# Patient Record
Sex: FEMALE | Race: WHITE | Employment: UNEMPLOYED | URBAN - METROPOLITAN AREA
[De-identification: names, ages, dates, MRNs, and addresses within clinical notes are randomized per-mention and may not be internally consistent; named-entity substitution may affect disease eponyms.]

---

## 2018-03-29 ENCOUNTER — HOSPITAL ENCOUNTER (OUTPATIENT)
Age: 33
Discharge: HOME | End: 2018-03-31
Payer: COMMERCIAL

## 2018-03-29 PROCEDURE — 87081 CULTURE SCREEN ONLY: CPT

## 2018-04-01 LAB — GROUP B STREP CULTURE: NORMAL

## 2018-07-03 ENCOUNTER — HOSPITAL ENCOUNTER (EMERGENCY)
Age: 33
Discharge: HOME | End: 2018-07-03
Payer: COMMERCIAL

## 2018-07-03 VITALS
OXYGEN SATURATION: 97 % | WEIGHT: 184 LBS | BODY MASS INDEX: 27.17 KG/M2 | TEMPERATURE: 98.3 F | RESPIRATION RATE: 14 BRPM | SYSTOLIC BLOOD PRESSURE: 117 MMHG | DIASTOLIC BLOOD PRESSURE: 77 MMHG | HEART RATE: 87 BPM

## 2018-07-03 DIAGNOSIS — B37.9 YEAST INFECTION: ICD-10-CM

## 2018-07-03 DIAGNOSIS — Z34.90 PREGNANCY, UNSPECIFIED GESTATIONAL AGE: Primary | ICD-10-CM

## 2018-07-03 LAB
BACTERIA: ABNORMAL /HPF
BILIRUBIN URINE: NEGATIVE
BLOOD, URINE: ABNORMAL
CLARITY: CLEAR
COLOR: YELLOW
EPITHELIAL CELLS, UA: ABNORMAL /HPF
GLUCOSE URINE: NEGATIVE MG/DL
HCG(URINE) PREGNANCY TEST: POSITIVE
KETONES, URINE: NEGATIVE MG/DL
LEUKOCYTE ESTERASE, URINE: ABNORMAL
NITRITE, URINE: NEGATIVE
PH UA: 5.5 (ref 5–9)
PROTEIN UA: NEGATIVE MG/DL
RBC UA: ABNORMAL /HPF (ref 0–2)
SPECIFIC GRAVITY UA: >=1.03 (ref 1–1.03)
UROBILINOGEN, URINE: 1 E.U./DL
WBC UA: ABNORMAL /HPF (ref 0–5)

## 2018-07-03 PROCEDURE — 81001 URINALYSIS AUTO W/SCOPE: CPT

## 2018-07-03 PROCEDURE — 81025 URINE PREGNANCY TEST: CPT

## 2018-07-03 PROCEDURE — 99212 OFFICE O/P EST SF 10 MIN: CPT

## 2018-07-03 ASSESSMENT — PAIN DESCRIPTION - PAIN TYPE: TYPE: ACUTE PAIN

## 2018-07-03 ASSESSMENT — PAIN DESCRIPTION - DESCRIPTORS: DESCRIPTORS: BURNING

## 2018-07-03 ASSESSMENT — PAIN DESCRIPTION - LOCATION: LOCATION: PERINEUM

## 2018-07-03 ASSESSMENT — PAIN SCALES - GENERAL: PAINLEVEL_OUTOF10: 10

## 2018-07-03 NOTE — ED PROVIDER NOTES
[facility]     Department of Emergency Medicine   ED  Provider Note  Admit Date/RoomTime: 7/3/2018  4:58 PM  ED Room:    Chief Complaint:   Urinary Tract Infection (she has burning with urination since yesterday )    History of Present Illness   Source of history provided by:  patient and spouse/SO. History/Exam Limitations: none. Noa Klein is a 34 y.o. old female who has a past medical history of:   Past Medical History:   Diagnosis Date    Knee effusion     presents to the emergency department by private vehicle, for dysuria, External vaginal irritation, which occured 1 day(s) prior to arrival.  Since onset the symptoms have been persistent and mild-moderate in severity. Symptoms are associated with none. She has a history of no complicating symptoms. Patient states that she gave birth to a child 3 months ago and did not know that she was pregnant. She denies vaginal bleeding, abdominal pain, abdominal cramping, vaginal discharge, vaginal lesions, nausea, vomiting, diarrhea, constipation. GYN History: irregular periods. STD History: no history of PID, STD's. Patient's last menstrual period was 2018. Current Pregnancy: Yes. Birth Control: None. Gravid Status:     ROS   Pertinent positives and negatives are stated within HPI, all other systems reviewed and are negative. History reviewed. No pertinent surgical history. Social History:  reports that she has been smoking Cigarettes. She has a 7.50 pack-year smoking history. She uses smokeless tobacco. She reports that she does not drink alcohol or use drugs. Family History: family history is not on file.    Allergies: Penicillins and Vicodin [hydrocodone-acetaminophen]    Physical Exam           ED Triage Vitals [18 1659]   BP Temp Temp Source Pulse Resp SpO2 Height Weight   117/77 98.3 °F (36.8 °C) (S) Oral 87 14 97 % -- 184 lb (83.5 kg)      Oxygen Saturation Interpretation: Normal.    · Constitutional:  Alert,

## 2018-07-03 NOTE — LETTER
[facility]  The [address] [place] [id number]  Phone: [phone]               July 3, 2018    Patient: Noa Klein   YOB: 1985   Date of Visit: 7/3/2018       To Whom It May Concern:    Juan Diego Briseno was seen and treated in our emergency department on 7/3/2018. She may return to work on 7/4/2018.       Sincerely,       Willie Gold LPN         Signature:__________________________________

## 2018-11-26 ENCOUNTER — HOSPITAL ENCOUNTER (EMERGENCY)
Age: 33
Discharge: HOME | End: 2018-11-26
Payer: COMMERCIAL

## 2018-11-26 VITALS
DIASTOLIC BLOOD PRESSURE: 63 MMHG | RESPIRATION RATE: 20 BRPM | SYSTOLIC BLOOD PRESSURE: 116 MMHG | TEMPERATURE: 98.3 F | BODY MASS INDEX: 27.17 KG/M2 | WEIGHT: 184 LBS | OXYGEN SATURATION: 99 % | HEART RATE: 89 BPM

## 2018-11-26 DIAGNOSIS — J06.9 VIRAL UPPER RESPIRATORY TRACT INFECTION: Primary | ICD-10-CM

## 2018-11-26 PROCEDURE — 99212 OFFICE O/P EST SF 10 MIN: CPT

## 2018-11-26 RX ORDER — BENZONATATE 100 MG/1
100 CAPSULE ORAL 3 TIMES DAILY PRN
Qty: 21 CAPSULE | Refills: 0 | Status: SHIPPED | OUTPATIENT
Start: 2018-11-26 | End: 2018-12-03

## 2018-11-26 RX ORDER — ALBUTEROL SULFATE 90 UG/1
2 AEROSOL, METERED RESPIRATORY (INHALATION) EVERY 4 HOURS PRN
Qty: 1 INHALER | Refills: 1 | Status: SHIPPED | OUTPATIENT
Start: 2018-11-26 | End: 2019-05-16

## 2018-11-26 RX ORDER — PREDNISONE 10 MG/1
40 TABLET ORAL DAILY
Qty: 20 TABLET | Refills: 0 | Status: SHIPPED | OUTPATIENT
Start: 2018-11-26 | End: 2018-12-01

## 2018-11-26 RX ORDER — AZITHROMYCIN 250 MG/1
TABLET, FILM COATED ORAL
Qty: 6 TABLET | Refills: 0 | Status: SHIPPED | OUTPATIENT
Start: 2018-11-26 | End: 2018-12-06

## 2019-05-09 ENCOUNTER — HOSPITAL ENCOUNTER (EMERGENCY)
Age: 34
Discharge: HOME | End: 2019-05-09
Payer: COMMERCIAL

## 2019-05-09 VITALS
DIASTOLIC BLOOD PRESSURE: 62 MMHG | RESPIRATION RATE: 16 BRPM | TEMPERATURE: 98.3 F | OXYGEN SATURATION: 99 % | HEART RATE: 95 BPM | SYSTOLIC BLOOD PRESSURE: 105 MMHG | WEIGHT: 216 LBS | BODY MASS INDEX: 31.9 KG/M2

## 2019-05-09 DIAGNOSIS — J06.9 VIRAL UPPER RESPIRATORY TRACT INFECTION: Primary | ICD-10-CM

## 2019-05-09 LAB — STREP GRP A PCR: NEGATIVE

## 2019-05-09 PROCEDURE — 99212 OFFICE O/P EST SF 10 MIN: CPT

## 2019-05-09 PROCEDURE — 87880 STREP A ASSAY W/OPTIC: CPT

## 2019-05-09 RX ORDER — FLUTICASONE PROPIONATE 50 MCG
2 SPRAY, SUSPENSION (ML) NASAL DAILY
Qty: 1 BOTTLE | Refills: 0 | Status: SHIPPED | OUTPATIENT
Start: 2019-05-09 | End: 2019-05-16

## 2019-05-09 NOTE — ED PROVIDER NOTES
Department of Emergency Medicine   [address] [place] [address]  Provider Note  Admit Date/RoomTime: 2019  5:10 PM  Room:   Chief Complaint   Pharyngitis (started last night, PREGNANT, 2 months) and Otalgia (right ear ache)    History of Present Illness   Source of history provided by:  Patient. History/Exam Limitations: None. Kandis Perez is a 30 y.o. female with no significant medical history. She is  at 2 months gestation. Reports the onset yesterday evening of sore throat, congestion, and right ear pain which increased in intensity today. Denies any chest pain or shortness of breath. Denies any fevers or chills. Denies any hemoptysis. Denies any abdominal pain, vomiting, or diarrhea. Does smoke. She denies any abdominal/pelvic pain, vaginal bleeding, leakage of fluid, or urinary symptoms. ROS    Pertinent positives and negatives are stated within HPI, all other systems reviewed and are negative. History reviewed. No pertinent surgical history. Social History:  reports that she has been smoking cigarettes. She has a 7.50 pack-year smoking history. She uses smokeless tobacco. She reports that she does not drink alcohol or use drugs. Family History: family history is not on file. Allergies: Penicillins and Vicodin [hydrocodone-acetaminophen]    Physical Exam            ED Triage Vitals [19 1712]   BP Temp Temp Source Pulse Resp SpO2 Height Weight   105/62 98.3 °F (36.8 °C) Oral 95 16 99 % -- 216 lb (98 kg)      Oxygen Saturation Interpretation: Normal.    Gen.: Vitals noted no distress. Afebrile. Normal phonation, no stridor, no trismus. ENT: Left TM is unremarkable. Right TM shows a slight serous effusion but no obvious acute otitis media. EACs unremarkable. Mastoids nontender. Posterior oropharynx remarkable for erythema but without exudate or swelling. Uvula is in the midline and nonedematous. No peritonsillar abscess. No Laith's Angina. Neck: Supple.  No discussed. All questions answered. Explained symptomatic treatment. Instruction provided in the use of fluids, vaporizer, acetaminophen, and other OTC medication for symptom control. Extra fluids  Analgesics as needed, dose reviewed. Follow up as needed should symptoms fail to improve. Counseling: Homegoing. I discussed the differential, results and discharge plan with the patient and/or family/friend/caregiver if present. I emphasized the importance of follow-up with the physician I referred them to in the timeframe recommended. I explained reasons for the patient to return to the Emergency Department. Additional verbal discharge instructions were also given and discussed with the patient to supplement those generated by the EMR. We also discussed medications that were prescribed (if any) including common side effects and interactions. The patient was advised to abstain from driving, operating heavy machinery or making significant decisions while taking medications such as opiates and muscle relaxers that may impair this. All questions were addressed. They understand return precautions and discharge instructions. The patient and/or family/friend/caregiver expressed understanding. Assessment      1. Viral upper respiratory tract infection      Plan   Discharge to home and advised to contact   Follow-up with your Ob/Gyn    As needed   Patient condition is good    New Medications     New Prescriptions    FLUTICASONE (FLONASE) 50 MCG/ACT NASAL SPRAY    2 sprays by Nasal route daily     Electronically signed by MEENU Gama   DD: 5/9/19  **This report was transcribed using voice recognition software. Every effort was made to ensure accuracy; however, inadvertent computerized transcription errors may be present.   END OF ED PROVIDER NOTE        Jeff Singh [address] [place], [place]  05/09/19 [address] [place] [address]  05/09/19 2414

## 2019-05-16 ENCOUNTER — HOSPITAL ENCOUNTER (EMERGENCY)
Age: 34
Discharge: HOME | End: 2019-05-16
Payer: COMMERCIAL

## 2019-05-16 VITALS
HEIGHT: 68 IN | OXYGEN SATURATION: 98 % | SYSTOLIC BLOOD PRESSURE: 112 MMHG | DIASTOLIC BLOOD PRESSURE: 70 MMHG | TEMPERATURE: 97.3 F | WEIGHT: 216 LBS | HEART RATE: 98 BPM | BODY MASS INDEX: 32.74 KG/M2 | RESPIRATION RATE: 18 BRPM

## 2019-05-16 DIAGNOSIS — J06.9 ACUTE UPPER RESPIRATORY INFECTION: Primary | ICD-10-CM

## 2019-05-16 PROCEDURE — 99282 EMERGENCY DEPT VISIT SF MDM: CPT

## 2019-05-16 RX ORDER — AZITHROMYCIN 250 MG/1
250 TABLET, FILM COATED ORAL SEE ADMIN INSTRUCTIONS
Qty: 6 TABLET | Refills: 0 | Status: SHIPPED | OUTPATIENT
Start: 2019-05-16 | End: 2019-05-21

## 2019-05-16 SDOH — HEALTH STABILITY: MENTAL HEALTH: HOW OFTEN DO YOU HAVE A DRINK CONTAINING ALCOHOL?: NEVER

## 2019-05-16 ASSESSMENT — PAIN SCALES - GENERAL
PAINLEVEL_OUTOF10: 8
PAINLEVEL_OUTOF10: 8

## 2019-05-16 ASSESSMENT — PAIN - FUNCTIONAL ASSESSMENT: PAIN_FUNCTIONAL_ASSESSMENT: 0-10

## 2019-05-16 ASSESSMENT — PAIN DESCRIPTION - PAIN TYPE: TYPE: ACUTE PAIN

## 2019-05-16 ASSESSMENT — PAIN DESCRIPTION - LOCATION: LOCATION: BACK

## 2019-05-16 ASSESSMENT — PAIN DESCRIPTION - ORIENTATION: ORIENTATION: MID

## 2019-05-16 NOTE — ED PROVIDER NOTES
HPI:  5/16/19, Time: 3:36 PM         Tyler Sanchez is a 30 y.o. female presenting to the ED for further evaluation of productive cough for the last week. The patient is a smoker, has history of asthma, reports over last week to 2 weeks she's had a cough that has been sometimes productive. The patient denies any fevers or chills, isn't every day smoker. Patient's proximally 11 weeks pregnant, she does take prenatal vitamins, does follow up with her OB. The patient reports otherwise she is in her normal state of health. Review of Systems:   Pertinent positives and negatives are stated within HPI, all other systems reviewed and are negative.          --------------------------------------------- PAST HISTORY ---------------------------------------------  Past Medical History:  has a past medical history of Knee effusion. Past Surgical History:  has no past surgical history on file. Social History:  reports that she has been smoking cigarettes. She has a 7.50 pack-year smoking history. She uses smokeless tobacco. She reports that she drank alcohol. She reports that she does not use drugs. Family History: family history is not on file. The patients home medications have been reviewed. Allergies: Penicillins and Vicodin [hydrocodone-acetaminophen]    -------------------------------------------------- RESULTS -------------------------------------------------  All laboratory and radiology results have been personally reviewed by myself   LABS:  No results found for this visit on 05/16/19. RADIOLOGY:  Interpreted by Radiologist.  No orders to display       ------------------------- NURSING NOTES AND VITALS REVIEWED ---------------------------   The nursing notes within the ED encounter and vital signs as below have been reviewed.    /70   Pulse 98   Temp 97.3 °F (36.3 °C)   Resp 18   Ht 5' 8\" (1.727 m)   Wt 216 lb (98 kg)   LMP 02/10/2019   SpO2 98%   BMI 32.84 kg/m²   Oxygen Saturation Interpretation: Normal      ---------------------------------------------------PHYSICAL EXAM--------------------------------------      Constitutional/General: Alert and oriented x3, well appearing, non toxic in NAD  Head: Normocephalic and atraumatic  Eyes: PERRL, EOMI  Mouth: Oropharynx clear, handling secretions, no trismus, oropharynx is nonerythematous. No exudates. Neck: Supple, full ROM,   Pulmonary: Lungs clear to auscultation bilaterally, no wheezes, rales, or rhonchi. Not in respiratory distress  Cardiovascular:  Regular rate and rhythm, no murmurs, gallops, or rubs. 2+ distal pulses  Extremities: Moves all extremities x 4. Warm and well perfused  Skin: warm and dry without rash  Neurologic: GCS 15,  Psych: Normal Affect      ------------------------------ ED COURSE/MEDICAL DECISION MAKING----------------------  Medications - No data to display      ED COURSE:       Medical Decision Making:    The patient's lung sounds are productive cough for the last 1-2 weeks for this patient is a smoker, she is 11 weeks pregnant, has a history of asthma. No audible wheezes on exam, the patient is Z-Willy and have her follow-up with a primary care physician. A referral was given her discharge paperwork for help establish with primary care. The patient points to return, the patient is nontoxic appearing, she is stable, vital signs are stable. Counseling: The emergency provider has spoken with the patient and discussed todays results, in addition to providing specific details for the plan of care and counseling regarding the diagnosis and prognosis. Questions are answered at this time and they are agreeable with the plan.      --------------------------------- IMPRESSION AND DISPOSITION ---------------------------------    IMPRESSION  1.  Acute upper respiratory infection Stable       DISPOSITION  Disposition: Discharge to home  Patient condition is good      NOTE: This report was transcribed using voice recognition software.  Every effort was made to ensure accuracy; however, inadvertent computerized transcription errors may be present      Renard Riley DO  05/16/19 1600

## 2019-05-16 NOTE — LETTER
[facility] [address] [place] [address] Emergency Department  [address] [facility] [address]  Phone: [phone]               May 16, 2019    Patient: Benna Carrel   YOB: 1985   Date of Visit: 5/16/2019       To Whom It May Concern:    Abdirahman Lowe was seen and treated in our emergency department on 5/16/2019. She may return to work on 05/17/2019.       Sincerely,       Atul Kidd DO         Signature:__________________________________

## 2019-06-05 ENCOUNTER — APPOINTMENT (OUTPATIENT)
Dept: ULTRASOUND IMAGING | Age: 34
End: 2019-06-05
Payer: COMMERCIAL

## 2019-06-05 ENCOUNTER — HOSPITAL ENCOUNTER (EMERGENCY)
Age: 34
Discharge: HOME | End: 2019-06-05
Payer: COMMERCIAL

## 2019-06-05 VITALS
SYSTOLIC BLOOD PRESSURE: 113 MMHG | DIASTOLIC BLOOD PRESSURE: 62 MMHG | HEART RATE: 90 BPM | RESPIRATION RATE: 16 BRPM | HEIGHT: 69 IN | OXYGEN SATURATION: 98 % | TEMPERATURE: 98.4 F | BODY MASS INDEX: 31.84 KG/M2 | WEIGHT: 215 LBS

## 2019-06-05 DIAGNOSIS — S09.90XA CLOSED HEAD INJURY, INITIAL ENCOUNTER: ICD-10-CM

## 2019-06-05 DIAGNOSIS — O46.8X1 SUBCHORIONIC HEMATOMA IN FIRST TRIMESTER, SINGLE OR UNSPECIFIED FETUS: ICD-10-CM

## 2019-06-05 DIAGNOSIS — R10.13 EPIGASTRIC PAIN: ICD-10-CM

## 2019-06-05 DIAGNOSIS — O41.8X10 SUBCHORIONIC HEMATOMA IN FIRST TRIMESTER, SINGLE OR UNSPECIFIED FETUS: ICD-10-CM

## 2019-06-05 DIAGNOSIS — N30.00 ACUTE CYSTITIS WITHOUT HEMATURIA: Primary | ICD-10-CM

## 2019-06-05 DIAGNOSIS — S16.1XXA STRAIN OF NECK MUSCLE, INITIAL ENCOUNTER: ICD-10-CM

## 2019-06-05 LAB
ANION GAP SERPL CALCULATED.3IONS-SCNC: 12 MMOL/L (ref 7–16)
BACTERIA: ABNORMAL /HPF
BASOPHILS ABSOLUTE: 0.03 E9/L (ref 0–0.2)
BASOPHILS RELATIVE PERCENT: 0.3 % (ref 0–2)
BILIRUBIN URINE: NEGATIVE
BLOOD, URINE: NEGATIVE
BUN BLDV-MCNC: 8 MG/DL (ref 6–20)
CALCIUM SERPL-MCNC: 9.4 MG/DL (ref 8.6–10.2)
CHLORIDE BLD-SCNC: 103 MMOL/L (ref 98–107)
CLARITY: ABNORMAL
CO2: 22 MMOL/L (ref 22–29)
COLOR: YELLOW
CREAT SERPL-MCNC: 0.6 MG/DL (ref 0.5–1)
CRYSTALS, UA: ABNORMAL
EOSINOPHILS ABSOLUTE: 0.2 E9/L (ref 0.05–0.5)
EOSINOPHILS RELATIVE PERCENT: 1.8 % (ref 0–6)
EPITHELIAL CELLS, UA: ABNORMAL /HPF
GFR AFRICAN AMERICAN: >60
GFR NON-AFRICAN AMERICAN: >60 ML/MIN/1.73
GLUCOSE BLD-MCNC: 112 MG/DL (ref 74–99)
GLUCOSE URINE: NEGATIVE MG/DL
HCT VFR BLD CALC: 37 % (ref 34–48)
HEMOGLOBIN: 12.2 G/DL (ref 11.5–15.5)
IMMATURE GRANULOCYTES #: 0.06 E9/L
IMMATURE GRANULOCYTES %: 0.5 % (ref 0–5)
KETONES, URINE: NEGATIVE MG/DL
LEUKOCYTE ESTERASE, URINE: ABNORMAL
LIPASE: 26 U/L (ref 13–60)
LYMPHOCYTES ABSOLUTE: 2.05 E9/L (ref 1.5–4)
LYMPHOCYTES RELATIVE PERCENT: 18.1 % (ref 20–42)
MCH RBC QN AUTO: 28.2 PG (ref 26–35)
MCHC RBC AUTO-ENTMCNC: 33 % (ref 32–34.5)
MCV RBC AUTO: 85.6 FL (ref 80–99.9)
MONOCYTES ABSOLUTE: 0.72 E9/L (ref 0.1–0.95)
MONOCYTES RELATIVE PERCENT: 6.3 % (ref 2–12)
MUCUS: PRESENT
NEUTROPHILS ABSOLUTE: 8.29 E9/L (ref 1.8–7.3)
NEUTROPHILS RELATIVE PERCENT: 73 % (ref 43–80)
NITRITE, URINE: NEGATIVE
PDW BLD-RTO: 12.6 FL (ref 11.5–15)
PH UA: 6 (ref 5–9)
PLATELET # BLD: 194 E9/L (ref 130–450)
PMV BLD AUTO: 11.3 FL (ref 7–12)
POTASSIUM SERPL-SCNC: 3.8 MMOL/L (ref 3.5–5)
PROTEIN UA: 100 MG/DL
RBC # BLD: 4.32 E12/L (ref 3.5–5.5)
RBC UA: ABNORMAL /HPF (ref 0–2)
SODIUM BLD-SCNC: 137 MMOL/L (ref 132–146)
SPECIFIC GRAVITY UA: >=1.03 (ref 1–1.03)
UROBILINOGEN, URINE: 1 E.U./DL
WBC # BLD: 11.4 E9/L (ref 4.5–11.5)
WBC UA: ABNORMAL /HPF (ref 0–5)

## 2019-06-05 PROCEDURE — 81001 URINALYSIS AUTO W/SCOPE: CPT

## 2019-06-05 PROCEDURE — 80048 BASIC METABOLIC PNL TOTAL CA: CPT

## 2019-06-05 PROCEDURE — 76801 OB US < 14 WKS SINGLE FETUS: CPT

## 2019-06-05 PROCEDURE — 36415 COLL VENOUS BLD VENIPUNCTURE: CPT

## 2019-06-05 PROCEDURE — 83690 ASSAY OF LIPASE: CPT

## 2019-06-05 PROCEDURE — 85025 COMPLETE CBC W/AUTO DIFF WBC: CPT

## 2019-06-05 PROCEDURE — 87088 URINE BACTERIA CULTURE: CPT

## 2019-06-05 PROCEDURE — 6370000000 HC RX 637 (ALT 250 FOR IP): Performed by: PHYSICIAN ASSISTANT

## 2019-06-05 PROCEDURE — 99284 EMERGENCY DEPT VISIT MOD MDM: CPT

## 2019-06-05 RX ORDER — ACETAMINOPHEN 500 MG
1000 TABLET ORAL ONCE
Status: COMPLETED | OUTPATIENT
Start: 2019-06-05 | End: 2019-06-05

## 2019-06-05 RX ORDER — NITROFURANTOIN 25; 75 MG/1; MG/1
100 CAPSULE ORAL 2 TIMES DAILY
Qty: 10 CAPSULE | Refills: 0 | Status: SHIPPED | OUTPATIENT
Start: 2019-06-05 | End: 2019-06-10

## 2019-06-05 RX ADMIN — ACETAMINOPHEN 1000 MG: 500 TABLET, FILM COATED ORAL at 17:34

## 2019-06-05 ASSESSMENT — PAIN DESCRIPTION - DESCRIPTORS: DESCRIPTORS: CONSTANT

## 2019-06-05 ASSESSMENT — PAIN SCALES - GENERAL
PAINLEVEL_OUTOF10: 10
PAINLEVEL_OUTOF10: 6

## 2019-06-05 ASSESSMENT — PAIN DESCRIPTION - ORIENTATION: ORIENTATION: UPPER

## 2019-06-05 ASSESSMENT — PAIN DESCRIPTION - FREQUENCY: FREQUENCY: CONTINUOUS

## 2019-06-05 ASSESSMENT — PAIN DESCRIPTION - PROGRESSION: CLINICAL_PROGRESSION: NOT CHANGED

## 2019-06-05 ASSESSMENT — PAIN DESCRIPTION - LOCATION: LOCATION: ABDOMEN;HEAD
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
## 2019-06-07 LAB — URINE CULTURE, ROUTINE: NORMAL

## 2019-07-05 ENCOUNTER — HOSPITAL ENCOUNTER (EMERGENCY)
Age: 34
Discharge: HOME | End: 2019-07-06
Payer: COMMERCIAL

## 2019-07-05 ENCOUNTER — APPOINTMENT (OUTPATIENT)
Dept: ULTRASOUND IMAGING | Age: 34
DRG: 544 | End: 2019-07-05
Payer: COMMERCIAL

## 2019-07-05 ENCOUNTER — HOSPITAL ENCOUNTER (OUTPATIENT)
Age: 34
Setting detail: OBSERVATION
Discharge: HOME | DRG: 544 | End: 2019-07-06
Attending: OBSTETRICS & GYNECOLOGY | Admitting: OBSTETRICS & GYNECOLOGY
Payer: COMMERCIAL

## 2019-07-05 PROBLEM — O26.91 COMPLICATED PREGNANCY, FIRST TRIMESTER: Status: ACTIVE | Noted: 2019-07-05

## 2019-07-05 PROCEDURE — 81001 URINALYSIS AUTO W/SCOPE: CPT

## 2019-07-05 PROCEDURE — 80307 DRUG TEST PRSMV CHEM ANLYZR: CPT

## 2019-07-05 PROCEDURE — 76801 OB US < 14 WKS SINGLE FETUS: CPT

## 2019-07-06 ENCOUNTER — ANESTHESIA (OUTPATIENT)
Dept: OPERATING ROOM | Age: 34
DRG: 544 | End: 2019-07-06
Payer: COMMERCIAL

## 2019-07-06 ENCOUNTER — ANESTHESIA EVENT (OUTPATIENT)
Dept: OPERATING ROOM | Age: 34
DRG: 544 | End: 2019-07-06
Payer: COMMERCIAL

## 2019-07-06 VITALS
RESPIRATION RATE: 1 BRPM | OXYGEN SATURATION: 99 % | SYSTOLIC BLOOD PRESSURE: 90 MMHG | TEMPERATURE: 98.1 F | DIASTOLIC BLOOD PRESSURE: 46 MMHG

## 2019-07-06 VITALS
TEMPERATURE: 97.7 F | OXYGEN SATURATION: 96 % | RESPIRATION RATE: 14 BRPM | HEART RATE: 83 BPM | DIASTOLIC BLOOD PRESSURE: 63 MMHG | SYSTOLIC BLOOD PRESSURE: 122 MMHG

## 2019-07-06 PROBLEM — O03.9 SPONTANEOUS MISCARRIAGE: Status: ACTIVE | Noted: 2019-07-06

## 2019-07-06 PROBLEM — O03.4 INCOMPLETE ABORTION: Status: ACTIVE | Noted: 2019-07-06

## 2019-07-06 LAB
ABO/RH: NORMAL
AMPHETAMINE SCREEN, URINE: NOT DETECTED
ANTIBODY SCREEN: NORMAL
BACTERIA: ABNORMAL /HPF
BARBITURATE SCREEN URINE: NOT DETECTED
BASOPHILS ABSOLUTE: 0.05 E9/L (ref 0–0.2)
BASOPHILS RELATIVE PERCENT: 0.5 % (ref 0–2)
BENZODIAZEPINE SCREEN, URINE: NOT DETECTED
BILIRUBIN URINE: NEGATIVE
BLOOD, URINE: ABNORMAL
CANNABINOID SCREEN URINE: NOT DETECTED
CLARITY: CLEAR
COCAINE METABOLITE SCREEN URINE: NOT DETECTED
COLOR: YELLOW
CRYSTALS, UA: ABNORMAL
EOSINOPHILS ABSOLUTE: 0.29 E9/L (ref 0.05–0.5)
EOSINOPHILS RELATIVE PERCENT: 2.7 % (ref 0–6)
EPITHELIAL CELLS, UA: ABNORMAL /HPF
GLUCOSE URINE: NEGATIVE MG/DL
HCT VFR BLD CALC: 35.1 % (ref 34–48)
HEMOGLOBIN: 11.8 G/DL (ref 11.5–15.5)
IMMATURE GRANULOCYTES #: 0.05 E9/L
IMMATURE GRANULOCYTES %: 0.5 % (ref 0–5)
KETONES, URINE: NEGATIVE MG/DL
LEUKOCYTE ESTERASE, URINE: NEGATIVE
LYMPHOCYTES ABSOLUTE: 2.37 E9/L (ref 1.5–4)
LYMPHOCYTES RELATIVE PERCENT: 21.7 % (ref 20–42)
MCH RBC QN AUTO: 28.9 PG (ref 26–35)
MCHC RBC AUTO-ENTMCNC: 33.6 % (ref 32–34.5)
MCV RBC AUTO: 85.8 FL (ref 80–99.9)
METHADONE SCREEN, URINE: NOT DETECTED
MONOCYTES ABSOLUTE: 0.69 E9/L (ref 0.1–0.95)
MONOCYTES RELATIVE PERCENT: 6.3 % (ref 2–12)
NEUTROPHILS ABSOLUTE: 7.45 E9/L (ref 1.8–7.3)
NEUTROPHILS RELATIVE PERCENT: 68.3 % (ref 43–80)
NITRITE, URINE: NEGATIVE
OPIATE SCREEN URINE: NOT DETECTED
PDW BLD-RTO: 12.7 FL (ref 11.5–15)
PH UA: 5.5 (ref 5–9)
PHENCYCLIDINE SCREEN URINE: NOT DETECTED
PLATELET # BLD: 180 E9/L (ref 130–450)
PMV BLD AUTO: 11.7 FL (ref 7–12)
PROPOXYPHENE SCREEN: NOT DETECTED
PROTEIN UA: NEGATIVE MG/DL
RBC # BLD: 4.09 E12/L (ref 3.5–5.5)
RBC UA: ABNORMAL /HPF (ref 0–2)
SPECIFIC GRAVITY UA: >=1.03 (ref 1–1.03)
TRICHOMONAS: ABNORMAL /HPF
UROBILINOGEN, URINE: 0.2 E.U./DL
WBC # BLD: 10.9 E9/L (ref 4.5–11.5)
WBC UA: ABNORMAL /HPF (ref 0–5)

## 2019-07-06 PROCEDURE — 7100000001 HC PACU RECOVERY - ADDTL 15 MIN: Performed by: OBSTETRICS & GYNECOLOGY

## 2019-07-06 PROCEDURE — 36415 COLL VENOUS BLD VENIPUNCTURE: CPT

## 2019-07-06 PROCEDURE — 3600000012 HC SURGERY LEVEL 2 ADDTL 15MIN: Performed by: OBSTETRICS & GYNECOLOGY

## 2019-07-06 PROCEDURE — 86901 BLOOD TYPING SEROLOGIC RH(D): CPT

## 2019-07-06 PROCEDURE — 86900 BLOOD TYPING SEROLOGIC ABO: CPT

## 2019-07-06 PROCEDURE — 96360 HYDRATION IV INFUSION INIT: CPT

## 2019-07-06 PROCEDURE — 99201 HC NEW PT, OUTPT VISIT LEVEL 1: CPT

## 2019-07-06 PROCEDURE — 2709999900 HC NON-CHARGEABLE SUPPLY: Performed by: OBSTETRICS & GYNECOLOGY

## 2019-07-06 PROCEDURE — 10D17ZZ EXTRACTION OF PRODUCTS OF CONCEPTION, RETAINED, VIA NATURAL OR ARTIFICIAL OPENING: ICD-10-PCS | Performed by: OBSTETRICS & GYNECOLOGY

## 2019-07-06 PROCEDURE — 3600000002 HC SURGERY LEVEL 2 BASE: Performed by: OBSTETRICS & GYNECOLOGY

## 2019-07-06 PROCEDURE — 86850 RBC ANTIBODY SCREEN: CPT

## 2019-07-06 PROCEDURE — G0378 HOSPITAL OBSERVATION PER HR: HCPCS

## 2019-07-06 PROCEDURE — 85025 COMPLETE CBC W/AUTO DIFF WBC: CPT

## 2019-07-06 PROCEDURE — 3700000001 HC ADD 15 MINUTES (ANESTHESIA): Performed by: OBSTETRICS & GYNECOLOGY

## 2019-07-06 PROCEDURE — 3700000000 HC ANESTHESIA ATTENDED CARE: Performed by: OBSTETRICS & GYNECOLOGY

## 2019-07-06 PROCEDURE — 88305 TISSUE EXAM BY PATHOLOGIST: CPT

## 2019-07-06 PROCEDURE — 6360000002 HC RX W HCPCS: Performed by: OBSTETRICS & GYNECOLOGY

## 2019-07-06 PROCEDURE — 7100000000 HC PACU RECOVERY - FIRST 15 MIN: Performed by: OBSTETRICS & GYNECOLOGY

## 2019-07-06 PROCEDURE — 6370000000 HC RX 637 (ALT 250 FOR IP): Performed by: OBSTETRICS & GYNECOLOGY

## 2019-07-06 PROCEDURE — 2580000003 HC RX 258: Performed by: NURSE ANESTHETIST, CERTIFIED REGISTERED

## 2019-07-06 PROCEDURE — 6360000002 HC RX W HCPCS

## 2019-07-06 PROCEDURE — 6360000002 HC RX W HCPCS: Performed by: NURSE ANESTHETIST, CERTIFIED REGISTERED

## 2019-07-06 PROCEDURE — 96361 HYDRATE IV INFUSION ADD-ON: CPT

## 2019-07-06 RX ORDER — ONDANSETRON 2 MG/ML
4 INJECTION INTRAMUSCULAR; INTRAVENOUS EVERY 6 HOURS PRN
Status: DISCONTINUED | OUTPATIENT
Start: 2019-07-06 | End: 2019-07-06 | Stop reason: HOSPADM

## 2019-07-06 RX ORDER — SODIUM CHLORIDE 0.9 % (FLUSH) 0.9 %
10 SYRINGE (ML) INJECTION PRN
Status: DISCONTINUED | OUTPATIENT
Start: 2019-07-06 | End: 2019-07-06 | Stop reason: HOSPADM

## 2019-07-06 RX ORDER — DOCUSATE SODIUM 100 MG/1
100 CAPSULE, LIQUID FILLED ORAL 2 TIMES DAILY
Status: DISCONTINUED | OUTPATIENT
Start: 2019-07-06 | End: 2019-07-06 | Stop reason: HOSPADM

## 2019-07-06 RX ORDER — FENTANYL CITRATE 50 UG/ML
25 INJECTION, SOLUTION INTRAMUSCULAR; INTRAVENOUS EVERY 5 MIN PRN
Status: DISCONTINUED | OUTPATIENT
Start: 2019-07-06 | End: 2019-07-06 | Stop reason: HOSPADM

## 2019-07-06 RX ORDER — MEPERIDINE HYDROCHLORIDE 25 MG/ML
25 INJECTION INTRAMUSCULAR; INTRAVENOUS; SUBCUTANEOUS EVERY 5 MIN PRN
Status: COMPLETED | OUTPATIENT
Start: 2019-07-06 | End: 2019-07-06

## 2019-07-06 RX ORDER — KETOROLAC TROMETHAMINE 30 MG/ML
INJECTION, SOLUTION INTRAMUSCULAR; INTRAVENOUS PRN
Status: DISCONTINUED | OUTPATIENT
Start: 2019-07-06 | End: 2019-07-06 | Stop reason: SDUPTHER

## 2019-07-06 RX ORDER — FENTANYL CITRATE 50 UG/ML
INJECTION, SOLUTION INTRAMUSCULAR; INTRAVENOUS PRN
Status: DISCONTINUED | OUTPATIENT
Start: 2019-07-06 | End: 2019-07-06 | Stop reason: SDUPTHER

## 2019-07-06 RX ORDER — MEPERIDINE HYDROCHLORIDE 25 MG/ML
INJECTION INTRAMUSCULAR; INTRAVENOUS; SUBCUTANEOUS
Status: COMPLETED
Start: 2019-07-06 | End: 2019-07-06

## 2019-07-06 RX ORDER — SODIUM CHLORIDE 0.9 % (FLUSH) 0.9 %
10 SYRINGE (ML) INJECTION EVERY 12 HOURS SCHEDULED
Status: DISCONTINUED | OUTPATIENT
Start: 2019-07-06 | End: 2019-07-06 | Stop reason: HOSPADM

## 2019-07-06 RX ORDER — MIDAZOLAM HYDROCHLORIDE 1 MG/ML
INJECTION INTRAMUSCULAR; INTRAVENOUS PRN
Status: DISCONTINUED | OUTPATIENT
Start: 2019-07-06 | End: 2019-07-06 | Stop reason: SDUPTHER

## 2019-07-06 RX ORDER — SODIUM CHLORIDE, SODIUM LACTATE, POTASSIUM CHLORIDE, CALCIUM CHLORIDE 600; 310; 30; 20 MG/100ML; MG/100ML; MG/100ML; MG/100ML
INJECTION, SOLUTION INTRAVENOUS CONTINUOUS
Status: DISCONTINUED | OUTPATIENT
Start: 2019-07-06 | End: 2019-07-06 | Stop reason: HOSPADM

## 2019-07-06 RX ORDER — ONDANSETRON 2 MG/ML
4 INJECTION INTRAMUSCULAR; INTRAVENOUS
Status: DISCONTINUED | OUTPATIENT
Start: 2019-07-06 | End: 2019-07-06 | Stop reason: HOSPADM

## 2019-07-06 RX ORDER — ONDANSETRON 2 MG/ML
INJECTION INTRAMUSCULAR; INTRAVENOUS PRN
Status: DISCONTINUED | OUTPATIENT
Start: 2019-07-06 | End: 2019-07-06 | Stop reason: SDUPTHER

## 2019-07-06 RX ORDER — LIDOCAINE HYDROCHLORIDE 20 MG/ML
INJECTION, SOLUTION INTRAVENOUS PRN
Status: DISCONTINUED | OUTPATIENT
Start: 2019-07-06 | End: 2019-07-06 | Stop reason: SDUPTHER

## 2019-07-06 RX ORDER — SODIUM CHLORIDE, SODIUM LACTATE, POTASSIUM CHLORIDE, CALCIUM CHLORIDE 600; 310; 30; 20 MG/100ML; MG/100ML; MG/100ML; MG/100ML
INJECTION, SOLUTION INTRAVENOUS CONTINUOUS PRN
Status: DISCONTINUED | OUTPATIENT
Start: 2019-07-06 | End: 2019-07-06 | Stop reason: SDUPTHER

## 2019-07-06 RX ORDER — FENTANYL CITRATE 50 UG/ML
50 INJECTION, SOLUTION INTRAMUSCULAR; INTRAVENOUS EVERY 5 MIN PRN
Status: DISCONTINUED | OUTPATIENT
Start: 2019-07-06 | End: 2019-07-06 | Stop reason: HOSPADM

## 2019-07-06 RX ORDER — PROPOFOL 10 MG/ML
INJECTION, EMULSION INTRAVENOUS PRN
Status: DISCONTINUED | OUTPATIENT
Start: 2019-07-06 | End: 2019-07-06 | Stop reason: SDUPTHER

## 2019-07-06 RX ORDER — DEXAMETHASONE SODIUM PHOSPHATE 10 MG/ML
INJECTION, SOLUTION INTRAMUSCULAR; INTRAVENOUS PRN
Status: DISCONTINUED | OUTPATIENT
Start: 2019-07-06 | End: 2019-07-06 | Stop reason: SDUPTHER

## 2019-07-06 RX ORDER — KETOROLAC TROMETHAMINE 30 MG/ML
30 INJECTION, SOLUTION INTRAMUSCULAR; INTRAVENOUS EVERY 6 HOURS
Status: DISCONTINUED | OUTPATIENT
Start: 2019-07-06 | End: 2019-07-06 | Stop reason: HOSPADM

## 2019-07-06 RX ORDER — IBUPROFEN 600 MG/1
600 TABLET ORAL EVERY 6 HOURS PRN
Status: DISCONTINUED | OUTPATIENT
Start: 2019-07-08 | End: 2019-07-06 | Stop reason: HOSPADM

## 2019-07-06 RX ADMIN — DEXAMETHASONE SODIUM PHOSPHATE 10 MG: 10 INJECTION, SOLUTION INTRAMUSCULAR; INTRAVENOUS at 10:15

## 2019-07-06 RX ADMIN — ONDANSETRON HYDROCHLORIDE 4 MG: 2 INJECTION, SOLUTION INTRAMUSCULAR; INTRAVENOUS at 10:15

## 2019-07-06 RX ADMIN — MEPERIDINE HYDROCHLORIDE 25 MG: 25 INJECTION INTRAMUSCULAR; INTRAVENOUS; SUBCUTANEOUS at 11:06

## 2019-07-06 RX ADMIN — Medication 250 ML/HR: at 10:24

## 2019-07-06 RX ADMIN — MIDAZOLAM 2 MG: 1 INJECTION INTRAMUSCULAR; INTRAVENOUS at 10:02

## 2019-07-06 RX ADMIN — KETOROLAC TROMETHAMINE 30 MG: 30 INJECTION, SOLUTION INTRAMUSCULAR at 12:50

## 2019-07-06 RX ADMIN — PROPOFOL 200 MG: 10 INJECTION, EMULSION INTRAVENOUS at 10:07

## 2019-07-06 RX ADMIN — KETOROLAC TROMETHAMINE 30 MG: 30 INJECTION, SOLUTION INTRAMUSCULAR; INTRAVENOUS at 10:35

## 2019-07-06 RX ADMIN — LIDOCAINE HYDROCHLORIDE 80 MG: 20 INJECTION, SOLUTION INTRAVENOUS at 10:07

## 2019-07-06 RX ADMIN — SODIUM CHLORIDE, POTASSIUM CHLORIDE, SODIUM LACTATE AND CALCIUM CHLORIDE: 600; 310; 30; 20 INJECTION, SOLUTION INTRAVENOUS at 10:20

## 2019-07-06 RX ADMIN — MEPERIDINE HYDROCHLORIDE 25 MG: 25 INJECTION INTRAMUSCULAR; INTRAVENOUS; SUBCUTANEOUS at 10:50

## 2019-07-06 RX ADMIN — FENTANYL CITRATE 50 MCG: 50 INJECTION, SOLUTION INTRAMUSCULAR; INTRAVENOUS at 10:07

## 2019-07-06 RX ADMIN — SODIUM CHLORIDE, POTASSIUM CHLORIDE, SODIUM LACTATE AND CALCIUM CHLORIDE: 600; 310; 30; 20 INJECTION, SOLUTION INTRAVENOUS at 10:02

## 2019-07-06 RX ADMIN — DOCUSATE SODIUM 100 MG: 100 CAPSULE, LIQUID FILLED ORAL at 12:51

## 2019-07-06 ASSESSMENT — PULMONARY FUNCTION TESTS
PIF_VALUE: 16
PIF_VALUE: 0
PIF_VALUE: 16
PIF_VALUE: 3
PIF_VALUE: 23
PIF_VALUE: 19
PIF_VALUE: 0
PIF_VALUE: 16
PIF_VALUE: 15
PIF_VALUE: 16
PIF_VALUE: 14
PIF_VALUE: 1
PIF_VALUE: 17
PIF_VALUE: 16
PIF_VALUE: 32
PIF_VALUE: 16
PIF_VALUE: 16
PIF_VALUE: 17
PIF_VALUE: 13
PIF_VALUE: 16
PIF_VALUE: 1
PIF_VALUE: 0
PIF_VALUE: 16
PIF_VALUE: 0
PIF_VALUE: 26
PIF_VALUE: 16
PIF_VALUE: 16

## 2019-07-06 ASSESSMENT — PAIN SCALES - GENERAL
PAINLEVEL_OUTOF10: 1
PAINLEVEL_OUTOF10: 5
PAINLEVEL_OUTOF10: 0
PAINLEVEL_OUTOF10: 4
PAINLEVEL_OUTOF10: 1

## 2019-07-06 ASSESSMENT — LIFESTYLE VARIABLES: SMOKING_STATUS: 1

## 2019-07-06 ASSESSMENT — PAIN DESCRIPTION - LOCATION
LOCATION: ABDOMEN
LOCATION: ABDOMEN

## 2019-07-06 ASSESSMENT — PAIN DESCRIPTION - PAIN TYPE
TYPE: SURGICAL PAIN
TYPE: SURGICAL PAIN

## 2019-07-06 ASSESSMENT — PAIN DESCRIPTION - DESCRIPTORS
DESCRIPTORS: ACHING;CRAMPING;DISCOMFORT
DESCRIPTORS: ACHING;CRAMPING;DISCOMFORT

## 2019-07-06 ASSESSMENT — PAIN DESCRIPTION - ORIENTATION: ORIENTATION: LEFT

## 2019-07-06 NOTE — ANESTHESIA PRE PROCEDURE
intravenous. Anesthetic plan and risks discussed with patient. Plan discussed with CRNA.                   Candice Felix MD   7/6/2019

## 2019-11-11 ENCOUNTER — APPOINTMENT (OUTPATIENT)
Dept: ULTRASOUND IMAGING | Age: 34
End: 2019-11-11
Payer: COMMERCIAL

## 2019-11-11 ENCOUNTER — HOSPITAL ENCOUNTER (EMERGENCY)
Age: 34
Discharge: HOME | End: 2019-11-11
Attending: EMERGENCY MEDICINE
Payer: COMMERCIAL

## 2019-11-11 VITALS
TEMPERATURE: 98.5 F | HEIGHT: 68 IN | RESPIRATION RATE: 24 BRPM | SYSTOLIC BLOOD PRESSURE: 133 MMHG | BODY MASS INDEX: 30.31 KG/M2 | DIASTOLIC BLOOD PRESSURE: 69 MMHG | WEIGHT: 200 LBS | HEART RATE: 97 BPM | OXYGEN SATURATION: 98 %

## 2019-11-11 DIAGNOSIS — R11.0 NAUSEA: ICD-10-CM

## 2019-11-11 DIAGNOSIS — N39.0 URINARY TRACT INFECTION WITHOUT HEMATURIA, SITE UNSPECIFIED: Primary | ICD-10-CM

## 2019-11-11 DIAGNOSIS — O26.91 COMPLICATION OF PREGNANCY IN FIRST TRIMESTER: ICD-10-CM

## 2019-11-11 LAB
ALBUMIN SERPL-MCNC: 4.1 G/DL (ref 3.5–5.2)
ALP BLD-CCNC: 61 U/L (ref 35–104)
ALT SERPL-CCNC: 22 U/L (ref 0–32)
ANION GAP SERPL CALCULATED.3IONS-SCNC: 12 MMOL/L (ref 7–16)
AST SERPL-CCNC: 18 U/L (ref 0–31)
BACTERIA: ABNORMAL /HPF
BASOPHILS ABSOLUTE: 0.04 E9/L (ref 0–0.2)
BASOPHILS RELATIVE PERCENT: 0.4 % (ref 0–2)
BILIRUB SERPL-MCNC: 0.3 MG/DL (ref 0–1.2)
BILIRUBIN URINE: NEGATIVE
BLOOD, URINE: ABNORMAL
BUN BLDV-MCNC: 6 MG/DL (ref 6–20)
CALCIUM SERPL-MCNC: 9 MG/DL (ref 8.6–10.2)
CHLORIDE BLD-SCNC: 104 MMOL/L (ref 98–107)
CLARITY: ABNORMAL
CO2: 20 MMOL/L (ref 22–29)
COLOR: YELLOW
CREAT SERPL-MCNC: 0.7 MG/DL (ref 0.5–1)
EOSINOPHILS ABSOLUTE: 0.03 E9/L (ref 0.05–0.5)
EOSINOPHILS RELATIVE PERCENT: 0.3 % (ref 0–6)
EPITHELIAL CELLS, UA: ABNORMAL /HPF
GFR AFRICAN AMERICAN: >60
GFR NON-AFRICAN AMERICAN: >60 ML/MIN/1.73
GLUCOSE BLD-MCNC: 137 MG/DL (ref 74–99)
GLUCOSE URINE: NEGATIVE MG/DL
GONADOTROPIN, CHORIONIC (HCG) QUANT: ABNORMAL MIU/ML
HCG, URINE, POC: POSITIVE
HCT VFR BLD CALC: 35.7 % (ref 34–48)
HEMOGLOBIN: 11.8 G/DL (ref 11.5–15.5)
IMMATURE GRANULOCYTES #: 0.03 E9/L
IMMATURE GRANULOCYTES %: 0.3 % (ref 0–5)
KETONES, URINE: NEGATIVE MG/DL
LEUKOCYTE ESTERASE, URINE: ABNORMAL
LYMPHOCYTES ABSOLUTE: 0.86 E9/L (ref 1.5–4)
LYMPHOCYTES RELATIVE PERCENT: 8.8 % (ref 20–42)
Lab: NORMAL
MCH RBC QN AUTO: 27.9 PG (ref 26–35)
MCHC RBC AUTO-ENTMCNC: 33.1 % (ref 32–34.5)
MCV RBC AUTO: 84.4 FL (ref 80–99.9)
MONOCYTES ABSOLUTE: 0.58 E9/L (ref 0.1–0.95)
MONOCYTES RELATIVE PERCENT: 5.9 % (ref 2–12)
NEGATIVE QC PASS/FAIL: NORMAL
NEUTROPHILS ABSOLUTE: 8.22 E9/L (ref 1.8–7.3)
NEUTROPHILS RELATIVE PERCENT: 84.3 % (ref 43–80)
NITRITE, URINE: NEGATIVE
PDW BLD-RTO: 13.8 FL (ref 11.5–15)
PH UA: 7 (ref 5–9)
PLATELET # BLD: 168 E9/L (ref 130–450)
PMV BLD AUTO: 10.6 FL (ref 7–12)
POSITIVE QC PASS/FAIL: NORMAL
POTASSIUM SERPL-SCNC: 3.6 MMOL/L (ref 3.5–5)
PROTEIN UA: 30 MG/DL
RBC # BLD: 4.23 E12/L (ref 3.5–5.5)
RBC UA: ABNORMAL /HPF (ref 0–2)
SODIUM BLD-SCNC: 136 MMOL/L (ref 132–146)
SPECIFIC GRAVITY UA: 1.02 (ref 1–1.03)
TOTAL PROTEIN: 7.4 G/DL (ref 6.4–8.3)
UROBILINOGEN, URINE: 2 E.U./DL
WBC # BLD: 9.8 E9/L (ref 4.5–11.5)
WBC UA: >20 /HPF (ref 0–5)

## 2019-11-11 PROCEDURE — 84702 CHORIONIC GONADOTROPIN TEST: CPT

## 2019-11-11 PROCEDURE — 81001 URINALYSIS AUTO W/SCOPE: CPT

## 2019-11-11 PROCEDURE — 2580000003 HC RX 258: Performed by: EMERGENCY MEDICINE

## 2019-11-11 PROCEDURE — 6360000002 HC RX W HCPCS: Performed by: EMERGENCY MEDICINE

## 2019-11-11 PROCEDURE — 96375 TX/PRO/DX INJ NEW DRUG ADDON: CPT

## 2019-11-11 PROCEDURE — 80053 COMPREHEN METABOLIC PANEL: CPT

## 2019-11-11 PROCEDURE — 99284 EMERGENCY DEPT VISIT MOD MDM: CPT

## 2019-11-11 PROCEDURE — 36415 COLL VENOUS BLD VENIPUNCTURE: CPT

## 2019-11-11 PROCEDURE — 76775 US EXAM ABDO BACK WALL LIM: CPT

## 2019-11-11 PROCEDURE — 76817 TRANSVAGINAL US OBSTETRIC: CPT

## 2019-11-11 PROCEDURE — 96374 THER/PROPH/DIAG INJ IV PUSH: CPT

## 2019-11-11 PROCEDURE — 6370000000 HC RX 637 (ALT 250 FOR IP): Performed by: EMERGENCY MEDICINE

## 2019-11-11 PROCEDURE — 85025 COMPLETE CBC W/AUTO DIFF WBC: CPT

## 2019-11-11 RX ORDER — SODIUM CHLORIDE 0.9 % (FLUSH) 0.9 %
SYRINGE (ML) INJECTION
Status: DISCONTINUED
Start: 2019-11-11 | End: 2019-11-11 | Stop reason: HOSPADM

## 2019-11-11 RX ORDER — 0.9 % SODIUM CHLORIDE 0.9 %
1000 INTRAVENOUS SOLUTION INTRAVENOUS ONCE
Status: COMPLETED | OUTPATIENT
Start: 2019-11-11 | End: 2019-11-11

## 2019-11-11 RX ORDER — DIPHENHYDRAMINE HYDROCHLORIDE 50 MG/ML
25 INJECTION INTRAMUSCULAR; INTRAVENOUS ONCE
Status: COMPLETED | OUTPATIENT
Start: 2019-11-11 | End: 2019-11-11

## 2019-11-11 RX ORDER — CEPHALEXIN 500 MG/1
500 CAPSULE ORAL 4 TIMES DAILY
Qty: 40 CAPSULE | Refills: 0 | Status: SHIPPED | OUTPATIENT
Start: 2019-11-11 | End: 2019-11-21

## 2019-11-11 RX ORDER — DOXYLAMINE SUCCINATE AND PYRIDOXINE HYDROCHLORIDE, DELAYED RELEASE TABLETS 10 MG/10 MG 10; 10 MG/1; MG/1
TABLET, DELAYED RELEASE ORAL
Qty: 15 TABLET | Refills: 0 | Status: SHIPPED | OUTPATIENT
Start: 2019-11-11 | End: 2020-10-09 | Stop reason: ALTCHOICE

## 2019-11-11 RX ORDER — CEPHALEXIN 250 MG/1
500 CAPSULE ORAL ONCE
Status: COMPLETED | OUTPATIENT
Start: 2019-11-11 | End: 2019-11-11

## 2019-11-11 RX ORDER — ONDANSETRON 2 MG/ML
4 INJECTION INTRAMUSCULAR; INTRAVENOUS ONCE
Status: DISCONTINUED | OUTPATIENT
Start: 2019-11-11 | End: 2019-11-11

## 2019-11-11 RX ORDER — ACETAMINOPHEN 325 MG/1
650 TABLET ORAL ONCE
Status: COMPLETED | OUTPATIENT
Start: 2019-11-11 | End: 2019-11-11

## 2019-11-11 RX ORDER — METOCLOPRAMIDE HYDROCHLORIDE 5 MG/ML
10 INJECTION INTRAMUSCULAR; INTRAVENOUS ONCE
Status: COMPLETED | OUTPATIENT
Start: 2019-11-11 | End: 2019-11-11

## 2019-11-11 RX ADMIN — CEPHALEXIN 500 MG: 250 CAPSULE ORAL at 13:04

## 2019-11-11 RX ADMIN — DIPHENHYDRAMINE HYDROCHLORIDE 25 MG: 50 INJECTION INTRAMUSCULAR; INTRAVENOUS at 11:03

## 2019-11-11 RX ADMIN — ACETAMINOPHEN 650 MG: 325 TABLET, FILM COATED ORAL at 13:03

## 2019-11-11 RX ADMIN — METOCLOPRAMIDE 10 MG: 5 INJECTION, SOLUTION INTRAMUSCULAR; INTRAVENOUS at 11:03

## 2019-11-11 RX ADMIN — SODIUM CHLORIDE 1000 ML: 9 INJECTION, SOLUTION INTRAVENOUS at 11:02

## 2019-11-11 ASSESSMENT — PAIN DESCRIPTION - FREQUENCY: FREQUENCY: CONTINUOUS

## 2019-11-11 ASSESSMENT — ENCOUNTER SYMPTOMS
CONSTIPATION: 0
SHORTNESS OF BREATH: 0
DIARRHEA: 0
VOMITING: 0
NAUSEA: 1
ABDOMINAL PAIN: 1

## 2019-11-11 ASSESSMENT — PAIN DESCRIPTION - PAIN TYPE: TYPE: ACUTE PAIN

## 2019-11-11 ASSESSMENT — PAIN SCALES - GENERAL: PAINLEVEL_OUTOF10: 8

## 2019-11-11 ASSESSMENT — PAIN DESCRIPTION - DESCRIPTORS: DESCRIPTORS: CONSTANT

## 2019-11-11 ASSESSMENT — PAIN DESCRIPTION - ORIENTATION: ORIENTATION: RIGHT

## 2019-11-11 ASSESSMENT — PAIN DESCRIPTION - LOCATION: LOCATION: FLANK;ABDOMEN;GROIN

## 2020-03-11 ENCOUNTER — APPOINTMENT (OUTPATIENT)
Dept: ULTRASOUND IMAGING | Age: 35
End: 2020-03-11
Payer: COMMERCIAL

## 2020-03-11 ENCOUNTER — HOSPITAL ENCOUNTER (OUTPATIENT)
Age: 35
Setting detail: OBSERVATION
Discharge: HOME | End: 2020-03-12
Attending: OBSTETRICS & GYNECOLOGY | Admitting: OBSTETRICS & GYNECOLOGY
Payer: COMMERCIAL

## 2020-03-11 LAB
ABO/RH: NORMAL
ALBUMIN SERPL-MCNC: 3.8 G/DL (ref 3.5–5.2)
ALP BLD-CCNC: 76 U/L (ref 35–104)
ALT SERPL-CCNC: 7 U/L (ref 0–32)
AMPHETAMINE SCREEN, URINE: NOT DETECTED
ANION GAP SERPL CALCULATED.3IONS-SCNC: 17 MMOL/L (ref 7–16)
ANTIBODY SCREEN: NORMAL
AST SERPL-CCNC: 15 U/L (ref 0–31)
BACTERIA: ABNORMAL /HPF
BARBITURATE SCREEN URINE: NOT DETECTED
BASOPHILS ABSOLUTE: 0.05 E9/L (ref 0–0.2)
BASOPHILS RELATIVE PERCENT: 0.3 % (ref 0–2)
BENZODIAZEPINE SCREEN, URINE: NOT DETECTED
BILIRUB SERPL-MCNC: 0.2 MG/DL (ref 0–1.2)
BILIRUBIN URINE: NEGATIVE
BLOOD, URINE: NEGATIVE
BUN BLDV-MCNC: 6 MG/DL (ref 6–20)
CALCIUM SERPL-MCNC: 9.1 MG/DL (ref 8.6–10.2)
CANNABINOID SCREEN URINE: NOT DETECTED
CHLORIDE BLD-SCNC: 100 MMOL/L (ref 98–107)
CLARITY: ABNORMAL
CO2: 18 MMOL/L (ref 22–29)
COCAINE METABOLITE SCREEN URINE: NOT DETECTED
COLOR: YELLOW
CREAT SERPL-MCNC: 0.4 MG/DL (ref 0.5–1)
EOSINOPHILS ABSOLUTE: 0.12 E9/L (ref 0.05–0.5)
EOSINOPHILS RELATIVE PERCENT: 0.7 % (ref 0–6)
EPITHELIAL CELLS, UA: ABNORMAL /HPF
FENTANYL SCREEN, URINE: NOT DETECTED
FETAL FIBRONECTIN: NEGATIVE
GFR AFRICAN AMERICAN: >60
GFR NON-AFRICAN AMERICAN: >60 ML/MIN/1.73
GLUCOSE BLD-MCNC: 92 MG/DL (ref 74–99)
GLUCOSE URINE: NEGATIVE MG/DL
HCT VFR BLD CALC: 38.9 % (ref 34–48)
HEMOGLOBIN: 12.5 G/DL (ref 11.5–15.5)
IMMATURE GRANULOCYTES #: 0.14 E9/L
IMMATURE GRANULOCYTES %: 0.8 % (ref 0–5)
KETONES, URINE: 15 MG/DL
LEUKOCYTE ESTERASE, URINE: ABNORMAL
LYMPHOCYTES ABSOLUTE: 1.34 E9/L (ref 1.5–4)
LYMPHOCYTES RELATIVE PERCENT: 8.1 % (ref 20–42)
Lab: NORMAL
MCH RBC QN AUTO: 28.3 PG (ref 26–35)
MCHC RBC AUTO-ENTMCNC: 32.1 % (ref 32–34.5)
MCV RBC AUTO: 88 FL (ref 80–99.9)
METHADONE SCREEN, URINE: NOT DETECTED
MONOCYTES ABSOLUTE: 0.76 E9/L (ref 0.1–0.95)
MONOCYTES RELATIVE PERCENT: 4.6 % (ref 2–12)
NEUTROPHILS ABSOLUTE: 14.19 E9/L (ref 1.8–7.3)
NEUTROPHILS RELATIVE PERCENT: 85.5 % (ref 43–80)
NITRITE, URINE: POSITIVE
OPIATE SCREEN URINE: NOT DETECTED
OXYCODONE URINE: NOT DETECTED
PDW BLD-RTO: 13.5 FL (ref 11.5–15)
PH UA: 6.5 (ref 5–9)
PHENCYCLIDINE SCREEN URINE: NOT DETECTED
PLATELET # BLD: 213 E9/L (ref 130–450)
PMV BLD AUTO: 11.6 FL (ref 7–12)
POTASSIUM SERPL-SCNC: 4.1 MMOL/L (ref 3.5–5)
PROTEIN UA: NEGATIVE MG/DL
RAPID HIV 1&2: NORMAL
RBC # BLD: 4.42 E12/L (ref 3.5–5.5)
RBC UA: ABNORMAL /HPF (ref 0–2)
SODIUM BLD-SCNC: 135 MMOL/L (ref 132–146)
SPECIFIC GRAVITY UA: 1.02 (ref 1–1.03)
TOTAL PROTEIN: 7.3 G/DL (ref 6.4–8.3)
UROBILINOGEN, URINE: 0.2 E.U./DL
WBC # BLD: 16.6 E9/L (ref 4.5–11.5)
WBC UA: ABNORMAL /HPF (ref 0–5)

## 2020-03-11 PROCEDURE — 87591 N.GONORRHOEAE DNA AMP PROB: CPT

## 2020-03-11 PROCEDURE — 82731 ASSAY OF FETAL FIBRONECTIN: CPT

## 2020-03-11 PROCEDURE — 87340 HEPATITIS B SURFACE AG IA: CPT

## 2020-03-11 PROCEDURE — 81001 URINALYSIS AUTO W/SCOPE: CPT

## 2020-03-11 PROCEDURE — 36415 COLL VENOUS BLD VENIPUNCTURE: CPT

## 2020-03-11 PROCEDURE — G0378 HOSPITAL OBSERVATION PER HR: HCPCS

## 2020-03-11 PROCEDURE — G0379 DIRECT REFER HOSPITAL OBSERV: HCPCS

## 2020-03-11 PROCEDURE — 86592 SYPHILIS TEST NON-TREP QUAL: CPT

## 2020-03-11 PROCEDURE — 85025 COMPLETE CBC W/AUTO DIFF WBC: CPT

## 2020-03-11 PROCEDURE — 2580000003 HC RX 258: Performed by: OBSTETRICS & GYNECOLOGY

## 2020-03-11 PROCEDURE — 96361 HYDRATE IV INFUSION ADD-ON: CPT

## 2020-03-11 PROCEDURE — 6360000002 HC RX W HCPCS: Performed by: OBSTETRICS & GYNECOLOGY

## 2020-03-11 PROCEDURE — 80307 DRUG TEST PRSMV CHEM ANLYZR: CPT

## 2020-03-11 PROCEDURE — 76770 US EXAM ABDO BACK WALL COMP: CPT

## 2020-03-11 PROCEDURE — 6370000000 HC RX 637 (ALT 250 FOR IP): Performed by: OBSTETRICS & GYNECOLOGY

## 2020-03-11 PROCEDURE — 96360 HYDRATION IV INFUSION INIT: CPT

## 2020-03-11 PROCEDURE — 80053 COMPREHEN METABOLIC PANEL: CPT

## 2020-03-11 PROCEDURE — 96372 THER/PROPH/DIAG INJ SC/IM: CPT

## 2020-03-11 PROCEDURE — 86900 BLOOD TYPING SEROLOGIC ABO: CPT

## 2020-03-11 PROCEDURE — 96374 THER/PROPH/DIAG INJ IV PUSH: CPT

## 2020-03-11 PROCEDURE — 86850 RBC ANTIBODY SCREEN: CPT

## 2020-03-11 PROCEDURE — 87491 CHLMYD TRACH DNA AMP PROBE: CPT

## 2020-03-11 PROCEDURE — 86803 HEPATITIS C AB TEST: CPT

## 2020-03-11 PROCEDURE — 87081 CULTURE SCREEN ONLY: CPT

## 2020-03-11 PROCEDURE — 86901 BLOOD TYPING SEROLOGIC RH(D): CPT

## 2020-03-11 PROCEDURE — 86701 HIV-1ANTIBODY: CPT

## 2020-03-11 PROCEDURE — 96376 TX/PRO/DX INJ SAME DRUG ADON: CPT

## 2020-03-11 RX ORDER — MEPERIDINE HYDROCHLORIDE 50 MG/ML
50 INJECTION INTRAMUSCULAR; INTRAVENOUS; SUBCUTANEOUS
Status: DISCONTINUED | OUTPATIENT
Start: 2020-03-11 | End: 2020-03-12

## 2020-03-11 RX ORDER — ONDANSETRON 2 MG/ML
4 INJECTION INTRAMUSCULAR; INTRAVENOUS EVERY 6 HOURS PRN
Status: DISCONTINUED | OUTPATIENT
Start: 2020-03-11 | End: 2020-03-12 | Stop reason: HOSPADM

## 2020-03-11 RX ORDER — TERBUTALINE SULFATE 1 MG/ML
0.25 INJECTION, SOLUTION SUBCUTANEOUS PRN
Status: DISCONTINUED | OUTPATIENT
Start: 2020-03-11 | End: 2020-03-12 | Stop reason: HOSPADM

## 2020-03-11 RX ORDER — ACETAMINOPHEN 325 MG/1
650 TABLET ORAL EVERY 4 HOURS PRN
Status: DISCONTINUED | OUTPATIENT
Start: 2020-03-11 | End: 2020-03-12 | Stop reason: HOSPADM

## 2020-03-11 RX ORDER — SODIUM CHLORIDE, SODIUM LACTATE, POTASSIUM CHLORIDE, CALCIUM CHLORIDE 600; 310; 30; 20 MG/100ML; MG/100ML; MG/100ML; MG/100ML
INJECTION, SOLUTION INTRAVENOUS CONTINUOUS
Status: DISCONTINUED | OUTPATIENT
Start: 2020-03-11 | End: 2020-03-12 | Stop reason: HOSPADM

## 2020-03-11 RX ORDER — MEPERIDINE HYDROCHLORIDE 50 MG/ML
50 INJECTION INTRAMUSCULAR; INTRAVENOUS; SUBCUTANEOUS
Status: DISCONTINUED | OUTPATIENT
Start: 2020-03-11 | End: 2020-03-11

## 2020-03-11 RX ORDER — SODIUM CHLORIDE 0.9 % (FLUSH) 0.9 %
SYRINGE (ML) INJECTION
Status: DISPENSED
Start: 2020-03-11 | End: 2020-03-12

## 2020-03-11 RX ADMIN — MEPERIDINE HYDROCHLORIDE 50 MG: 50 INJECTION INTRAMUSCULAR; INTRAVENOUS; SUBCUTANEOUS at 14:44

## 2020-03-11 RX ADMIN — ONDANSETRON 4 MG: 2 INJECTION INTRAMUSCULAR; INTRAVENOUS at 20:51

## 2020-03-11 RX ADMIN — TERBUTALINE SULFATE 0.25 MG: 1 INJECTION, SOLUTION SUBCUTANEOUS at 13:37

## 2020-03-11 RX ADMIN — MEPERIDINE HYDROCHLORIDE 50 MG: 50 INJECTION INTRAMUSCULAR; INTRAVENOUS; SUBCUTANEOUS at 19:11

## 2020-03-11 RX ADMIN — ONDANSETRON 4 MG: 2 INJECTION INTRAMUSCULAR; INTRAVENOUS at 14:37

## 2020-03-11 RX ADMIN — SODIUM CHLORIDE, POTASSIUM CHLORIDE, SODIUM LACTATE AND CALCIUM CHLORIDE: 600; 310; 30; 20 INJECTION, SOLUTION INTRAVENOUS at 13:40

## 2020-03-11 RX ADMIN — TERBUTALINE SULFATE 0.25 MG: 1 INJECTION, SOLUTION SUBCUTANEOUS at 14:07

## 2020-03-11 RX ADMIN — ACETAMINOPHEN 650 MG: 325 TABLET, FILM COATED ORAL at 18:08

## 2020-03-11 ASSESSMENT — PAIN SCALES - GENERAL
PAINLEVEL_OUTOF10: 10
PAINLEVEL_OUTOF10: 9
PAINLEVEL_OUTOF10: 6

## 2020-03-11 NOTE — H&P
Subjective:      Lucien Fitzgerald is an 37 y.o. female at 32 and 6/7 weeks gestation presenting with   Chief Complaint   Patient presents with    Contractions   . She is also complaining of contractions every a few minutes lasting few seconds. Other associated symptoms include low back pain. Fetal Movement: normal.  No prenatal care in the current pregnancy. History of kidney stones  Past Medical History:   Diagnosis Date    Abnormal Pap smear of cervix     Chronic kidney disease     Knee effusion     Mental disorder     Postpartum depression        Review of Systems  Pertinent items are noted in HPI. Objective:      /66   Pulse 106   Temp 98.2 °F (36.8 °C) (Oral)   Resp 20   LMP 09/10/2019   Blood pressure 131/66, pulse 106, temperature 98.2 °F (36.8 °C), temperature source Oral, resp. rate 20, last menstrual period 09/10/2019, unknown if currently breastfeeding. General:   alert, appears stated age and cooperative   Cervix:   closed.    FHT:   145 BPM   Positive right CVA tenderness  Lab Review    CBC:   Lab Results   Component Value Date    WBC 16.6 03/11/2020    RBC 4.42 03/11/2020    HGB 12.5 03/11/2020    HCT 38.9 03/11/2020    MCV 88.0 03/11/2020    MCH 28.3 03/11/2020    MCHC 32.1 03/11/2020    RDW 13.5 03/11/2020     03/11/2020    MPV 11.6 03/11/2020     CMP:    Lab Results   Component Value Date     03/11/2020    K 4.1 03/11/2020     03/11/2020    CO2 18 03/11/2020    BUN 6 03/11/2020    CREATININE 0.4 03/11/2020    GFRAA >60 03/11/2020    LABGLOM >60 03/11/2020    GLUCOSE 92 03/11/2020    GLUCOSE 92 11/23/2010    PROT 7.3 03/11/2020    LABALBU 3.8 03/11/2020    LABALBU 4.5 11/23/2010    CALCIUM 9.1 03/11/2020    BILITOT 0.2 03/11/2020    ALKPHOS 76 03/11/2020    AST 15 03/11/2020    ALT 7 03/11/2020     U/A:    Lab Results   Component Value Date    COLORU Yellow 03/11/2020    PHUR 6.5 03/11/2020    LABCAST FEW 11/14/2016    WBCUA 10-20 03/11/2020    WBCUA 2-5 11/23/2010 RBCUA NONE 03/11/2020    RBCUA 0-1 11/23/2010    MUCUS Present 06/05/2019    TRICHOMONAS RARE 07/05/2019    BACTERIA MANY 03/11/2020    CLARITYU SLCLOUDY 03/11/2020    SPECGRAV 1.025 03/11/2020    LEUKOCYTESUR SMALL 03/11/2020    UROBILINOGEN 0.2 03/11/2020    BILIRUBINUR Negative 03/11/2020    BILIRUBINUR NEGATIVE 11/23/2010    BLOODU Negative 03/11/2020    GLUCOSEU Negative 03/11/2020    GLUCOSEU NEGATIVE 11/23/2010    AMORPHOUS MANY 10/05/2015          Assessment:      Premature Labor    Rule out kidney stones     Plan:      Monitored for contractions - findings: uterine irritability and reactive strip. Orders: Terbutaline 0.25 mg SQ, IV D5LR started and renal ultrasound and pain management.      Clayton Head MD,3/11/2020 5:30 PM

## 2020-03-12 VITALS
RESPIRATION RATE: 16 BRPM | OXYGEN SATURATION: 98 % | SYSTOLIC BLOOD PRESSURE: 117 MMHG | HEART RATE: 81 BPM | DIASTOLIC BLOOD PRESSURE: 59 MMHG | TEMPERATURE: 98.4 F

## 2020-03-12 PROBLEM — Z3A.28 28 WEEKS GESTATION OF PREGNANCY: Status: ACTIVE | Noted: 2020-03-12

## 2020-03-12 PROBLEM — N20.0 RIGHT KIDNEY STONE: Status: ACTIVE | Noted: 2020-03-12

## 2020-03-12 LAB
HEPATITIS B SURFACE ANTIGEN INTERPRETATION: NORMAL
HEPATITIS C ANTIBODY INTERPRETATION: NORMAL
RPR: NORMAL

## 2020-03-12 PROCEDURE — G0378 HOSPITAL OBSERVATION PER HR: HCPCS

## 2020-03-12 PROCEDURE — 6360000002 HC RX W HCPCS

## 2020-03-12 PROCEDURE — 96372 THER/PROPH/DIAG INJ SC/IM: CPT

## 2020-03-12 PROCEDURE — 96367 TX/PROPH/DG ADDL SEQ IV INF: CPT

## 2020-03-12 PROCEDURE — 96365 THER/PROPH/DIAG IV INF INIT: CPT

## 2020-03-12 PROCEDURE — 2580000003 HC RX 258: Performed by: OBSTETRICS & GYNECOLOGY

## 2020-03-12 PROCEDURE — 6370000000 HC RX 637 (ALT 250 FOR IP): Performed by: OBSTETRICS & GYNECOLOGY

## 2020-03-12 PROCEDURE — 6360000002 HC RX W HCPCS: Performed by: OBSTETRICS & GYNECOLOGY

## 2020-03-12 PROCEDURE — 96376 TX/PRO/DX INJ SAME DRUG ADON: CPT

## 2020-03-12 PROCEDURE — 96375 TX/PRO/DX INJ NEW DRUG ADDON: CPT

## 2020-03-12 RX ADMIN — BUTORPHANOL TARTRATE 1 MG: 2 INJECTION, SOLUTION INTRAMUSCULAR; INTRAVENOUS at 13:26

## 2020-03-12 RX ADMIN — ACETAMINOPHEN 650 MG: 325 TABLET, FILM COATED ORAL at 12:54

## 2020-03-12 RX ADMIN — BUTORPHANOL TARTRATE 1 MG: 2 INJECTION, SOLUTION INTRAMUSCULAR; INTRAVENOUS at 04:39

## 2020-03-12 RX ADMIN — BUTORPHANOL TARTRATE 1 MG: 2 INJECTION, SOLUTION INTRAMUSCULAR; INTRAVENOUS at 09:14

## 2020-03-12 RX ADMIN — SODIUM CHLORIDE, POTASSIUM CHLORIDE, SODIUM LACTATE AND CALCIUM CHLORIDE: 600; 310; 30; 20 INJECTION, SOLUTION INTRAVENOUS at 07:49

## 2020-03-12 RX ADMIN — ACETAMINOPHEN 650 MG: 325 TABLET, FILM COATED ORAL at 00:02

## 2020-03-12 RX ADMIN — BUTORPHANOL TARTRATE 1 MG: 2 INJECTION, SOLUTION INTRAMUSCULAR; INTRAVENOUS at 00:39

## 2020-03-12 RX ADMIN — SODIUM CHLORIDE, POTASSIUM CHLORIDE, SODIUM LACTATE AND CALCIUM CHLORIDE: 600; 310; 30; 20 INJECTION, SOLUTION INTRAVENOUS at 13:26

## 2020-03-12 ASSESSMENT — PAIN SCALES - GENERAL
PAINLEVEL_OUTOF10: 6
PAINLEVEL_OUTOF10: 8
PAINLEVEL_OUTOF10: 7
PAINLEVEL_OUTOF10: 4
PAINLEVEL_OUTOF10: 8
PAINLEVEL_OUTOF10: 7
PAINLEVEL_OUTOF10: 3

## 2020-03-12 ASSESSMENT — PAIN DESCRIPTION - DESCRIPTORS
DESCRIPTORS: DULL
DESCRIPTORS: ACHING;SHARP

## 2020-03-12 NOTE — PROGRESS NOTES
Discharge instructions reviewed with patient. Verbalized understanding. No questions voiced when asked. Given urine strainer.

## 2020-03-12 NOTE — PROGRESS NOTES
Assumed care of patient for the 11pm-7am shift. Plan of care for night discussed, patient verbalizes understanding. Call light placed within reach.

## 2020-03-12 NOTE — PROGRESS NOTES
Feeling much better. Renal sonogram confirmed the presence of a kidney stone. IV and p.o. hydration underway. Patient did not pass any kidney stones so far. Pain under much better control. Blood pressure (!) 117/59, pulse 81, temperature 98.4 °F (36.9 °C), temperature source Oral, resp. rate 16, last menstrual period 09/10/2019, SpO2 98 %, unknown if currently breastfeeding. Fetal heart tones are reactive for gestational age  [facility] shows no contractions    Assessment  28 weeks gestation  Kidney stones, right    Plan  Advised patient to continue p.o. hydration as much as possible. Will discharge home. Patient to follow-up in the office next week.

## 2020-03-12 NOTE — PROGRESS NOTES
Report received and care assumed. Plan of care discussed with patient. verbalized understanding, no questions voiced when asked. Plan of care discussed with patient. verbalized understanding, no questions voiced when asked.  Patient states she has been straining her urine

## 2020-03-14 LAB — GROUP B STREP CULTURE: NORMAL

## 2020-03-15 ENCOUNTER — HOSPITAL ENCOUNTER (INPATIENT)
Age: 35
LOS: 4 days | Discharge: HOME | DRG: 566 | End: 2020-03-20
Attending: OBSTETRICS & GYNECOLOGY | Admitting: OBSTETRICS & GYNECOLOGY
Payer: COMMERCIAL

## 2020-03-15 PROBLEM — Z34.93 NORMAL PREGNANCY IN THIRD TRIMESTER: Status: ACTIVE | Noted: 2020-03-15

## 2020-03-15 LAB
BACTERIA: ABNORMAL /HPF
BASOPHILS ABSOLUTE: 0.03 E9/L (ref 0–0.2)
BASOPHILS RELATIVE PERCENT: 0.2 % (ref 0–2)
BILIRUBIN URINE: NEGATIVE
BLOOD, URINE: NEGATIVE
CLARITY: CLEAR
COLOR: YELLOW
EOSINOPHILS ABSOLUTE: 0.06 E9/L (ref 0.05–0.5)
EOSINOPHILS RELATIVE PERCENT: 0.4 % (ref 0–6)
EPITHELIAL CELLS, UA: ABNORMAL /HPF
GLUCOSE URINE: NEGATIVE MG/DL
HCT VFR BLD CALC: 32.3 % (ref 34–48)
HEMOGLOBIN: 10.6 G/DL (ref 11.5–15.5)
IMMATURE GRANULOCYTES #: 0.1 E9/L
IMMATURE GRANULOCYTES %: 0.6 % (ref 0–5)
KETONES, URINE: NEGATIVE MG/DL
LEUKOCYTE ESTERASE, URINE: ABNORMAL
LYMPHOCYTES ABSOLUTE: 0.96 E9/L (ref 1.5–4)
LYMPHOCYTES RELATIVE PERCENT: 6.2 % (ref 20–42)
MCH RBC QN AUTO: 28.6 PG (ref 26–35)
MCHC RBC AUTO-ENTMCNC: 32.8 % (ref 32–34.5)
MCV RBC AUTO: 87.3 FL (ref 80–99.9)
MONOCYTES ABSOLUTE: 1.08 E9/L (ref 0.1–0.95)
MONOCYTES RELATIVE PERCENT: 6.9 % (ref 2–12)
NEUTROPHILS ABSOLUTE: 13.35 E9/L (ref 1.8–7.3)
NEUTROPHILS RELATIVE PERCENT: 85.7 % (ref 43–80)
NITRITE, URINE: NEGATIVE
PDW BLD-RTO: 13.5 FL (ref 11.5–15)
PH UA: 7 (ref 5–9)
PLATELET # BLD: 181 E9/L (ref 130–450)
PMV BLD AUTO: 11.7 FL (ref 7–12)
PROTEIN UA: NEGATIVE MG/DL
RBC # BLD: 3.7 E12/L (ref 3.5–5.5)
RBC UA: ABNORMAL /HPF (ref 0–2)
SPECIFIC GRAVITY UA: 1.02 (ref 1–1.03)
UROBILINOGEN, URINE: 1 E.U./DL
WBC # BLD: 15.6 E9/L (ref 4.5–11.5)
WBC UA: ABNORMAL /HPF (ref 0–5)

## 2020-03-15 PROCEDURE — 6360000002 HC RX W HCPCS: Performed by: OBSTETRICS & GYNECOLOGY

## 2020-03-15 PROCEDURE — 36415 COLL VENOUS BLD VENIPUNCTURE: CPT

## 2020-03-15 PROCEDURE — G0378 HOSPITAL OBSERVATION PER HR: HCPCS

## 2020-03-15 PROCEDURE — 96360 HYDRATION IV INFUSION INIT: CPT

## 2020-03-15 PROCEDURE — 6370000000 HC RX 637 (ALT 250 FOR IP): Performed by: OBSTETRICS & GYNECOLOGY

## 2020-03-15 PROCEDURE — G0379 DIRECT REFER HOSPITAL OBSERV: HCPCS

## 2020-03-15 PROCEDURE — 2500000003 HC RX 250 WO HCPCS: Performed by: OBSTETRICS & GYNECOLOGY

## 2020-03-15 PROCEDURE — 96372 THER/PROPH/DIAG INJ SC/IM: CPT

## 2020-03-15 PROCEDURE — 96361 HYDRATE IV INFUSION ADD-ON: CPT

## 2020-03-15 PROCEDURE — 2580000003 HC RX 258: Performed by: OBSTETRICS & GYNECOLOGY

## 2020-03-15 PROCEDURE — 96365 THER/PROPH/DIAG IV INF INIT: CPT

## 2020-03-15 PROCEDURE — 85025 COMPLETE CBC W/AUTO DIFF WBC: CPT

## 2020-03-15 PROCEDURE — 81001 URINALYSIS AUTO W/SCOPE: CPT

## 2020-03-15 PROCEDURE — 96367 TX/PROPH/DG ADDL SEQ IV INF: CPT

## 2020-03-15 RX ORDER — SODIUM CHLORIDE, SODIUM LACTATE, POTASSIUM CHLORIDE, CALCIUM CHLORIDE 600; 310; 30; 20 MG/100ML; MG/100ML; MG/100ML; MG/100ML
INJECTION, SOLUTION INTRAVENOUS CONTINUOUS
Status: DISCONTINUED | OUTPATIENT
Start: 2020-03-15 | End: 2020-03-20 | Stop reason: HOSPADM

## 2020-03-15 RX ORDER — CLINDAMYCIN PHOSPHATE 900 MG/50ML
900 INJECTION INTRAVENOUS EVERY 8 HOURS
Status: DISCONTINUED | OUTPATIENT
Start: 2020-03-15 | End: 2020-03-20 | Stop reason: HOSPADM

## 2020-03-15 RX ORDER — MORPHINE SULFATE 2 MG/ML
2 INJECTION, SOLUTION INTRAMUSCULAR; INTRAVENOUS EVERY 4 HOURS PRN
Status: DISCONTINUED | OUTPATIENT
Start: 2020-03-15 | End: 2020-03-20 | Stop reason: HOSPADM

## 2020-03-15 RX ORDER — ACETAMINOPHEN 325 MG/1
650 TABLET ORAL EVERY 4 HOURS PRN
Status: DISCONTINUED | OUTPATIENT
Start: 2020-03-15 | End: 2020-03-20 | Stop reason: HOSPADM

## 2020-03-15 RX ORDER — SODIUM CHLORIDE, SODIUM LACTATE, POTASSIUM CHLORIDE, AND CALCIUM CHLORIDE .6; .31; .03; .02 G/100ML; G/100ML; G/100ML; G/100ML
500 INJECTION, SOLUTION INTRAVENOUS ONCE
Status: DISCONTINUED | OUTPATIENT
Start: 2020-03-15 | End: 2020-03-20 | Stop reason: HOSPADM

## 2020-03-15 RX ORDER — MEPERIDINE HYDROCHLORIDE 50 MG/ML
50 INJECTION INTRAMUSCULAR; INTRAVENOUS; SUBCUTANEOUS EVERY 4 HOURS PRN
Status: DISCONTINUED | OUTPATIENT
Start: 2020-03-15 | End: 2020-03-15

## 2020-03-15 RX ORDER — GENTAMICIN SULFATE 80 MG/50ML
80 INJECTION, SOLUTION INTRAVENOUS EVERY 8 HOURS
Status: DISCONTINUED | OUTPATIENT
Start: 2020-03-15 | End: 2020-03-20 | Stop reason: HOSPADM

## 2020-03-15 RX ADMIN — MORPHINE SULFATE 2 MG: 2 INJECTION, SOLUTION INTRAMUSCULAR; INTRAVENOUS at 11:37

## 2020-03-15 RX ADMIN — ACETAMINOPHEN 650 MG: 325 TABLET, FILM COATED ORAL at 13:34

## 2020-03-15 RX ADMIN — GENTAMICIN SULFATE 80 MG: 80 INJECTION, SOLUTION INTRAVENOUS at 20:25

## 2020-03-15 RX ADMIN — CLINDAMYCIN IN 5 PERCENT DEXTROSE 900 MG: 18 INJECTION, SOLUTION INTRAVENOUS at 20:01

## 2020-03-15 RX ADMIN — MORPHINE SULFATE 2 MG: 2 INJECTION, SOLUTION INTRAMUSCULAR; INTRAVENOUS at 15:31

## 2020-03-15 RX ADMIN — MORPHINE SULFATE 2 MG: 2 INJECTION, SOLUTION INTRAMUSCULAR; INTRAVENOUS at 21:26

## 2020-03-15 RX ADMIN — SODIUM CHLORIDE, POTASSIUM CHLORIDE, SODIUM LACTATE AND CALCIUM CHLORIDE: 600; 310; 30; 20 INJECTION, SOLUTION INTRAVENOUS at 16:00

## 2020-03-15 RX ADMIN — ACETAMINOPHEN 650 MG: 325 TABLET, FILM COATED ORAL at 18:30

## 2020-03-15 RX ADMIN — ACETAMINOPHEN 650 MG: 325 TABLET, FILM COATED ORAL at 23:00

## 2020-03-15 ASSESSMENT — PAIN SCALES - GENERAL
PAINLEVEL_OUTOF10: 10
PAINLEVEL_OUTOF10: 7
PAINLEVEL_OUTOF10: 7
PAINLEVEL_OUTOF10: 8
PAINLEVEL_OUTOF10: 7
PAINLEVEL_OUTOF10: 7

## 2020-03-15 NOTE — H&P
Present 06/05/2019    TRICHOMONAS RARE 07/05/2019    BACTERIA MANY 03/15/2020    CLARITYU Clear 03/15/2020    SPECGRAV 1.020 03/15/2020    LEUKOCYTESUR TRACE 03/15/2020    UROBILINOGEN 1.0 03/15/2020    BILIRUBINUR Negative 03/15/2020    BILIRUBINUR NEGATIVE 11/23/2010    BLOODU Negative 03/15/2020    GLUCOSEU Negative 03/15/2020    GLUCOSEU NEGATIVE 11/23/2010    AMORPHOUS MANY 10/05/2015          Assessment:     28 weeks and 3-day pregnancy  Right kidney stone  UTI in third trimester  Fever    Plan:     IV fluids  IV antibiotics  Supportive therapy    Clayton Head MD,3/15/2020 7:53 PM

## 2020-03-16 LAB
C TRACH DNA GENITAL QL NAA+PROBE: NEGATIVE
N. GONORRHOEAE DNA: NEGATIVE
SOURCE: NORMAL

## 2020-03-16 PROCEDURE — 96367 TX/PROPH/DG ADDL SEQ IV INF: CPT

## 2020-03-16 PROCEDURE — 96366 THER/PROPH/DIAG IV INF ADDON: CPT

## 2020-03-16 PROCEDURE — 96375 TX/PRO/DX INJ NEW DRUG ADDON: CPT

## 2020-03-16 PROCEDURE — 6370000000 HC RX 637 (ALT 250 FOR IP): Performed by: OBSTETRICS & GYNECOLOGY

## 2020-03-16 PROCEDURE — 96365 THER/PROPH/DIAG IV INF INIT: CPT

## 2020-03-16 PROCEDURE — 2580000003 HC RX 258: Performed by: OBSTETRICS & GYNECOLOGY

## 2020-03-16 PROCEDURE — G0378 HOSPITAL OBSERVATION PER HR: HCPCS

## 2020-03-16 PROCEDURE — 6360000002 HC RX W HCPCS: Performed by: OBSTETRICS & GYNECOLOGY

## 2020-03-16 PROCEDURE — 2500000003 HC RX 250 WO HCPCS: Performed by: OBSTETRICS & GYNECOLOGY

## 2020-03-16 PROCEDURE — 96372 THER/PROPH/DIAG INJ SC/IM: CPT

## 2020-03-16 PROCEDURE — 96361 HYDRATE IV INFUSION ADD-ON: CPT

## 2020-03-16 RX ORDER — PROMETHAZINE HYDROCHLORIDE 25 MG/ML
25 INJECTION, SOLUTION INTRAMUSCULAR; INTRAVENOUS EVERY 6 HOURS PRN
Status: DISCONTINUED | OUTPATIENT
Start: 2020-03-16 | End: 2020-03-17

## 2020-03-16 RX ORDER — MEPERIDINE HYDROCHLORIDE 50 MG/ML
50 INJECTION INTRAMUSCULAR; INTRAVENOUS; SUBCUTANEOUS EVERY 4 HOURS PRN
Status: DISCONTINUED | OUTPATIENT
Start: 2020-03-16 | End: 2020-03-20 | Stop reason: HOSPADM

## 2020-03-16 RX ORDER — CALCIUM CARBONATE 200(500)MG
500 TABLET,CHEWABLE ORAL 3 TIMES DAILY PRN
Status: DISCONTINUED | OUTPATIENT
Start: 2020-03-16 | End: 2020-03-20 | Stop reason: HOSPADM

## 2020-03-16 RX ADMIN — ACETAMINOPHEN 650 MG: 325 TABLET, FILM COATED ORAL at 03:48

## 2020-03-16 RX ADMIN — MORPHINE SULFATE 2 MG: 2 INJECTION, SOLUTION INTRAMUSCULAR; INTRAVENOUS at 01:50

## 2020-03-16 RX ADMIN — ACETAMINOPHEN 650 MG: 325 TABLET, FILM COATED ORAL at 13:23

## 2020-03-16 RX ADMIN — CLINDAMYCIN IN 5 PERCENT DEXTROSE 900 MG: 18 INJECTION, SOLUTION INTRAVENOUS at 03:48

## 2020-03-16 RX ADMIN — SODIUM CHLORIDE, POTASSIUM CHLORIDE, SODIUM LACTATE AND CALCIUM CHLORIDE: 600; 310; 30; 20 INJECTION, SOLUTION INTRAVENOUS at 23:13

## 2020-03-16 RX ADMIN — CALCIUM CARBONATE (ANTACID) CHEW TAB 500 MG 500 MG: 500 CHEW TAB at 14:00

## 2020-03-16 RX ADMIN — MORPHINE SULFATE 2 MG: 2 INJECTION, SOLUTION INTRAMUSCULAR; INTRAVENOUS at 15:04

## 2020-03-16 RX ADMIN — ACETAMINOPHEN 650 MG: 325 TABLET, FILM COATED ORAL at 08:30

## 2020-03-16 RX ADMIN — GENTAMICIN SULFATE 80 MG: 80 INJECTION, SOLUTION INTRAVENOUS at 05:03

## 2020-03-16 RX ADMIN — CLINDAMYCIN IN 5 PERCENT DEXTROSE 900 MG: 18 INJECTION, SOLUTION INTRAVENOUS at 12:30

## 2020-03-16 RX ADMIN — GENTAMICIN SULFATE 80 MG: 80 INJECTION, SOLUTION INTRAVENOUS at 13:48

## 2020-03-16 RX ADMIN — SODIUM CHLORIDE, POTASSIUM CHLORIDE, SODIUM LACTATE AND CALCIUM CHLORIDE: 600; 310; 30; 20 INJECTION, SOLUTION INTRAVENOUS at 00:05

## 2020-03-16 RX ADMIN — MORPHINE SULFATE 2 MG: 2 INJECTION, SOLUTION INTRAMUSCULAR; INTRAVENOUS at 06:10

## 2020-03-16 RX ADMIN — SODIUM CHLORIDE, POTASSIUM CHLORIDE, SODIUM LACTATE AND CALCIUM CHLORIDE: 600; 310; 30; 20 INJECTION, SOLUTION INTRAVENOUS at 09:29

## 2020-03-16 RX ADMIN — PROMETHAZINE HYDROCHLORIDE 25 MG: 25 INJECTION INTRAMUSCULAR; INTRAVENOUS at 18:45

## 2020-03-16 RX ADMIN — ACETAMINOPHEN 650 MG: 325 TABLET, FILM COATED ORAL at 23:23

## 2020-03-16 RX ADMIN — CLINDAMYCIN IN 5 PERCENT DEXTROSE 900 MG: 18 INJECTION, SOLUTION INTRAVENOUS at 21:14

## 2020-03-16 RX ADMIN — MEPERIDINE HYDROCHLORIDE 50 MG: 50 INJECTION INTRAMUSCULAR; INTRAVENOUS; SUBCUTANEOUS at 18:44

## 2020-03-16 RX ADMIN — ACETAMINOPHEN 650 MG: 325 TABLET, FILM COATED ORAL at 17:16

## 2020-03-16 RX ADMIN — MEPERIDINE HYDROCHLORIDE 50 MG: 50 INJECTION INTRAMUSCULAR; INTRAVENOUS; SUBCUTANEOUS at 23:13

## 2020-03-16 RX ADMIN — GENTAMICIN SULFATE 80 MG: 80 INJECTION, SOLUTION INTRAVENOUS at 23:13

## 2020-03-16 RX ADMIN — MORPHINE SULFATE 2 MG: 2 INJECTION, SOLUTION INTRAMUSCULAR; INTRAVENOUS at 10:47

## 2020-03-16 ASSESSMENT — PAIN SCALES - GENERAL
PAINLEVEL_OUTOF10: 8
PAINLEVEL_OUTOF10: 9
PAINLEVEL_OUTOF10: 7
PAINLEVEL_OUTOF10: 4
PAINLEVEL_OUTOF10: 8
PAINLEVEL_OUTOF10: 8
PAINLEVEL_OUTOF10: 9
PAINLEVEL_OUTOF10: 7
PAINLEVEL_OUTOF10: 7
PAINLEVEL_OUTOF10: 10
PAINLEVEL_OUTOF10: 10

## 2020-03-16 ASSESSMENT — PAIN DESCRIPTION - ONSET: ONSET: ON-GOING

## 2020-03-16 ASSESSMENT — PAIN DESCRIPTION - FREQUENCY: FREQUENCY: CONTINUOUS

## 2020-03-16 ASSESSMENT — PAIN DESCRIPTION - DESCRIPTORS
DESCRIPTORS: CRAMPING;ACHING
DESCRIPTORS: HEADACHE
DESCRIPTORS: ACHING;SHARP;SQUEEZING

## 2020-03-16 ASSESSMENT — PAIN DESCRIPTION - PAIN TYPE: TYPE: ACUTE PAIN

## 2020-03-16 ASSESSMENT — PAIN DESCRIPTION - ORIENTATION: ORIENTATION: MID;RIGHT

## 2020-03-16 ASSESSMENT — PAIN DESCRIPTION - PROGRESSION: CLINICAL_PROGRESSION: GRADUALLY WORSENING

## 2020-03-16 ASSESSMENT — PAIN - FUNCTIONAL ASSESSMENT: PAIN_FUNCTIONAL_ASSESSMENT: ACTIVITIES ARE NOT PREVENTED

## 2020-03-16 ASSESSMENT — PAIN DESCRIPTION - LOCATION: LOCATION: BACK;FLANK

## 2020-03-17 PROBLEM — O26.92 COMPLICATION OF PREGNANCY, SECOND TRIMESTER: Status: ACTIVE | Noted: 2020-03-17

## 2020-03-17 PROCEDURE — 1220000001 HC SEMI PRIVATE L&D R&B

## 2020-03-17 PROCEDURE — 6360000002 HC RX W HCPCS: Performed by: OBSTETRICS & GYNECOLOGY

## 2020-03-17 PROCEDURE — 2500000003 HC RX 250 WO HCPCS: Performed by: OBSTETRICS & GYNECOLOGY

## 2020-03-17 PROCEDURE — 6370000000 HC RX 637 (ALT 250 FOR IP): Performed by: OBSTETRICS & GYNECOLOGY

## 2020-03-17 PROCEDURE — 2580000003 HC RX 258: Performed by: OBSTETRICS & GYNECOLOGY

## 2020-03-17 RX ORDER — PROMETHAZINE HYDROCHLORIDE 25 MG/ML
25 INJECTION, SOLUTION INTRAMUSCULAR; INTRAVENOUS EVERY 6 HOURS PRN
Status: DISCONTINUED | OUTPATIENT
Start: 2020-03-17 | End: 2020-03-20 | Stop reason: HOSPADM

## 2020-03-17 RX ORDER — SODIUM PHOSPHATE,MONO-DIBASIC 19G-7G/118
1 ENEMA (ML) RECTAL ONCE
Status: DISCONTINUED | OUTPATIENT
Start: 2020-03-17 | End: 2020-03-20 | Stop reason: HOSPADM

## 2020-03-17 RX ORDER — SODIUM PHOSPHATE,MONO-DIBASIC 19G-7G/118
1 ENEMA (ML) RECTAL ONCE
Status: COMPLETED | OUTPATIENT
Start: 2020-03-17 | End: 2020-03-17

## 2020-03-17 RX ORDER — SODIUM PHOSPHATE, DIBASIC AND SODIUM PHOSPHATE, MONOBASIC 3.5; 9.5 G/66ML; G/66ML
ENEMA RECTAL ONCE
Status: DISCONTINUED | OUTPATIENT
Start: 2020-03-17 | End: 2020-03-17

## 2020-03-17 RX ADMIN — CLINDAMYCIN IN 5 PERCENT DEXTROSE 900 MG: 18 INJECTION, SOLUTION INTRAVENOUS at 13:04

## 2020-03-17 RX ADMIN — SODIUM CHLORIDE, POTASSIUM CHLORIDE, SODIUM LACTATE AND CALCIUM CHLORIDE: 600; 310; 30; 20 INJECTION, SOLUTION INTRAVENOUS at 15:42

## 2020-03-17 RX ADMIN — PROMETHAZINE HYDROCHLORIDE 25 MG: 25 INJECTION INTRAMUSCULAR; INTRAVENOUS at 09:22

## 2020-03-17 RX ADMIN — MEPERIDINE HYDROCHLORIDE 50 MG: 50 INJECTION INTRAMUSCULAR; INTRAVENOUS; SUBCUTANEOUS at 15:43

## 2020-03-17 RX ADMIN — ACETAMINOPHEN 650 MG: 325 TABLET, FILM COATED ORAL at 13:12

## 2020-03-17 RX ADMIN — PROMETHAZINE HYDROCHLORIDE 25 MG: 25 INJECTION INTRAMUSCULAR; INTRAVENOUS at 21:50

## 2020-03-17 RX ADMIN — SODIUM PHOSPHATE 1 ENEMA: 7; 19 ENEMA RECTAL at 11:17

## 2020-03-17 RX ADMIN — PROMETHAZINE HYDROCHLORIDE 25 MG: 25 INJECTION INTRAMUSCULAR; INTRAVENOUS at 15:43

## 2020-03-17 RX ADMIN — SODIUM CHLORIDE, POTASSIUM CHLORIDE, SODIUM LACTATE AND CALCIUM CHLORIDE: 600; 310; 30; 20 INJECTION, SOLUTION INTRAVENOUS at 05:30

## 2020-03-17 RX ADMIN — CLINDAMYCIN IN 5 PERCENT DEXTROSE 900 MG: 18 INJECTION, SOLUTION INTRAVENOUS at 05:27

## 2020-03-17 RX ADMIN — MEPERIDINE HYDROCHLORIDE 50 MG: 50 INJECTION INTRAMUSCULAR; INTRAVENOUS; SUBCUTANEOUS at 21:50

## 2020-03-17 RX ADMIN — MEPERIDINE HYDROCHLORIDE 50 MG: 50 INJECTION INTRAMUSCULAR; INTRAVENOUS; SUBCUTANEOUS at 09:20

## 2020-03-17 RX ADMIN — ACETAMINOPHEN 650 MG: 325 TABLET, FILM COATED ORAL at 22:51

## 2020-03-17 RX ADMIN — CLINDAMYCIN IN 5 PERCENT DEXTROSE 900 MG: 18 INJECTION, SOLUTION INTRAVENOUS at 21:26

## 2020-03-17 RX ADMIN — CALCIUM CARBONATE (ANTACID) CHEW TAB 500 MG 500 MG: 500 CHEW TAB at 21:49

## 2020-03-17 RX ADMIN — ACETAMINOPHEN 650 MG: 325 TABLET, FILM COATED ORAL at 06:11

## 2020-03-17 RX ADMIN — GENTAMICIN SULFATE 80 MG: 80 INJECTION, SOLUTION INTRAVENOUS at 22:11

## 2020-03-17 RX ADMIN — GENTAMICIN SULFATE 80 MG: 80 INJECTION, SOLUTION INTRAVENOUS at 14:01

## 2020-03-17 RX ADMIN — PROMETHAZINE HYDROCHLORIDE 25 MG: 25 INJECTION INTRAMUSCULAR; INTRAVENOUS at 01:50

## 2020-03-17 RX ADMIN — GENTAMICIN SULFATE 80 MG: 80 INJECTION, SOLUTION INTRAVENOUS at 06:11

## 2020-03-17 RX ADMIN — ACETAMINOPHEN 650 MG: 325 TABLET, FILM COATED ORAL at 18:14

## 2020-03-17 ASSESSMENT — PAIN DESCRIPTION - ONSET
ONSET: ON-GOING
ONSET: GRADUAL

## 2020-03-17 ASSESSMENT — PAIN SCALES - GENERAL
PAINLEVEL_OUTOF10: 4
PAINLEVEL_OUTOF10: 8
PAINLEVEL_OUTOF10: 3
PAINLEVEL_OUTOF10: 4
PAINLEVEL_OUTOF10: 3
PAINLEVEL_OUTOF10: 8
PAINLEVEL_OUTOF10: 5
PAINLEVEL_OUTOF10: 2
PAINLEVEL_OUTOF10: 2
PAINLEVEL_OUTOF10: 7
PAINLEVEL_OUTOF10: 3
PAINLEVEL_OUTOF10: 4

## 2020-03-17 ASSESSMENT — PAIN - FUNCTIONAL ASSESSMENT
PAIN_FUNCTIONAL_ASSESSMENT: ACTIVITIES ARE NOT PREVENTED

## 2020-03-17 ASSESSMENT — PAIN DESCRIPTION - ORIENTATION
ORIENTATION: RIGHT;LOWER
ORIENTATION: LOWER;RIGHT
ORIENTATION: RIGHT;LOWER

## 2020-03-17 ASSESSMENT — PAIN DESCRIPTION - PAIN TYPE
TYPE: ACUTE PAIN

## 2020-03-17 ASSESSMENT — PAIN DESCRIPTION - LOCATION
LOCATION: BACK;FLANK
LOCATION: BACK;FLANK
LOCATION: BACK;FLANK;HEAD
LOCATION: BACK;FLANK
LOCATION: BACK;FLANK
LOCATION: BACK;FLANK;HEAD

## 2020-03-17 ASSESSMENT — PAIN DESCRIPTION - PROGRESSION
CLINICAL_PROGRESSION: GRADUALLY IMPROVING
CLINICAL_PROGRESSION: GRADUALLY WORSENING
CLINICAL_PROGRESSION: NOT CHANGED
CLINICAL_PROGRESSION: GRADUALLY WORSENING

## 2020-03-17 ASSESSMENT — PAIN DESCRIPTION - FREQUENCY
FREQUENCY: INTERMITTENT

## 2020-03-17 NOTE — PLAN OF CARE
Problem: Pain:  Goal: Pain level will decrease  Description: Pain level will decrease  Outcome: Met This Shift     Problem: Pain:  Goal: Control of acute pain  Description: Control of acute pain  3/17/2020 1626 by Kiet Berumen RN  Outcome: Met This Shift     Problem: Pain:  Goal: Control of chronic pain  Description: Control of chronic pain  Outcome: Met This Shift

## 2020-03-18 LAB
ANION GAP SERPL CALCULATED.3IONS-SCNC: 14 MMOL/L (ref 7–16)
BUN BLDV-MCNC: 6 MG/DL (ref 6–20)
CALCIUM SERPL-MCNC: 9 MG/DL (ref 8.6–10.2)
CHLORIDE BLD-SCNC: 101 MMOL/L (ref 98–107)
CO2: 20 MMOL/L (ref 22–29)
CREAT SERPL-MCNC: 0.4 MG/DL (ref 0.5–1)
GFR AFRICAN AMERICAN: >60
GFR NON-AFRICAN AMERICAN: >60 ML/MIN/1.73
GLUCOSE BLD-MCNC: 80 MG/DL (ref 74–99)
HCT VFR BLD CALC: 30.5 % (ref 34–48)
HEMOGLOBIN: 10 G/DL (ref 11.5–15.5)
MCH RBC QN AUTO: 28.1 PG (ref 26–35)
MCHC RBC AUTO-ENTMCNC: 32.8 % (ref 32–34.5)
MCV RBC AUTO: 85.7 FL (ref 80–99.9)
PDW BLD-RTO: 13.7 FL (ref 11.5–15)
PLATELET # BLD: 180 E9/L (ref 130–450)
PMV BLD AUTO: 11.6 FL (ref 7–12)
POTASSIUM SERPL-SCNC: 3.8 MMOL/L (ref 3.5–5)
RBC # BLD: 3.56 E12/L (ref 3.5–5.5)
SODIUM BLD-SCNC: 135 MMOL/L (ref 132–146)
WBC # BLD: 10.8 E9/L (ref 4.5–11.5)

## 2020-03-18 PROCEDURE — 1220000001 HC SEMI PRIVATE L&D R&B

## 2020-03-18 PROCEDURE — 80048 BASIC METABOLIC PNL TOTAL CA: CPT

## 2020-03-18 PROCEDURE — 87088 URINE BACTERIA CULTURE: CPT

## 2020-03-18 PROCEDURE — 6370000000 HC RX 637 (ALT 250 FOR IP): Performed by: OBSTETRICS & GYNECOLOGY

## 2020-03-18 PROCEDURE — 36415 COLL VENOUS BLD VENIPUNCTURE: CPT

## 2020-03-18 PROCEDURE — 85027 COMPLETE CBC AUTOMATED: CPT

## 2020-03-18 PROCEDURE — 6360000002 HC RX W HCPCS: Performed by: OBSTETRICS & GYNECOLOGY

## 2020-03-18 PROCEDURE — 2500000003 HC RX 250 WO HCPCS: Performed by: OBSTETRICS & GYNECOLOGY

## 2020-03-18 RX ADMIN — CLINDAMYCIN IN 5 PERCENT DEXTROSE 900 MG: 18 INJECTION, SOLUTION INTRAVENOUS at 04:55

## 2020-03-18 RX ADMIN — ACETAMINOPHEN 650 MG: 325 TABLET, FILM COATED ORAL at 17:11

## 2020-03-18 RX ADMIN — CLINDAMYCIN IN 5 PERCENT DEXTROSE 900 MG: 18 INJECTION, SOLUTION INTRAVENOUS at 21:38

## 2020-03-18 RX ADMIN — PROMETHAZINE HYDROCHLORIDE 25 MG: 25 INJECTION INTRAMUSCULAR; INTRAVENOUS at 02:44

## 2020-03-18 RX ADMIN — MEPERIDINE HYDROCHLORIDE 50 MG: 50 INJECTION INTRAMUSCULAR; INTRAVENOUS; SUBCUTANEOUS at 02:46

## 2020-03-18 RX ADMIN — MEPERIDINE HYDROCHLORIDE 50 MG: 50 INJECTION INTRAMUSCULAR; INTRAVENOUS; SUBCUTANEOUS at 08:22

## 2020-03-18 RX ADMIN — MEPERIDINE HYDROCHLORIDE 50 MG: 50 INJECTION INTRAMUSCULAR; INTRAVENOUS; SUBCUTANEOUS at 18:13

## 2020-03-18 RX ADMIN — ACETAMINOPHEN 650 MG: 325 TABLET, FILM COATED ORAL at 23:09

## 2020-03-18 RX ADMIN — CLINDAMYCIN IN 5 PERCENT DEXTROSE 900 MG: 18 INJECTION, SOLUTION INTRAVENOUS at 13:48

## 2020-03-18 RX ADMIN — PROMETHAZINE HYDROCHLORIDE 25 MG: 25 INJECTION INTRAMUSCULAR; INTRAVENOUS at 18:18

## 2020-03-18 RX ADMIN — PROMETHAZINE HYDROCHLORIDE 25 MG: 25 INJECTION INTRAMUSCULAR; INTRAVENOUS at 08:48

## 2020-03-18 RX ADMIN — GENTAMICIN SULFATE 80 MG: 80 INJECTION, SOLUTION INTRAVENOUS at 15:50

## 2020-03-18 RX ADMIN — MEPERIDINE HYDROCHLORIDE 50 MG: 50 INJECTION INTRAMUSCULAR; INTRAVENOUS; SUBCUTANEOUS at 23:08

## 2020-03-18 RX ADMIN — GENTAMICIN SULFATE 80 MG: 80 INJECTION, SOLUTION INTRAVENOUS at 06:51

## 2020-03-18 RX ADMIN — MEPERIDINE HYDROCHLORIDE 50 MG: 50 INJECTION INTRAMUSCULAR; INTRAVENOUS; SUBCUTANEOUS at 13:49

## 2020-03-18 RX ADMIN — ACETAMINOPHEN 650 MG: 325 TABLET, FILM COATED ORAL at 12:24

## 2020-03-18 ASSESSMENT — PAIN DESCRIPTION - DESCRIPTORS
DESCRIPTORS: ACHING;SORE
DESCRIPTORS: ACHING;CRAMPING

## 2020-03-18 ASSESSMENT — PAIN DESCRIPTION - FREQUENCY: FREQUENCY: CONTINUOUS

## 2020-03-18 ASSESSMENT — PAIN SCALES - GENERAL
PAINLEVEL_OUTOF10: 6
PAINLEVEL_OUTOF10: 7
PAINLEVEL_OUTOF10: 4
PAINLEVEL_OUTOF10: 7
PAINLEVEL_OUTOF10: 8
PAINLEVEL_OUTOF10: 8
PAINLEVEL_OUTOF10: 7

## 2020-03-18 ASSESSMENT — PAIN DESCRIPTION - PROGRESSION: CLINICAL_PROGRESSION: GRADUALLY IMPROVING

## 2020-03-18 ASSESSMENT — PAIN DESCRIPTION - ORIENTATION: ORIENTATION: LOWER;MID

## 2020-03-18 ASSESSMENT — PAIN DESCRIPTION - LOCATION: LOCATION: ABDOMEN

## 2020-03-18 ASSESSMENT — PAIN - FUNCTIONAL ASSESSMENT: PAIN_FUNCTIONAL_ASSESSMENT: ACTIVITIES ARE NOT PREVENTED

## 2020-03-18 ASSESSMENT — PAIN DESCRIPTION - PAIN TYPE: TYPE: ACUTE PAIN

## 2020-03-18 NOTE — CONSULTS
3/18/2020 8:19 AM  Service: Urology  Group: [facility] ([facility])    Brandon Orr  [id number]     Chief Complaint:    Right Kidney Stone     History of Present Illness: The patient is a 37 y.o. female patient who is 28 weeks pregnant and was admitted to labor and delivery department on 3-15-20 with complaints of right flank pain. We were asked to see on 3-17-20 for regarding right kidney stone. She had been seen on 3-11-20 on labor and delivery as well for for complaints of contractions and lower back pain. UA was + for UTI at that time. She denies that she was on any antibiotics. Renal Ultrasound was performed at that time that showed nonobstructing right renal stone and minimal left hydronephrosis. She has never seen a urologist in the past, she states she has been told two years ago she had kidney stones but never required intervention for them. She currently complaints of right flank pain, denies gross hematuria, or fever or chills currently. Past Medical History:   Diagnosis Date    28 weeks gestation of pregnancy 3/12/2020    Abnormal Pap smear of cervix     Chronic kidney disease     Knee effusion     Mental disorder     Postpartum depression          Past Surgical History:   Procedure Laterality Date    DILATION AND CURETTAGE OF UTERUS N/A 7/6/2019    DILATATION AND CURETTAGE SUCTION performed by Daisha Cartagena MD at [address]       Medications Prior to Admission:    Medications Prior to Admission: doxylamine-pyridoxine 10-10 MG TBEC, Take 2 tablets po qhs  Prenatal MV-Min-Fe Fum-FA-DHA (PRENATAL 1 PO), Take by mouth    Allergies:    Penicillins and Vicodin [hydrocodone-acetaminophen]    Social History:    reports that she has been smoking cigarettes. She has a 7.50 pack-year smoking history. She uses smokeless tobacco. She reports previous alcohol use. She reports that she does not use drugs.     Family History:   Non-contributory to this Urological problem  family history is not on file. Review of Systems:  Constitutional: No fever or chills   Respiratory: negative for cough and hemoptysis  Cardiovascular: negative for chest pain and dyspnea  Gastrointestinal: negative for abdominal pain, diarrhea, nausea and vomiting   Derm: negative for rash and skin lesion(s)  Neurological: negative for seizures and tremors  Musculoskeletal: Negative    Psychiatric: Negative   : As above in the HPI, otherwise negative  All other reviews are negative    Physical Exam:     Vitals:  BP (!) 111/52   Pulse 93   Temp 97.7 °F (36.5 °C) (Oral)   Resp 17   LMP 09/10/2019   SpO2 99%     General:  Awake, alert, oriented X 3. No apparent distress. HEENT:  Normocephalic, atraumatic. Lungs:  Respirations symmetric and non-labored. Abdomen:  soft, nontender, no masses  Extremities:  No clubbing, cyanosis, or edema  Skin:  Warm and dry, no open lesions or rashes  Neuro: There are no motor or sensory deficits in the 4 quadrant extremities   Rectal: deferred  Genitourinary:  No stoner     Labs:     Recent Labs     03/15/20  1130   WBC 15.6*   RBC 3.70   HGB 10.6*   HCT 32.3*   MCV 87.3   MCH 28.6   MCHC 32.8   RDW 13.5      MPV 11.7         No results for input(s): CREATININE in the last 72 hours. Imaging:   Narrative   LOCATION: 200       EXAM: US RETROPERITONEAL COMPLETE       COMPARISON: None       HISTORY:  r/o stone    r/o stone       Technique: Real-time, gray scale, color flow of the kidneys and   bladder was obtained.        Findings:   Right kidney: The right kidney measures 13 point cm. Cortical   thickness and echogenicity is within normal limits bilaterally. 5 mm   nonobstructing stone lower pole right kidney.       Left Kidney: The left kidney measures 12.5 cm. Cortical thickness and   echogenicity is within normal limits bilaterally. Minimal left   hydronephrosis. .       A fetus is identified lower pelvis.       IMPRESSION   Minimal left hydronephrosis.  Nonobstructive right nephrolithiasis         Assessment/plan:  Nonobstructing right renal caculi   Mild left Hydronephrosis   UTI     Renal ultrasound reviewed, right renal calculi was nonobstructing at that time of exam. Will order BMP to rule out any azotemia   Urinalysis reviewed, positive UTI, She has not had urine culture done to date and has already been on antibiotics. Will send culture out  BMP ordered to rule out any azotemia  CBC ordered to rule out any worsening leukocytosis   Will keep NPO pending above results for now  Suspect this is right pyelonephritis  Hold off on any stenting at this time unless worsening Azotemia or becomes hemodynamically unstable. Recommend IV Rocephin or Cefepime, will leave antibiotic management to OBGYN recommendations   We will cont to follow her  Please call with any further questions or concerns               Electronically signed by TREVOR Putnam CNP on 3/18/2020 at 8:19 AM   I interviewed and examined the patient. She apparently had a stone several years ago I do not think she required instrumentation. The patient was sleeping comfortably when I entered the room. The assessment at this time is that this is not an obstructing calculus. We chose her Rocephin as this was very compatible with her pregnancy  We will continue to watch her but there are no plans for stent or intervention at this time.   At a later time metabolic evaluation can be done

## 2020-03-18 NOTE — PLAN OF CARE
Problem: Pain:  Goal: Pain level will decrease  Description: Pain level will decrease  3/17/2020 1626 by Nelli Singh RN  Outcome: Met This Shift  Goal: Control of acute pain  Description: Control of acute pain  3/18/2020 0310 by Miguelangel Arambula RN  Outcome: Met This Shift  3/17/2020 1626 by Nelli Singh RN  Outcome: Met This Shift  Goal: Control of chronic pain  Description: Control of chronic pain  3/17/2020 1626 by Nelli Singh RN  Outcome: Met This Shift

## 2020-03-19 PROCEDURE — 2500000003 HC RX 250 WO HCPCS: Performed by: OBSTETRICS & GYNECOLOGY

## 2020-03-19 PROCEDURE — 2580000003 HC RX 258: Performed by: OBSTETRICS & GYNECOLOGY

## 2020-03-19 PROCEDURE — 6360000002 HC RX W HCPCS: Performed by: OBSTETRICS & GYNECOLOGY

## 2020-03-19 PROCEDURE — 6370000000 HC RX 637 (ALT 250 FOR IP): Performed by: OBSTETRICS & GYNECOLOGY

## 2020-03-19 PROCEDURE — 1220000001 HC SEMI PRIVATE L&D R&B

## 2020-03-19 RX ADMIN — SODIUM CHLORIDE, POTASSIUM CHLORIDE, SODIUM LACTATE AND CALCIUM CHLORIDE: 600; 310; 30; 20 INJECTION, SOLUTION INTRAVENOUS at 23:40

## 2020-03-19 RX ADMIN — ACETAMINOPHEN 650 MG: 325 TABLET, FILM COATED ORAL at 08:50

## 2020-03-19 RX ADMIN — GENTAMICIN SULFATE 80 MG: 80 INJECTION, SOLUTION INTRAVENOUS at 00:00

## 2020-03-19 RX ADMIN — GENTAMICIN SULFATE 80 MG: 80 INJECTION, SOLUTION INTRAVENOUS at 15:51

## 2020-03-19 RX ADMIN — PROMETHAZINE HYDROCHLORIDE 25 MG: 25 INJECTION INTRAMUSCULAR; INTRAVENOUS at 00:27

## 2020-03-19 RX ADMIN — SODIUM CHLORIDE, POTASSIUM CHLORIDE, SODIUM LACTATE AND CALCIUM CHLORIDE: 600; 310; 30; 20 INJECTION, SOLUTION INTRAVENOUS at 11:20

## 2020-03-19 RX ADMIN — PROMETHAZINE HYDROCHLORIDE 25 MG: 25 INJECTION INTRAMUSCULAR; INTRAVENOUS at 06:25

## 2020-03-19 RX ADMIN — MEPERIDINE HYDROCHLORIDE 50 MG: 50 INJECTION INTRAMUSCULAR; INTRAVENOUS; SUBCUTANEOUS at 18:53

## 2020-03-19 RX ADMIN — CLINDAMYCIN IN 5 PERCENT DEXTROSE 900 MG: 18 INJECTION, SOLUTION INTRAVENOUS at 20:32

## 2020-03-19 RX ADMIN — SODIUM CHLORIDE, POTASSIUM CHLORIDE, SODIUM LACTATE AND CALCIUM CHLORIDE: 600; 310; 30; 20 INJECTION, SOLUTION INTRAVENOUS at 00:28

## 2020-03-19 RX ADMIN — MEPERIDINE HYDROCHLORIDE 50 MG: 50 INJECTION INTRAMUSCULAR; INTRAVENOUS; SUBCUTANEOUS at 06:25

## 2020-03-19 RX ADMIN — CLINDAMYCIN IN 5 PERCENT DEXTROSE 900 MG: 18 INJECTION, SOLUTION INTRAVENOUS at 05:45

## 2020-03-19 RX ADMIN — ACETAMINOPHEN 650 MG: 325 TABLET, FILM COATED ORAL at 15:40

## 2020-03-19 RX ADMIN — ACETAMINOPHEN 650 MG: 325 TABLET, FILM COATED ORAL at 23:48

## 2020-03-19 RX ADMIN — CLINDAMYCIN IN 5 PERCENT DEXTROSE 900 MG: 18 INJECTION, SOLUTION INTRAVENOUS at 12:49

## 2020-03-19 RX ADMIN — PROMETHAZINE HYDROCHLORIDE 25 MG: 25 INJECTION INTRAMUSCULAR; INTRAVENOUS at 12:44

## 2020-03-19 RX ADMIN — GENTAMICIN SULFATE 80 MG: 80 INJECTION, SOLUTION INTRAVENOUS at 07:55

## 2020-03-19 RX ADMIN — MEPERIDINE HYDROCHLORIDE 50 MG: 50 INJECTION INTRAMUSCULAR; INTRAVENOUS; SUBCUTANEOUS at 12:49

## 2020-03-19 RX ADMIN — PROMETHAZINE HYDROCHLORIDE 25 MG: 25 INJECTION INTRAMUSCULAR; INTRAVENOUS at 18:54

## 2020-03-19 RX ADMIN — GENTAMICIN SULFATE 80 MG: 80 INJECTION, SOLUTION INTRAVENOUS at 23:40

## 2020-03-19 ASSESSMENT — PAIN - FUNCTIONAL ASSESSMENT
PAIN_FUNCTIONAL_ASSESSMENT: 0-10
PAIN_FUNCTIONAL_ASSESSMENT: 0-10
PAIN_FUNCTIONAL_ASSESSMENT: ACTIVITIES ARE NOT PREVENTED

## 2020-03-19 ASSESSMENT — PAIN SCALES - GENERAL
PAINLEVEL_OUTOF10: 6
PAINLEVEL_OUTOF10: 9
PAINLEVEL_OUTOF10: 8
PAINLEVEL_OUTOF10: 8
PAINLEVEL_OUTOF10: 7
PAINLEVEL_OUTOF10: 6

## 2020-03-19 ASSESSMENT — PAIN DESCRIPTION - DESCRIPTORS
DESCRIPTORS: SHARP
DESCRIPTORS: SHARP

## 2020-03-20 VITALS
BODY MASS INDEX: 33.75 KG/M2 | OXYGEN SATURATION: 99 % | HEART RATE: 93 BPM | HEIGHT: 66 IN | DIASTOLIC BLOOD PRESSURE: 62 MMHG | WEIGHT: 210 LBS | SYSTOLIC BLOOD PRESSURE: 122 MMHG | TEMPERATURE: 97.9 F | RESPIRATION RATE: 16 BRPM

## 2020-03-20 PROBLEM — O23.42 URINARY TRACT INFECTION IN MOTHER DURING SECOND TRIMESTER OF PREGNANCY: Status: ACTIVE | Noted: 2020-03-20

## 2020-03-20 PROBLEM — N12 PYELONEPHRITIS OF RIGHT KIDNEY: Status: ACTIVE | Noted: 2020-03-20

## 2020-03-20 PROBLEM — O26.91 COMPLICATED PREGNANCY, FIRST TRIMESTER: Status: RESOLVED | Noted: 2019-07-05 | Resolved: 2020-03-20

## 2020-03-20 PROBLEM — Z34.93 NORMAL PREGNANCY IN THIRD TRIMESTER: Status: RESOLVED | Noted: 2020-03-15 | Resolved: 2020-03-20

## 2020-03-20 PROBLEM — O26.92 COMPLICATION OF PREGNANCY, SECOND TRIMESTER: Status: RESOLVED | Noted: 2020-03-17 | Resolved: 2020-03-20

## 2020-03-20 LAB — URINE CULTURE, ROUTINE: NORMAL

## 2020-03-20 PROCEDURE — 6360000002 HC RX W HCPCS: Performed by: OBSTETRICS & GYNECOLOGY

## 2020-03-20 PROCEDURE — 96366 THER/PROPH/DIAG IV INF ADDON: CPT

## 2020-03-20 PROCEDURE — 2500000003 HC RX 250 WO HCPCS: Performed by: OBSTETRICS & GYNECOLOGY

## 2020-03-20 PROCEDURE — 6370000000 HC RX 637 (ALT 250 FOR IP): Performed by: OBSTETRICS & GYNECOLOGY

## 2020-03-20 PROCEDURE — 96361 HYDRATE IV INFUSION ADD-ON: CPT

## 2020-03-20 RX ORDER — NITROFURANTOIN 25; 75 MG/1; MG/1
100 CAPSULE ORAL 2 TIMES DAILY
Qty: 28 CAPSULE | Refills: 0 | Status: SHIPPED | OUTPATIENT
Start: 2020-03-20 | End: 2020-04-03

## 2020-03-20 RX ADMIN — PROMETHAZINE HYDROCHLORIDE 25 MG: 25 INJECTION INTRAMUSCULAR; INTRAVENOUS at 06:24

## 2020-03-20 RX ADMIN — ACETAMINOPHEN 650 MG: 325 TABLET, FILM COATED ORAL at 10:54

## 2020-03-20 RX ADMIN — MEPERIDINE HYDROCHLORIDE 50 MG: 50 INJECTION INTRAMUSCULAR; INTRAVENOUS; SUBCUTANEOUS at 06:25

## 2020-03-20 RX ADMIN — CLINDAMYCIN IN 5 PERCENT DEXTROSE 900 MG: 18 INJECTION, SOLUTION INTRAVENOUS at 04:54

## 2020-03-20 ASSESSMENT — PAIN SCALES - GENERAL
PAINLEVEL_OUTOF10: 6
PAINLEVEL_OUTOF10: 7

## 2020-03-20 ASSESSMENT — PAIN DESCRIPTION - DESCRIPTORS: DESCRIPTORS: THROBBING

## 2020-03-20 ASSESSMENT — PAIN - FUNCTIONAL ASSESSMENT
PAIN_FUNCTIONAL_ASSESSMENT: 0-10
PAIN_FUNCTIONAL_ASSESSMENT: 0-10

## 2020-03-20 NOTE — DISCHARGE SUMMARY
Obstetric Discharge [facility]    Patient Name:    Uriel Tamayo Montgomery Record Number:   [id number]    Attending:     Samuel Cruz    Date of Admission:    3/15/2020    Date of Discharge:    3/20/2020    Admitting Diagnosis  79-year-old female at 34 weeks with severe right flank pain and history of right kidney stone  OB History        [address] [place]    Para   5    Term   5            AB   7    Living   5       SAB   7    TAB        Ectopic        Molar        Multiple        Live Births   5                Reasons for Admission on 3/15/2020 10:45 AM  Normal pregnancy in third trimester [L20.91]  Complication of pregnancy, second trimester [O26.92]      Observation/Evaluation (Medical Complications): Right kidney stone with possible right pyelonephritis      Discharge Diagnosis   Right pyelonephritis  Right kidney stone  29 weeks pregnancy        Discharge Meds:       Medication List      START taking these medications    nitrofurantoin (macrocrystal-monohydrate) 100 MG capsule  Commonly known as:  MACROBID  Take 1 capsule by mouth 2 times daily for 14 days        CONTINUE taking these medications    doxylamine-pyridoxine 10-10 MG Tbec  Take 2 tablets po qhs     PRENATAL 1 PO           Where to Get Your Medications      You can get these medications from any pharmacy    Bring a paper prescription for each of these medications  · nitrofurantoin (macrocrystal-monohydrate) 100 MG capsule         Discharge Information  Current Discharge Medication List      START taking these medications    Details   nitrofurantoin, macrocrystal-monohydrate, (MACROBID) 100 MG capsule Take 1 capsule by mouth 2 times daily for 14 days  Qty: 28 capsule, Refills: 0         CONTINUE these medications which have NOT CHANGED    Details   Prenatal MV-Min-Fe Fum-FA-DHA (PRENATAL 1 PO) Take by mouth      doxylamine-pyridoxine 10-10 MG TBEC Take 2 tablets po qhs  Qty: 15 tablet, Refills: 0               Discharge condition:

## 2020-03-20 NOTE — PROGRESS NOTES
3/19/2020 8:28 AM  Service: Urology  Group: [facility] ([facility]    Reny Russo  [id number]    Subjective:    She is sleeping comfortably   Arouses to verbal stimuli  She feels improvement of right flank pain   Denies fever or chills   Denies any difficulty urinating  No family is present     Review of Systems  Constitutional: No fever or chills   Respiratory: negative for cough and hemoptysis  Cardiovascular: negative for chest pain and dyspnea  Gastrointestinal: negative for abdominal pain, diarrhea, nausea and vomiting   : See above  Derm: negative for rash and skin lesion(s)  Neurological: negative for seizures and tremors  Musculoskeletal: Negative    Psychiatric: Negative   All other reviews are negative      Scheduled Meds:   sodium phosphate  1 enema Rectal Once    lactated ringers bolus  500 mL Intravenous Once    clindamycin (CLEOCIN) IV  900 mg Intravenous Q8H    gentamicin  80 mg Intravenous Q8H       Objective:  Vitals:    03/19/20 0728   BP: (!) 100/50   Pulse: 93   Resp: 17   Temp: 98.2 °F (36.8 °C)   SpO2:          Allergies: Penicillins and Vicodin [hydrocodone-acetaminophen]    General Appearance: alert and oriented to person, place and time and in no acute distress  Skin: no rash or erythema  Head: normocephalic and atraumatic  Pulmonary/Chest: normal air movement, no respiratory distress  Abdomen: soft, non-tender, non-distended  Genitourinary: no stoner   Extremities: no cyanosis, clubbing or edema         Labs:     Recent Labs     03/18/20  1000      K 3.8      CO2 20*   BUN 6   CREATININE 0.4*   GLUCOSE 80   CALCIUM 9.0       Lab Results   Component Value Date    HGB 10.0 03/18/2020    HCT 30.5 03/18/2020         Assessment/Plan:  Nonobstructing right renal caculi   Mild left Hydronephrosis   UTI   Right pyelonephritis       Creatinine stable   No leukocytosis   Urine culture pending   Will leave antibiotic management to OBGYN, we recommend Rocephin  No plans for
Assumed care of pt at this time. Plan of care discussed. Pt verbalized understanding. No other concerns expressed at this time. Call light within reach.
Assumed care of pt at this time. Plan of care discussed. Pt verbalized understanding. No other concerns expressed at this time. Call light within reach.
Assumed care of pt at this time. Report given from previous RN. POC for the night discussed with pt pt verbalizes understanding. Pt NPO after midnight and to see urology in the morning. Pt perceives positive fetal movement, denies leakage of fluid, or vaginal bleeding. Comfort needs met, call light within reach.
Assumed care of pt for 11-7 shift. First contact with pt. Plan of care for night discussed with pt. Pt verbalizes understanding. Positive fetal movement perceived by pt and audible per RN with FHT spot check. Pt requesting not to have EFM on at this time. Rest encouraged.
Assumed care of pt for 11-7 shift. First contact with pt. Plan of care for night discussed. Pt verbalizes understanding. Positive fetal movement perceived by pt and audible per RN. EFM removed per pt request. Medicated for complaints of nausea. Rest encouraged.
Assumed care of pt. Assessment and vitals completed. Pt with no needs at this time. POC discussed. Call light in reach.
Assumed pt care. No complaints at this time. Plan of care discussed with pt. Call light within reach and pt encouraged to call with any questions, needs, or concerns.
Assumed pt care. Plan of care for the night discussed with patient. Pt uncomfortable, flank pain, rating pain 8/10. Ice water provided, call light within reach and pt encouraged to call with any questions, needs, or concerns.
Continued care of pt. Plan of care for the night discussed with pt. All questions answered. Call light within reach and pt encouraged to call with any questions, needs, or concerns.
Doing well this morning with no complaints. Feeling much better compared to yesterday. Blood pressure 122/62, pulse 93, temperature 97.9 °F (36.6 °C), temperature source Oral, resp. rate 16, height 5' 6\" (1.676 m), weight 210 lb (95.3 kg), last menstrual period 09/10/2019, SpO2 99 %, unknown if currently breastfeeding.      Physical exam  No CVA tenderness    Assessment  Right pyelonephritis, resolving  Right nonobstructing kidney stone    Plan  We will discharge home on Macrobid
Dr Cordelia Hogue notified of pt labs.  Orders received
Dr Johana Araujo notified of pt arrival. Orders received.
Dr Ranjit Gutierrez called for pt update. Bethany Alanis for general diet.
Dr. Kieran Curry on unit and reviewed fetal tracing and maternal VS. Orders received
Dr. Lars Araujo at bedside. Orders received.
Dr. Larsen Cowper in to see and assess patient. Discharge order received, EFM acceptable and may discontinue at this time. Dr. Des Patel paged by his office to update.
Medicated for complaints of headache.
Medicated for complaints of right flank pain. Reposition encouraged.
[id number] 28 3/7 week pt of dr Hernan Lazo arrived via squad to unit complaining of Right flank pain due to a kidney stone. Pt denies any bleeding or leaking of fluid or contractions. Pt stated she perceives fetal movement. Pt stated she has been straining her urine and has not passed the stone yet. Oriented to triage 3. Call light in reach.
No complaints of discomfort. FHTs 140 baseline with positive fetal movement perceived by pt. Rest encouraged.
Notified Dr. Kanchan Patel that IV phenergan is burning her arm, okay to switch to IM.
Patient aroused for vitals and assessment. Plan of care discussed. Requesting pain med. Patient informed of ordered fleets enema. States will take later. Continues to strain urine.   Monitors applied for intermittent monitoring
Patient sleeping at this time
Patient sleeping undisturbed, took general diet.
Pt awoken for bathroom. Asking for pain medication for back pain. Positive fetal movement perceived by pt. FHTs spot check at 140. Pt refusing EFM at this time.
Spoke with Dr. Arabella Abraham, he will see patient tomorrow. Orders received.
Spoke with answering servicie for Dr. Loki Hill group to notify of consult.
Starting to feel better, not requesting as much pain medicine    Blood pressure 122/62, pulse 93, temperature 97.9 °F (36.6 °C), temperature source Oral, resp. rate 16, height 5' 6\" (1.676 m), weight 210 lb (95.3 kg), last menstrual period 09/10/2019, SpO2 99 %, unknown if currently breastfeeding.      Physical exam  Mild right CVA tenderness    Assessment  Right pyelonephritis with right kidney stone    Plan  Will need to continue with IV antibiotics for possibly 24 more hours
Still complaining of severe right flank pain. Urine is being strained and no stones has been recovered. Blood pressure 133/60, pulse 92, temperature 98.5 °F (36.9 °C), temperature source Oral, resp. rate 18, last menstrual period 09/10/2019, SpO2 100 %, unknown if currently breastfeeding. Physical exam  Severe right CVA tenderness    Assessment  Right kidney stone    Plan  Continue with IV fluids and IV antibiotics. We will consult urology for a possibility of stent placement.
Urine strained and negative for calculus
Urine strained, no calculus
Urine strained, no calculus present.
Urine strained, no calculus present.
with any further questions or concerns or any worsening signs or symptoms such as fever, chills, nausea, or emesis.     No further  interventions planned at this time  Thank you for allowing us to participate in her care     Yonis Macario, [address] [place] [address]  Urology

## 2020-04-20 ENCOUNTER — HOSPITAL ENCOUNTER (OUTPATIENT)
Age: 35
Discharge: HOME | End: 2020-04-20
Payer: COMMERCIAL

## 2020-04-20 LAB
BASOPHILS ABSOLUTE: 0.03 E9/L (ref 0–0.2)
BASOPHILS RELATIVE PERCENT: 0.3 % (ref 0–2)
EOSINOPHILS ABSOLUTE: 0.14 E9/L (ref 0.05–0.5)
EOSINOPHILS RELATIVE PERCENT: 1.2 % (ref 0–6)
GLUCOSE TOLERANCE SCREEN 50G: 162 MG/DL (ref 70–140)
HCT VFR BLD CALC: 32.8 % (ref 34–48)
HEMOGLOBIN: 10.6 G/DL (ref 11.5–15.5)
IMMATURE GRANULOCYTES #: 0.22 E9/L
IMMATURE GRANULOCYTES %: 2 % (ref 0–5)
LYMPHOCYTES ABSOLUTE: 1.58 E9/L (ref 1.5–4)
LYMPHOCYTES RELATIVE PERCENT: 14.1 % (ref 20–42)
MCH RBC QN AUTO: 28.6 PG (ref 26–35)
MCHC RBC AUTO-ENTMCNC: 32.3 % (ref 32–34.5)
MCV RBC AUTO: 88.6 FL (ref 80–99.9)
MONOCYTES ABSOLUTE: 0.62 E9/L (ref 0.1–0.95)
MONOCYTES RELATIVE PERCENT: 5.5 % (ref 2–12)
NEUTROPHILS ABSOLUTE: 8.65 E9/L (ref 1.8–7.3)
NEUTROPHILS RELATIVE PERCENT: 76.9 % (ref 43–80)
PDW BLD-RTO: 14.4 FL (ref 11.5–15)
PLATELET # BLD: 168 E9/L (ref 130–450)
PMV BLD AUTO: 10.7 FL (ref 7–12)
RBC # BLD: 3.7 E12/L (ref 3.5–5.5)
WBC # BLD: 11.2 E9/L (ref 4.5–11.5)

## 2020-04-20 PROCEDURE — 82950 GLUCOSE TEST: CPT

## 2020-04-20 PROCEDURE — 36415 COLL VENOUS BLD VENIPUNCTURE: CPT

## 2020-04-20 PROCEDURE — 85025 COMPLETE CBC W/AUTO DIFF WBC: CPT

## 2020-04-27 ENCOUNTER — HOSPITAL ENCOUNTER (OUTPATIENT)
Age: 35
Discharge: HOME | End: 2020-04-27
Payer: COMMERCIAL

## 2020-04-27 LAB
GLUCOSE FASTING: 83 MG/ML
GLUCOSE TOLERANCE TEST 1 HOUR: 127 MG/DL
GLUCOSE TOLERANCE TEST 2 HOUR: 142 MG/DL
GLUCOSE TOLERANCE TEST 3 HOUR: 89 MG/DL

## 2020-04-27 PROCEDURE — 82951 GLUCOSE TOLERANCE TEST (GTT): CPT

## 2020-04-27 PROCEDURE — 36415 COLL VENOUS BLD VENIPUNCTURE: CPT

## 2020-04-27 PROCEDURE — 82952 GTT-ADDED SAMPLES: CPT

## 2020-04-29 ENCOUNTER — HOSPITAL ENCOUNTER (OUTPATIENT)
Age: 35
Setting detail: OBSERVATION
Discharge: HOME | End: 2020-04-30
Attending: OBSTETRICS & GYNECOLOGY | Admitting: OBSTETRICS & GYNECOLOGY
Payer: COMMERCIAL

## 2020-04-29 PROBLEM — O26.90 PREGNANCY RELATED CONDITION: Status: ACTIVE | Noted: 2020-04-29

## 2020-04-29 LAB
ALBUMIN SERPL-MCNC: 3.5 G/DL (ref 3.5–5.2)
ALP BLD-CCNC: 90 U/L (ref 35–104)
ALT SERPL-CCNC: 7 U/L (ref 0–32)
AMORPHOUS: ABNORMAL
ANION GAP SERPL CALCULATED.3IONS-SCNC: 12 MMOL/L (ref 7–16)
AST SERPL-CCNC: 19 U/L (ref 0–31)
BACTERIA: ABNORMAL /HPF
BILIRUB SERPL-MCNC: <0.2 MG/DL (ref 0–1.2)
BILIRUBIN URINE: NEGATIVE
BLOOD, URINE: NEGATIVE
BUN BLDV-MCNC: 9 MG/DL (ref 6–20)
CALCIUM SERPL-MCNC: 8.9 MG/DL (ref 8.6–10.2)
CHLORIDE BLD-SCNC: 106 MMOL/L (ref 98–107)
CLARITY: CLEAR
CO2: 19 MMOL/L (ref 22–29)
COLOR: YELLOW
CREAT SERPL-MCNC: 0.5 MG/DL (ref 0.5–1)
EPITHELIAL CELLS, UA: ABNORMAL /HPF
GFR AFRICAN AMERICAN: >60
GFR NON-AFRICAN AMERICAN: >60 ML/MIN/1.73
GLUCOSE BLD-MCNC: 99 MG/DL (ref 74–99)
GLUCOSE URINE: NEGATIVE MG/DL
HCT VFR BLD CALC: 32.7 % (ref 34–48)
HEMOGLOBIN: 10.5 G/DL (ref 11.5–15.5)
KETONES, URINE: NEGATIVE MG/DL
LEUKOCYTE ESTERASE, URINE: ABNORMAL
MCH RBC QN AUTO: 27.9 PG (ref 26–35)
MCHC RBC AUTO-ENTMCNC: 32.1 % (ref 32–34.5)
MCV RBC AUTO: 87 FL (ref 80–99.9)
NITRITE, URINE: NEGATIVE
PDW BLD-RTO: 14.1 FL (ref 11.5–15)
PH UA: 7 (ref 5–9)
PLATELET # BLD: 221 E9/L (ref 130–450)
PMV BLD AUTO: 11 FL (ref 7–12)
POTASSIUM SERPL-SCNC: 4.7 MMOL/L (ref 3.5–5)
PROTEIN UA: NEGATIVE MG/DL
RBC # BLD: 3.76 E12/L (ref 3.5–5.5)
RBC UA: ABNORMAL /HPF (ref 0–2)
SODIUM BLD-SCNC: 137 MMOL/L (ref 132–146)
SPECIFIC GRAVITY UA: 1.02 (ref 1–1.03)
TOTAL PROTEIN: 6.6 G/DL (ref 6.4–8.3)
UROBILINOGEN, URINE: 0.2 E.U./DL
WBC # BLD: 13.6 E9/L (ref 4.5–11.5)
WBC UA: ABNORMAL /HPF (ref 0–5)

## 2020-04-29 PROCEDURE — G0378 HOSPITAL OBSERVATION PER HR: HCPCS

## 2020-04-29 PROCEDURE — 80053 COMPREHEN METABOLIC PANEL: CPT

## 2020-04-29 PROCEDURE — 6360000002 HC RX W HCPCS: Performed by: OBSTETRICS & GYNECOLOGY

## 2020-04-29 PROCEDURE — 96372 THER/PROPH/DIAG INJ SC/IM: CPT

## 2020-04-29 PROCEDURE — 96360 HYDRATION IV INFUSION INIT: CPT

## 2020-04-29 PROCEDURE — 81001 URINALYSIS AUTO W/SCOPE: CPT

## 2020-04-29 PROCEDURE — 6360000002 HC RX W HCPCS

## 2020-04-29 PROCEDURE — 96361 HYDRATE IV INFUSION ADD-ON: CPT

## 2020-04-29 PROCEDURE — G0379 DIRECT REFER HOSPITAL OBSERV: HCPCS

## 2020-04-29 PROCEDURE — 85027 COMPLETE CBC AUTOMATED: CPT

## 2020-04-29 PROCEDURE — 36415 COLL VENOUS BLD VENIPUNCTURE: CPT

## 2020-04-29 RX ORDER — TERBUTALINE SULFATE 1 MG/ML
INJECTION, SOLUTION SUBCUTANEOUS
Status: COMPLETED
Start: 2020-04-29 | End: 2020-04-29

## 2020-04-29 RX ORDER — TERBUTALINE SULFATE 1 MG/ML
0.25 INJECTION, SOLUTION SUBCUTANEOUS ONCE
Status: COMPLETED | OUTPATIENT
Start: 2020-04-29 | End: 2020-04-29

## 2020-04-29 RX ORDER — SODIUM CHLORIDE, SODIUM LACTATE, POTASSIUM CHLORIDE, AND CALCIUM CHLORIDE .6; .31; .03; .02 G/100ML; G/100ML; G/100ML; G/100ML
500 INJECTION, SOLUTION INTRAVENOUS ONCE
Status: DISCONTINUED | OUTPATIENT
Start: 2020-04-29 | End: 2020-04-30 | Stop reason: HOSPADM

## 2020-04-29 RX ORDER — SODIUM CHLORIDE, SODIUM LACTATE, POTASSIUM CHLORIDE, CALCIUM CHLORIDE 600; 310; 30; 20 MG/100ML; MG/100ML; MG/100ML; MG/100ML
INJECTION, SOLUTION INTRAVENOUS CONTINUOUS
Status: DISCONTINUED | OUTPATIENT
Start: 2020-04-29 | End: 2020-04-30 | Stop reason: HOSPADM

## 2020-04-29 RX ADMIN — TERBUTALINE SULFATE 0.25 MG: 1 INJECTION, SOLUTION SUBCUTANEOUS at 22:01

## 2020-04-29 RX ADMIN — TERBUTALINE SULFATE 0.25 MG: 1 INJECTION, SOLUTION SUBCUTANEOUS at 21:31

## 2020-04-30 VITALS
RESPIRATION RATE: 14 BRPM | TEMPERATURE: 98.2 F | HEART RATE: 92 BPM | SYSTOLIC BLOOD PRESSURE: 117 MMHG | DIASTOLIC BLOOD PRESSURE: 55 MMHG

## 2020-04-30 PROCEDURE — 96365 THER/PROPH/DIAG IV INF INIT: CPT

## 2020-04-30 PROCEDURE — 96361 HYDRATE IV INFUSION ADD-ON: CPT

## 2020-04-30 PROCEDURE — 96367 TX/PROPH/DG ADDL SEQ IV INF: CPT

## 2020-04-30 PROCEDURE — 2580000003 HC RX 258: Performed by: OBSTETRICS & GYNECOLOGY

## 2020-04-30 PROCEDURE — 96360 HYDRATION IV INFUSION INIT: CPT

## 2020-04-30 PROCEDURE — G0378 HOSPITAL OBSERVATION PER HR: HCPCS

## 2020-04-30 PROCEDURE — 6370000000 HC RX 637 (ALT 250 FOR IP): Performed by: OBSTETRICS & GYNECOLOGY

## 2020-04-30 RX ORDER — ACETAMINOPHEN 325 MG/1
650 TABLET ORAL EVERY 4 HOURS PRN
Status: DISCONTINUED | OUTPATIENT
Start: 2020-04-30 | End: 2020-04-30 | Stop reason: HOSPADM

## 2020-04-30 RX ADMIN — ACETAMINOPHEN 650 MG: 325 TABLET, FILM COATED ORAL at 06:32

## 2020-04-30 RX ADMIN — SODIUM CHLORIDE, POTASSIUM CHLORIDE, SODIUM LACTATE AND CALCIUM CHLORIDE: 600; 310; 30; 20 INJECTION, SOLUTION INTRAVENOUS at 01:33

## 2020-04-30 RX ADMIN — ACETAMINOPHEN 650 MG: 325 TABLET, FILM COATED ORAL at 01:44

## 2020-04-30 ASSESSMENT — PAIN SCALES - GENERAL
PAINLEVEL_OUTOF10: 4
PAINLEVEL_OUTOF10: 4

## 2020-04-30 NOTE — H&P
Subjective:      Krystal Gutiérrez is an 37 y.o. female at 28 and 1/7 weeks gestation presenting complaining of contractions every a few minutes lasting 30 seconds. Other associated symptoms include low back pain. Fetal Movement: normal.  Past Medical History:   Diagnosis Date    28 weeks gestation of pregnancy 3/12/2020    Abnormal Pap smear of cervix     Chronic kidney disease     Knee effusion     Mental disorder     Postpartum depression     Pyelonephritis of right kidney 3/20/2020    Urinary tract infection in mother during second trimester of pregnancy 3/20/2020       Review of Systems  Pertinent items are noted in HPI. Objective:      /68   Pulse 94   Resp 16   LMP 09/18/2019   Blood pressure 130/68, pulse 94, resp. rate 16, last menstrual period 09/18/2019, unknown if currently breastfeeding. General:   alert, appears stated age and cooperative   Cervix:   closed.    FHT:   140 BPM     Lab Review    CBC:   Lab Results   Component Value Date    WBC 13.6 04/29/2020    RBC 3.76 04/29/2020    HGB 10.5 04/29/2020    HCT 32.7 04/29/2020    MCV 87.0 04/29/2020    MCH 27.9 04/29/2020    MCHC 32.1 04/29/2020    RDW 14.1 04/29/2020     04/29/2020    MPV 11.0 04/29/2020     CMP:    Lab Results   Component Value Date     04/29/2020    K 4.7 04/29/2020     04/29/2020    CO2 19 04/29/2020    BUN 9 04/29/2020    CREATININE 0.5 04/29/2020    GFRAA >60 04/29/2020    LABGLOM >60 04/29/2020    GLUCOSE 99 04/29/2020    GLUCOSE 92 11/23/2010    PROT 6.6 04/29/2020    LABALBU 3.5 04/29/2020    LABALBU 4.5 11/23/2010    CALCIUM 8.9 04/29/2020    BILITOT <0.2 04/29/2020    ALKPHOS 90 04/29/2020    AST 19 04/29/2020    ALT 7 04/29/2020     U/A:    Lab Results   Component Value Date    COLORU Yellow 04/29/2020    PHUR 7.0 04/29/2020    LABCAST FEW 11/14/2016    WBCUA 2-5 04/29/2020    WBCUA 2-5 11/23/2010    RBCUA NONE 04/29/2020    RBCUA 0-1 11/23/2010    MUCUS Present 06/05/2019    TRICHOMONAS RARE 07/05/2019    BACTERIA MODERATE 04/29/2020    CLARITYU Clear 04/29/2020    SPECGRAV 1.020 04/29/2020    LEUKOCYTESUR SMALL 04/29/2020    UROBILINOGEN 0.2 04/29/2020    BILIRUBINUR Negative 04/29/2020    BILIRUBINUR NEGATIVE 11/23/2010    BLOODU Negative 04/29/2020    GLUCOSEU Negative 04/29/2020    GLUCOSEU NEGATIVE 11/23/2010    AMORPHOUS MODERATE 04/29/2020          Assessment:      Threatened Premature Labor      Plan:      Monitored for contractions - findings: contractions every 4 minutes and reactive strip. Orders: Terbutaline 0.25 mg SQ, repeat Terbutaline 0.25 mg SQ and IV D5LR started.      Clayton Head MD,4/30/2020 8:08 AM

## 2020-05-27 ENCOUNTER — HOSPITAL ENCOUNTER (OUTPATIENT)
Age: 35
Discharge: HOME | End: 2020-05-29
Payer: COMMERCIAL

## 2020-05-27 PROCEDURE — 87081 CULTURE SCREEN ONLY: CPT

## 2020-05-30 LAB — GROUP B STREP CULTURE: NORMAL

## 2020-06-04 ENCOUNTER — HOSPITAL ENCOUNTER (OUTPATIENT)
Age: 35
Setting detail: OBSERVATION
Discharge: HOME | End: 2020-06-04
Attending: OBSTETRICS & GYNECOLOGY | Admitting: OBSTETRICS & GYNECOLOGY
Payer: COMMERCIAL

## 2020-06-04 VITALS
SYSTOLIC BLOOD PRESSURE: 127 MMHG | DIASTOLIC BLOOD PRESSURE: 67 MMHG | TEMPERATURE: 98.3 F | HEIGHT: 69 IN | WEIGHT: 223 LBS | HEART RATE: 96 BPM | BODY MASS INDEX: 33.03 KG/M2 | RESPIRATION RATE: 17 BRPM

## 2020-06-04 PROBLEM — Z34.90 TERM PREGNANCY: Status: ACTIVE | Noted: 2020-06-04

## 2020-06-04 PROCEDURE — 6370000000 HC RX 637 (ALT 250 FOR IP): Performed by: OBSTETRICS & GYNECOLOGY

## 2020-06-04 PROCEDURE — 2580000003 HC RX 258: Performed by: OBSTETRICS & GYNECOLOGY

## 2020-06-04 PROCEDURE — 96361 HYDRATE IV INFUSION ADD-ON: CPT

## 2020-06-04 PROCEDURE — G0378 HOSPITAL OBSERVATION PER HR: HCPCS

## 2020-06-04 PROCEDURE — 96365 THER/PROPH/DIAG IV INF INIT: CPT

## 2020-06-04 PROCEDURE — 96374 THER/PROPH/DIAG INJ IV PUSH: CPT

## 2020-06-04 PROCEDURE — 6360000002 HC RX W HCPCS: Performed by: OBSTETRICS & GYNECOLOGY

## 2020-06-04 PROCEDURE — G0379 DIRECT REFER HOSPITAL OBSERV: HCPCS

## 2020-06-04 PROCEDURE — 96360 HYDRATION IV INFUSION INIT: CPT

## 2020-06-04 RX ORDER — ACETAMINOPHEN 325 MG/1
650 TABLET ORAL EVERY 4 HOURS PRN
Status: DISCONTINUED | OUTPATIENT
Start: 2020-06-04 | End: 2020-06-04 | Stop reason: HOSPADM

## 2020-06-04 RX ORDER — SODIUM CHLORIDE, SODIUM LACTATE, POTASSIUM CHLORIDE, CALCIUM CHLORIDE 600; 310; 30; 20 MG/100ML; MG/100ML; MG/100ML; MG/100ML
INJECTION, SOLUTION INTRAVENOUS CONTINUOUS
Status: DISCONTINUED | OUTPATIENT
Start: 2020-06-04 | End: 2020-06-04 | Stop reason: HOSPADM

## 2020-06-04 RX ADMIN — BUTORPHANOL TARTRATE 1 MG: 2 INJECTION, SOLUTION INTRAMUSCULAR; INTRAVENOUS at 10:59

## 2020-06-04 RX ADMIN — ACETAMINOPHEN 650 MG: 325 TABLET, FILM COATED ORAL at 14:14

## 2020-06-04 RX ADMIN — SODIUM CHLORIDE, POTASSIUM CHLORIDE, SODIUM LACTATE AND CALCIUM CHLORIDE: 600; 310; 30; 20 INJECTION, SOLUTION INTRAVENOUS at 13:07

## 2020-06-04 RX ADMIN — SODIUM CHLORIDE, POTASSIUM CHLORIDE, SODIUM LACTATE AND CALCIUM CHLORIDE: 600; 310; 30; 20 INJECTION, SOLUTION INTRAVENOUS at 07:45

## 2020-06-04 ASSESSMENT — PAIN DESCRIPTION - DESCRIPTORS
DESCRIPTORS: CRAMPING
DESCRIPTORS: CRAMPING;HEADACHE

## 2020-06-04 ASSESSMENT — PAIN SCALES - GENERAL
PAINLEVEL_OUTOF10: 6
PAINLEVEL_OUTOF10: 6

## 2020-06-04 NOTE — H&P
Lab Results   Component Value Date    COLORU Yellow 04/29/2020    PHUR 7.0 04/29/2020    LABCAST FEW 11/14/2016    WBCUA 2-5 04/29/2020    WBCUA 2-5 11/23/2010    RBCUA NONE 04/29/2020    RBCUA 0-1 11/23/2010    MUCUS Present 06/05/2019    TRICHOMONAS RARE 07/05/2019    BACTERIA MODERATE 04/29/2020    CLARITYU Clear 04/29/2020    SPECGRAV 1.020 04/29/2020    LEUKOCYTESUR SMALL 04/29/2020    UROBILINOGEN 0.2 04/29/2020    BILIRUBINUR Negative 04/29/2020    BILIRUBINUR NEGATIVE 11/23/2010    BLOODU Negative 04/29/2020    GLUCOSEU Negative 04/29/2020    GLUCOSEU NEGATIVE 11/23/2010    AMORPHOUS MODERATE 04/29/2020          Assessment:     Early labor     Plan:      Monitored for contractions - findings: uterine irritability and reactive strip. Orders: observation.      Clayton Head MD,6/4/2020 7:06 AM

## 2020-06-04 NOTE — PROGRESS NOTES
Dr Condon Em, notified of patient arrival and complaint. notified of reactive . uterine contractions every 10 minutes. SVE 1/ thick.  notified of small amount of red blood noted on glove after exam. Orders received

## 2020-06-19 ENCOUNTER — ANESTHESIA (OUTPATIENT)
Dept: LABOR AND DELIVERY | Age: 35
DRG: 560 | End: 2020-06-19
Payer: COMMERCIAL

## 2020-06-19 ENCOUNTER — HOSPITAL ENCOUNTER (INPATIENT)
Age: 35
LOS: 2 days | Discharge: HOME | DRG: 560 | End: 2020-06-21
Attending: OBSTETRICS & GYNECOLOGY | Admitting: OBSTETRICS & GYNECOLOGY
Payer: COMMERCIAL

## 2020-06-19 ENCOUNTER — ANESTHESIA EVENT (OUTPATIENT)
Dept: LABOR AND DELIVERY | Age: 35
DRG: 560 | End: 2020-06-19
Payer: COMMERCIAL

## 2020-06-19 ENCOUNTER — APPOINTMENT (OUTPATIENT)
Dept: LABOR AND DELIVERY | Age: 35
DRG: 560 | End: 2020-06-19
Payer: COMMERCIAL

## 2020-06-19 PROBLEM — Z3A.39 39 WEEKS GESTATION OF PREGNANCY: Status: ACTIVE | Noted: 2020-06-19

## 2020-06-19 LAB
ABO/RH: NORMAL
AMPHETAMINE SCREEN, URINE: NOT DETECTED
ANION GAP SERPL CALCULATED.3IONS-SCNC: 11 MMOL/L (ref 7–16)
ANTIBODY SCREEN: NORMAL
BARBITURATE SCREEN URINE: NOT DETECTED
BENZODIAZEPINE SCREEN, URINE: NOT DETECTED
BUN BLDV-MCNC: 8 MG/DL (ref 6–20)
BUPRENORPHINE URINE: NOT DETECTED
CALCIUM SERPL-MCNC: 8.7 MG/DL (ref 8.6–10.2)
CANNABINOID SCREEN URINE: NOT DETECTED
CHLORIDE BLD-SCNC: 105 MMOL/L (ref 98–107)
CO2: 20 MMOL/L (ref 22–29)
COCAINE METABOLITE SCREEN URINE: NOT DETECTED
CREAT SERPL-MCNC: 0.5 MG/DL (ref 0.5–1)
GFR AFRICAN AMERICAN: >60
GFR NON-AFRICAN AMERICAN: >60 ML/MIN/1.73
GLUCOSE BLD-MCNC: 80 MG/DL (ref 74–99)
HCT VFR BLD CALC: 32.9 % (ref 34–48)
HEMOGLOBIN: 10.6 G/DL (ref 11.5–15.5)
Lab: NORMAL
MCH RBC QN AUTO: 27.1 PG (ref 26–35)
MCHC RBC AUTO-ENTMCNC: 32.2 % (ref 32–34.5)
MCV RBC AUTO: 84.1 FL (ref 80–99.9)
METHADONE SCREEN, URINE: NOT DETECTED
OPIATE SCREEN URINE: NOT DETECTED
OXYCODONE URINE: NOT DETECTED
PDW BLD-RTO: 13.8 FL (ref 11.5–15)
PHENCYCLIDINE SCREEN URINE: NOT DETECTED
PLATELET # BLD: 179 E9/L (ref 130–450)
PMV BLD AUTO: 11.5 FL (ref 7–12)
POTASSIUM SERPL-SCNC: 4 MMOL/L (ref 3.5–5)
RBC # BLD: 3.91 E12/L (ref 3.5–5.5)
SODIUM BLD-SCNC: 136 MMOL/L (ref 132–146)
WBC # BLD: 11.1 E9/L (ref 4.5–11.5)

## 2020-06-19 PROCEDURE — 7200000001 HC VAGINAL DELIVERY

## 2020-06-19 PROCEDURE — 86901 BLOOD TYPING SEROLOGIC RH(D): CPT

## 2020-06-19 PROCEDURE — 2500000003 HC RX 250 WO HCPCS: Performed by: ANESTHESIOLOGY

## 2020-06-19 PROCEDURE — 86850 RBC ANTIBODY SCREEN: CPT

## 2020-06-19 PROCEDURE — 80307 DRUG TEST PRSMV CHEM ANLYZR: CPT

## 2020-06-19 PROCEDURE — 2580000003 HC RX 258: Performed by: OBSTETRICS & GYNECOLOGY

## 2020-06-19 PROCEDURE — 6370000000 HC RX 637 (ALT 250 FOR IP)

## 2020-06-19 PROCEDURE — 36415 COLL VENOUS BLD VENIPUNCTURE: CPT

## 2020-06-19 PROCEDURE — 1220000001 HC SEMI PRIVATE L&D R&B

## 2020-06-19 PROCEDURE — 86900 BLOOD TYPING SEROLOGIC ABO: CPT

## 2020-06-19 PROCEDURE — 85027 COMPLETE CBC AUTOMATED: CPT

## 2020-06-19 PROCEDURE — 80048 BASIC METABOLIC PNL TOTAL CA: CPT

## 2020-06-19 PROCEDURE — 6360000002 HC RX W HCPCS: Performed by: OBSTETRICS & GYNECOLOGY

## 2020-06-19 PROCEDURE — 6370000000 HC RX 637 (ALT 250 FOR IP): Performed by: OBSTETRICS & GYNECOLOGY

## 2020-06-19 PROCEDURE — 3700000025 EPIDURAL BLOCK: Performed by: ANESTHESIOLOGY

## 2020-06-19 RX ORDER — ONDANSETRON HYDROCHLORIDE 8 MG/1
8 TABLET, FILM COATED ORAL EVERY 8 HOURS PRN
Status: DISCONTINUED | OUTPATIENT
Start: 2020-06-19 | End: 2020-06-21 | Stop reason: HOSPADM

## 2020-06-19 RX ORDER — EPHEDRINE SULFATE/0.9% NACL/PF 50 MG/5 ML
5 SYRINGE (ML) INTRAVENOUS PRN
Status: DISCONTINUED | OUTPATIENT
Start: 2020-06-19 | End: 2020-06-19

## 2020-06-19 RX ORDER — ACETAMINOPHEN 325 MG/1
650 TABLET ORAL EVERY 6 HOURS PRN
COMMUNITY
End: 2020-10-09 | Stop reason: ALTCHOICE

## 2020-06-19 RX ORDER — ACETAMINOPHEN 325 MG/1
TABLET ORAL
Status: COMPLETED
Start: 2020-06-19 | End: 2020-06-19

## 2020-06-19 RX ORDER — MODIFIED LANOLIN
OINTMENT (GRAM) TOPICAL PRN
Status: DISCONTINUED | OUTPATIENT
Start: 2020-06-19 | End: 2020-06-21 | Stop reason: HOSPADM

## 2020-06-19 RX ORDER — IBUPROFEN 800 MG/1
800 TABLET ORAL EVERY 6 HOURS PRN
Status: DISCONTINUED | OUTPATIENT
Start: 2020-06-19 | End: 2020-06-21 | Stop reason: HOSPADM

## 2020-06-19 RX ORDER — LIDOCAINE HYDROCHLORIDE 10 MG/ML
INJECTION, SOLUTION EPIDURAL; INFILTRATION; INTRACAUDAL; PERINEURAL
Status: DISCONTINUED
Start: 2020-06-19 | End: 2020-06-19

## 2020-06-19 RX ORDER — SODIUM CHLORIDE 0.9 % (FLUSH) 0.9 %
10 SYRINGE (ML) INJECTION PRN
Status: DISCONTINUED | OUTPATIENT
Start: 2020-06-19 | End: 2020-06-19

## 2020-06-19 RX ORDER — ACETAMINOPHEN 325 MG/1
650 TABLET ORAL EVERY 4 HOURS PRN
Status: DISCONTINUED | OUTPATIENT
Start: 2020-06-19 | End: 2020-06-21 | Stop reason: HOSPADM

## 2020-06-19 RX ORDER — SODIUM CHLORIDE 0.9 % (FLUSH) 0.9 %
10 SYRINGE (ML) INJECTION EVERY 12 HOURS SCHEDULED
Status: DISCONTINUED | OUTPATIENT
Start: 2020-06-19 | End: 2020-06-19

## 2020-06-19 RX ORDER — DOCUSATE SODIUM 100 MG/1
100 CAPSULE, LIQUID FILLED ORAL 2 TIMES DAILY
Status: DISCONTINUED | OUTPATIENT
Start: 2020-06-19 | End: 2020-06-21 | Stop reason: HOSPADM

## 2020-06-19 RX ORDER — SODIUM CHLORIDE 0.9 % (FLUSH) 0.9 %
10 SYRINGE (ML) INJECTION PRN
Status: DISCONTINUED | OUTPATIENT
Start: 2020-06-19 | End: 2020-06-21 | Stop reason: HOSPADM

## 2020-06-19 RX ORDER — ONDANSETRON 2 MG/ML
4 INJECTION INTRAMUSCULAR; INTRAVENOUS EVERY 6 HOURS PRN
Status: DISCONTINUED | OUTPATIENT
Start: 2020-06-19 | End: 2020-06-19

## 2020-06-19 RX ORDER — DOCUSATE SODIUM 100 MG/1
100 CAPSULE, LIQUID FILLED ORAL 2 TIMES DAILY
Status: DISCONTINUED | OUTPATIENT
Start: 2020-06-19 | End: 2020-06-19

## 2020-06-19 RX ORDER — FERROUS SULFATE 325(65) MG
325 TABLET ORAL 2 TIMES DAILY WITH MEALS
Status: DISCONTINUED | OUTPATIENT
Start: 2020-06-20 | End: 2020-06-20

## 2020-06-19 RX ORDER — SODIUM CHLORIDE, SODIUM LACTATE, POTASSIUM CHLORIDE, CALCIUM CHLORIDE 600; 310; 30; 20 MG/100ML; MG/100ML; MG/100ML; MG/100ML
INJECTION, SOLUTION INTRAVENOUS CONTINUOUS
Status: DISCONTINUED | OUTPATIENT
Start: 2020-06-19 | End: 2020-06-19

## 2020-06-19 RX ORDER — CALCIUM CARBONATE 200(500)MG
1 TABLET,CHEWABLE ORAL DAILY
COMMUNITY
End: 2020-10-09 | Stop reason: ALTCHOICE

## 2020-06-19 RX ORDER — SODIUM CHLORIDE 0.9 % (FLUSH) 0.9 %
10 SYRINGE (ML) INJECTION EVERY 12 HOURS SCHEDULED
Status: DISCONTINUED | OUTPATIENT
Start: 2020-06-19 | End: 2020-06-21 | Stop reason: HOSPADM

## 2020-06-19 RX ADMIN — IBUPROFEN 800 MG: 800 TABLET, FILM COATED ORAL at 17:00

## 2020-06-19 RX ADMIN — SODIUM CHLORIDE, POTASSIUM CHLORIDE, SODIUM LACTATE AND CALCIUM CHLORIDE: 600; 310; 30; 20 INJECTION, SOLUTION INTRAVENOUS at 11:15

## 2020-06-19 RX ADMIN — ACETAMINOPHEN 650 MG: 325 TABLET, FILM COATED ORAL at 20:47

## 2020-06-19 RX ADMIN — IBUPROFEN 800 MG: 800 TABLET, FILM COATED ORAL at 22:45

## 2020-06-19 RX ADMIN — Medication 2 MILLI-UNITS/MIN: at 08:45

## 2020-06-19 RX ADMIN — ACETAMINOPHEN 650 MG: 325 TABLET, FILM COATED ORAL at 16:03

## 2020-06-19 RX ADMIN — DOCUSATE SODIUM 100 MG: 100 CAPSULE, LIQUID FILLED ORAL at 20:44

## 2020-06-19 RX ADMIN — SODIUM CHLORIDE, POTASSIUM CHLORIDE, SODIUM LACTATE AND CALCIUM CHLORIDE: 600; 310; 30; 20 INJECTION, SOLUTION INTRAVENOUS at 08:30

## 2020-06-19 RX ADMIN — Medication 15 ML/HR: at 11:20

## 2020-06-19 RX ADMIN — SODIUM CHLORIDE, POTASSIUM CHLORIDE, SODIUM LACTATE AND CALCIUM CHLORIDE: 600; 310; 30; 20 INJECTION, SOLUTION INTRAVENOUS at 13:44

## 2020-06-19 RX ADMIN — SODIUM CHLORIDE, PRESERVATIVE FREE 10 ML: 5 INJECTION INTRAVENOUS at 17:13

## 2020-06-19 ASSESSMENT — PAIN SCALES - GENERAL
PAINLEVEL_OUTOF10: 6
PAINLEVEL_OUTOF10: 7
PAINLEVEL_OUTOF10: 6
PAINLEVEL_OUTOF10: 5

## 2020-06-19 ASSESSMENT — LIFESTYLE VARIABLES: SMOKING_STATUS: 1

## 2020-06-19 NOTE — PROGRESS NOTES
IV Pitocin induction initiated.  RN assessing FHR and contractions [id number] while Pitocin infusing

## 2020-06-19 NOTE — H&P
04/29/2020    LABCAST FEW 11/14/2016    WBCUA 2-5 04/29/2020    WBCUA 2-5 11/23/2010    RBCUA NONE 04/29/2020    RBCUA 0-1 11/23/2010    MUCUS Present 06/05/2019    TRICHOMONAS RARE 07/05/2019    BACTERIA MODERATE 04/29/2020    CLARITYU Clear 04/29/2020    SPECGRAV 1.020 04/29/2020    LEUKOCYTESUR SMALL 04/29/2020    UROBILINOGEN 0.2 04/29/2020    BILIRUBINUR Negative 04/29/2020    BILIRUBINUR NEGATIVE 11/23/2010    BLOODU Negative 04/29/2020    GLUCOSEU Negative 04/29/2020    GLUCOSEU NEGATIVE 11/23/2010    AMORPHOUS MODERATE 04/29/2020          Assessment:     Early labor      Plan:      Monitored for contractions - findings: contractions every 6 minutes and reactive strip. Orders: Pitocin augmentation.      Clayton Head MD,6/19/2020 9:05 AM

## 2020-06-19 NOTE — ANESTHESIA PRE PROCEDURE
Department of Anesthesiology  Preprocedure Note       Name:  Costella Holter   Age:  37 y.o.  :  1985                                          MRN:  [id number]         Date:  2020      Surgeon: * No surgeons listed *    Procedure: * No procedures listed *    Medications prior to admission:   Prior to Admission medications    Medication Sig Start Date End Date Taking?  Authorizing Provider   calcium carbonate (TUMS) 500 MG chewable tablet Take 1 tablet by mouth daily   Yes Historical Provider, MD   acetaminophen (TYLENOL) 325 MG tablet Take 650 mg by mouth every 6 hours as needed for Pain   Yes Historical Provider, MD   Prenatal MV-Min-Fe Fum-FA-DHA (PRENATAL 1 PO) Take by mouth   Yes Historical Provider, MD   doxylamine-pyridoxine 10-10 MG TBEC Take 2 tablets po qhs 19   Eli Khalil DO       Current medications:    Current Facility-Administered Medications   Medication Dose Route Frequency Provider Last Rate Last Dose    lactated ringers infusion   Intravenous Continuous Amine ALISSA Head  mL/hr at 20 1043      sodium chloride flush 0.9 % injection 10 mL  10 mL Intravenous 2 times per day Amine ALISSA Head MD        sodium chloride flush 0.9 % injection 10 mL  10 mL Intravenous PRN Amine ALISSA Head MD        docusate sodium (COLACE) capsule 100 mg  100 mg Oral BID Clayton Head MD        ondansetron (ZOFRAN) injection 4 mg  4 mg Intravenous Q6H PRN Amine ALISSA Head MD        oxytocin (PITOCIN) 30 units in 500 mL infusion  1 marlon-units/min Intravenous Continuous PRN Amine ALISSA Head MD        oxytocin (PITOCIN) 30 units in 500 mL infusion  1 marlon-units/min Intravenous Continuous Amine ALISSA Head MD 8 mL/hr at 20 1015 8 marlon-units/min at 20 1015    lidocaine PF 1 % injection             ePHEDrine injection 5 mg  5 mg Intravenous PRN Juan Taylor MD        fentaNYL 1.85mcg/ml and Bupivicaine 0.1% in 0.9% NS 135ml infusion (OB) epidural  15 BMI:   Wt Readings from Last 3 Encounters:   06/19/20 231 lb (104.8 kg)   06/04/20 223 lb (101.2 kg)   03/19/20 210 lb (95.3 kg)     Body mass index is 34.11 kg/m². CBC:   Lab Results   Component Value Date    WBC 11.1 06/19/2020    RBC 3.91 06/19/2020    HGB 10.6 06/19/2020    HCT 32.9 06/19/2020    MCV 84.1 06/19/2020    RDW 13.8 06/19/2020     06/19/2020       CMP:   Lab Results   Component Value Date     06/19/2020    K 4.0 06/19/2020     06/19/2020    CO2 20 06/19/2020    BUN 8 06/19/2020    CREATININE 0.5 06/19/2020    GFRAA >60 06/19/2020    LABGLOM >60 06/19/2020    GLUCOSE 80 06/19/2020    GLUCOSE 92 11/23/2010    PROT 6.6 04/29/2020    CALCIUM 8.7 06/19/2020    BILITOT <0.2 04/29/2020    ALKPHOS 90 04/29/2020    AST 19 04/29/2020    ALT 7 04/29/2020       POC Tests: No results for input(s): POCGLU, POCNA, POCK, POCCL, POCBUN, POCHEMO, POCHCT in the last 72 hours.     Coags: No results found for: PROTIME, INR, APTT    HCG (If Applicable):   Lab Results   Component Value Date    PREGTESTUR POSITIVE 07/03/2018    PREGSERUM NEGATIVE 01/05/2014        ABGs: No results found for: PHART, PO2ART, WJW1AIH, ZMK1GAD, BEART, S8UWRQSW     Type & Screen (If Applicable):  No results found for: LABABO, LABRH    Drug/Infectious Status (If Applicable):  No results found for: HIV, HEPCAB    COVID-19 Screening (If Applicable): No results found for: COVID19      Anesthesia Evaluation  Patient summary reviewed and Nursing notes reviewed no history of anesthetic complications:   Airway: Mallampati: II  TM distance: >3 FB   Neck ROM: full  Mouth opening: > = 3 FB Dental:      Comment: Pt denies any loose chipped or missing teeth    Pulmonary: breath sounds clear to auscultation  (+) current smoker                           Cardiovascular:Negative CV ROS            Rhythm: regular  Rate: normal                    Neuro/Psych:   (+) psychiatric history:depression/anxiety             GI/Hepatic/Renal:

## 2020-06-19 NOTE — PROGRESS NOTES
States she is having low abdominal discomfort and that Tylenol ineffective-states headache continues but states she thinks she will feel better if she eats something. Requesting additional pain medication for relief-Motrin po given.

## 2020-06-19 NOTE — L&D DELIVERY NOTE
Department of Obstetrics and Gynecology  Spontaneous Vaginal Delivery Note         Pre-operative Diagnosis:  Term pregnancy, Induced labor, Single fetus and Uncomplicated pregnancy    Post-operative Diagnosis:  Same + live male wt 6 #,14 oz  Procedure:  Spontaneous vaginal delivery    Surgeon:  Clayton Head    Anesthesia:  epidural anesthesia    Estimated blood loss:  300ml    Specimen:  Placenta not sent to pathology     Cord blood sent Yes    Complications:  none    Condition:  OP position    Details of Procedure: The patient is a 37 y.o. female at 44w2d   OB History        15    Para   5    Term   5            AB   7    Living   5       SAB   7    TAB        Ectopic        Molar        Multiple        Live Births   5             who was admitted for active phase labor. She received the following interventions: ARBOW and IV Pitocin augmentation She was known to be GBS negative and did not receive antibiotic prophylaxis. The patient progressed well,did receive an epidural, became complete and started to push. After pushing for 2 x the fetal head was at the perineum, nose and mouth suctioned with bulb suction and the rest of the infant delivered atraumatically, placed on mother abdomen. Cord was clamped and cut and infant handed off to the waiting nurse for evaluation. The delivery of the placenta was spontaneous. The perineum and vagina were explored and  no tears or lacerations were noted.

## 2020-06-20 LAB
HCT VFR BLD CALC: 34.4 % (ref 34–48)
HEMOGLOBIN: 10.5 G/DL (ref 11.5–15.5)

## 2020-06-20 PROCEDURE — 1220000001 HC SEMI PRIVATE L&D R&B

## 2020-06-20 PROCEDURE — 6370000000 HC RX 637 (ALT 250 FOR IP): Performed by: OBSTETRICS & GYNECOLOGY

## 2020-06-20 PROCEDURE — 85014 HEMATOCRIT: CPT

## 2020-06-20 PROCEDURE — 36415 COLL VENOUS BLD VENIPUNCTURE: CPT

## 2020-06-20 PROCEDURE — 85018 HEMOGLOBIN: CPT

## 2020-06-20 RX ORDER — OXYCODONE HYDROCHLORIDE AND ACETAMINOPHEN 5; 325 MG/1; MG/1
1 TABLET ORAL EVERY 4 HOURS PRN
Status: DISCONTINUED | OUTPATIENT
Start: 2020-06-20 | End: 2020-06-21 | Stop reason: HOSPADM

## 2020-06-20 RX ADMIN — OXYCODONE HYDROCHLORIDE AND ACETAMINOPHEN 1 TABLET: 5; 325 TABLET ORAL at 20:28

## 2020-06-20 RX ADMIN — IBUPROFEN 800 MG: 800 TABLET, FILM COATED ORAL at 05:47

## 2020-06-20 RX ADMIN — DOCUSATE SODIUM 100 MG: 100 CAPSULE, LIQUID FILLED ORAL at 20:28

## 2020-06-20 RX ADMIN — ACETAMINOPHEN 650 MG: 325 TABLET, FILM COATED ORAL at 22:42

## 2020-06-20 RX ADMIN — OXYCODONE HYDROCHLORIDE AND ACETAMINOPHEN 1 TABLET: 5; 325 TABLET ORAL at 14:33

## 2020-06-20 RX ADMIN — DOCUSATE SODIUM 100 MG: 100 CAPSULE, LIQUID FILLED ORAL at 08:57

## 2020-06-20 RX ADMIN — OXYCODONE HYDROCHLORIDE AND ACETAMINOPHEN 1 TABLET: 5; 325 TABLET ORAL at 08:57

## 2020-06-20 RX ADMIN — ACETAMINOPHEN 650 MG: 325 TABLET, FILM COATED ORAL at 01:21

## 2020-06-20 RX ADMIN — IBUPROFEN 800 MG: 800 TABLET, FILM COATED ORAL at 18:10

## 2020-06-20 RX ADMIN — IBUPROFEN 800 MG: 800 TABLET, FILM COATED ORAL at 11:49

## 2020-06-20 ASSESSMENT — PAIN SCALES - GENERAL
PAINLEVEL_OUTOF10: 8
PAINLEVEL_OUTOF10: 4
PAINLEVEL_OUTOF10: 7
PAINLEVEL_OUTOF10: 6
PAINLEVEL_OUTOF10: 8
PAINLEVEL_OUTOF10: 7
PAINLEVEL_OUTOF10: 9
PAINLEVEL_OUTOF10: 7

## 2020-06-20 NOTE — PROGRESS NOTES
This RN found pt asleep with  in bed. Reinforced safe sleep for her and baby. Pt stated she understands.

## 2020-06-20 NOTE — PLAN OF CARE
Problem: Anxiety:  Goal: Level of anxiety will decrease  Description: Level of anxiety will decrease  Outcome: Met This Shift     Problem: Fluid Volume - Imbalance:  Goal: Absence of imbalanced fluid volume signs and symptoms  Description: Absence of imbalanced fluid volume signs and symptoms  Outcome: Met This Shift  Goal: Absence of intrapartum hemorrhage signs and symptoms  Description: Absence of intrapartum hemorrhage signs and symptoms  Outcome: Met This Shift     Problem: Infection - Intrapartum Infection:  Goal: Will show no infection signs and symptoms  Description: Will show no infection signs and symptoms  Outcome: Met This Shift     Problem:  Screening:  Goal: Ability to make informed decisions regarding treatment has improved  Description: Ability to make informed decisions regarding treatment has improved  Outcome: Met This Shift     Problem: Tissue Perfusion - Uteroplacental, Altered:  Description: [TRUNCATED] For intrapartum patients with recurrent variable decelerations of the fetal heart rate, consider transcervical amnioinfusion. For patients in labor, avoid prophylactic use of continuous maternal oxygen supplementation to prevent nonreassu . ..   Goal: Absence of abnormal fetal heart rate pattern  Description: Absence of abnormal fetal heart rate pattern  Outcome: Met This Shift     Problem: Urinary Retention:  Goal: Experiences of bladder distention will decrease  Description: Experiences of bladder distention will decrease  Outcome: Met This Shift  Goal: Urinary elimination within specified parameters  Description: Urinary elimination within specified parameters  Outcome: Met This Shift

## 2020-06-21 VITALS
SYSTOLIC BLOOD PRESSURE: 99 MMHG | DIASTOLIC BLOOD PRESSURE: 51 MMHG | HEIGHT: 69 IN | BODY MASS INDEX: 34.21 KG/M2 | TEMPERATURE: 98 F | RESPIRATION RATE: 18 BRPM | WEIGHT: 231 LBS | OXYGEN SATURATION: 100 % | HEART RATE: 82 BPM

## 2020-06-21 PROBLEM — O26.90 PREGNANCY RELATED CONDITION: Status: RESOLVED | Noted: 2020-04-29 | Resolved: 2020-06-21

## 2020-06-21 PROBLEM — Z3A.28 28 WEEKS GESTATION OF PREGNANCY: Status: RESOLVED | Noted: 2020-03-12 | Resolved: 2020-06-21

## 2020-06-21 PROCEDURE — 90715 TDAP VACCINE 7 YRS/> IM: CPT | Performed by: OBSTETRICS & GYNECOLOGY

## 2020-06-21 PROCEDURE — 6370000000 HC RX 637 (ALT 250 FOR IP): Performed by: OBSTETRICS & GYNECOLOGY

## 2020-06-21 PROCEDURE — 90471 IMMUNIZATION ADMIN: CPT | Performed by: OBSTETRICS & GYNECOLOGY

## 2020-06-21 PROCEDURE — 6360000002 HC RX W HCPCS: Performed by: OBSTETRICS & GYNECOLOGY

## 2020-06-21 RX ORDER — IBUPROFEN 800 MG/1
800 TABLET ORAL EVERY 6 HOURS PRN
Qty: 120 TABLET | Refills: 1 | Status: SHIPPED | OUTPATIENT
Start: 2020-06-21 | End: 2020-10-09 | Stop reason: ALTCHOICE

## 2020-06-21 RX ADMIN — TETANUS TOXOID, REDUCED DIPHTHERIA TOXOID AND ACELLULAR PERTUSSIS VACCINE, ADSORBED 0.5 ML: 5; 2.5; 8; 8; 2.5 SUSPENSION INTRAMUSCULAR at 09:03

## 2020-06-21 RX ADMIN — OXYCODONE HYDROCHLORIDE AND ACETAMINOPHEN 1 TABLET: 5; 325 TABLET ORAL at 00:19

## 2020-06-21 RX ADMIN — OXYCODONE HYDROCHLORIDE AND ACETAMINOPHEN 1 TABLET: 5; 325 TABLET ORAL at 05:08

## 2020-06-21 RX ADMIN — IBUPROFEN 800 MG: 800 TABLET, FILM COATED ORAL at 00:19

## 2020-06-21 RX ADMIN — ACETAMINOPHEN 650 MG: 325 TABLET, FILM COATED ORAL at 07:42

## 2020-06-21 RX ADMIN — DOCUSATE SODIUM 100 MG: 100 CAPSULE, LIQUID FILLED ORAL at 07:42

## 2020-06-21 ASSESSMENT — PAIN SCALES - GENERAL
PAINLEVEL_OUTOF10: 3
PAINLEVEL_OUTOF10: 7
PAINLEVEL_OUTOF10: 6

## 2020-06-21 NOTE — DISCHARGE SUMMARY
Obstetrical Discharge Form         Patients Name  Evert Weaver    Gestational Age:  44w2d    Antepartum complications: none    Date of Delivery:   2020       2:51 PM      Type of Delivery:   Vaginal, Spontaneous [250]   Rupture Date/time:               Presentation:    Vertex [1]   Position:                         Anesthesia:    Epidural [254]     Feeding method:         Delivered By:   SARAI Gillespie     Baby:       Information for the patient's :  Clementien Dafne Hernandez [[id number]]          Intrapartum complications: None  Postpartum complications: none  Discharge Date:   2020  Discharge Condition: Stable    Plan:   Follow up    in 6 weeks

## 2020-10-09 ENCOUNTER — HOSPITAL ENCOUNTER (EMERGENCY)
Age: 35
Discharge: HOME | End: 2020-10-09
Attending: FAMILY MEDICINE
Payer: COMMERCIAL

## 2020-10-09 VITALS
SYSTOLIC BLOOD PRESSURE: 116 MMHG | OXYGEN SATURATION: 99 % | RESPIRATION RATE: 20 BRPM | TEMPERATURE: 97.4 F | DIASTOLIC BLOOD PRESSURE: 71 MMHG | WEIGHT: 221 LBS | HEART RATE: 86 BPM | BODY MASS INDEX: 32.64 KG/M2

## 2020-10-09 PROCEDURE — G0381 LEV 2 HOSP TYPE B ED VISIT: HCPCS

## 2020-10-09 RX ORDER — BROMPHENIRAMINE MALEATE, PSEUDOEPHEDRINE HYDROCHLORIDE, AND DEXTROMETHORPHAN HYDROBROMIDE 2; 30; 10 MG/5ML; MG/5ML; MG/5ML
5 SYRUP ORAL 4 TIMES DAILY PRN
Qty: 90 ML | Refills: 0 | Status: SHIPPED | OUTPATIENT
Start: 2020-10-09 | End: 2020-10-28 | Stop reason: ALTCHOICE

## 2020-10-09 RX ORDER — AMOXICILLIN 500 MG/1
500 CAPSULE ORAL 3 TIMES DAILY
Qty: 30 CAPSULE | Refills: 0 | Status: SHIPPED | OUTPATIENT
Start: 2020-10-09 | End: 2020-10-19

## 2020-10-09 ASSESSMENT — PAIN DESCRIPTION - ONSET: ONSET: GRADUAL

## 2020-10-09 ASSESSMENT — PAIN SCALES - GENERAL
PAINLEVEL_OUTOF10: 0
PAINLEVEL_OUTOF10: 6

## 2020-10-09 ASSESSMENT — PAIN - FUNCTIONAL ASSESSMENT: PAIN_FUNCTIONAL_ASSESSMENT: 0-10

## 2020-10-09 ASSESSMENT — PAIN DESCRIPTION - DESCRIPTORS: DESCRIPTORS: ACHING

## 2020-10-09 ASSESSMENT — PAIN DESCRIPTION - PROGRESSION: CLINICAL_PROGRESSION: GRADUALLY WORSENING

## 2020-10-09 ASSESSMENT — PAIN DESCRIPTION - LOCATION: LOCATION: CHEST

## 2020-10-09 ASSESSMENT — PAIN DESCRIPTION - FREQUENCY: FREQUENCY: CONTINUOUS

## 2020-10-09 ASSESSMENT — PAIN DESCRIPTION - ORIENTATION: ORIENTATION: UPPER;ANTERIOR

## 2020-10-09 ASSESSMENT — PAIN DESCRIPTION - PAIN TYPE: TYPE: ACUTE PAIN

## 2020-10-09 NOTE — ED PROVIDER NOTES
HPI:  10/9/20,   Time: 1:20 PM EDT         Beti Belcher is a 37 y.o. female presenting to the ED for 3-day history of nasal congestion, yellow nasal discharge, and nonproductive cough that is worse at night. She denies fever chills or body aches. She denies sore throat or ear pain or shortness of breath. She denies diarrhea. She has 3 children in the house have similar symptoms. 1 of them attends . ROS:   Pertinent positives and negatives are stated within HPI, all other systems reviewed and are negative.  --------------------------------------------- PAST HISTORY ---------------------------------------------  Past Medical History:  has a past medical history of 28 weeks gestation of pregnancy, Abnormal Pap smear of cervix, Chronic kidney disease, Knee effusion, Mental disorder, Postpartum depression, Pyelonephritis of right kidney,  (spontaneous vaginal delivery), and Urinary tract infection in mother during second trimester of pregnancy. Past Surgical History:  has a past surgical history that includes Dilation and curettage of uterus (N/A, 2019). Social History:  reports that she has been smoking cigarettes. She has a 7.50 pack-year smoking history. She uses smokeless tobacco. She reports previous alcohol use. She reports that she does not use drugs. Family History: family history includes Allergy (Severe) in her nephew; Arthritis in her father and maternal grandfather; Breast Cancer in her mother; Cancer in her mother and paternal grandfather; Valjean Cris in her maternal grandfather; Diabetes in her father, maternal grandfather, and paternal grandfather; Heart Attack in her father and paternal grandfather; Heart Disease in her father and paternal grandfather; High Blood Pressure in her maternal grandmother; High Cholesterol in her maternal grandmother. The patients home medications have been reviewed.     Allergies: Penicillins and Vicodin

## 2020-10-28 ENCOUNTER — HOSPITAL ENCOUNTER (EMERGENCY)
Age: 35
Discharge: HOME | End: 2020-10-28
Attending: EMERGENCY MEDICINE
Payer: COMMERCIAL

## 2020-10-28 VITALS
DIASTOLIC BLOOD PRESSURE: 72 MMHG | RESPIRATION RATE: 20 BRPM | HEART RATE: 98 BPM | OXYGEN SATURATION: 98 % | TEMPERATURE: 97.5 F | SYSTOLIC BLOOD PRESSURE: 114 MMHG

## 2020-10-28 LAB — HCG(URINE) PREGNANCY TEST: POSITIVE

## 2020-10-28 PROCEDURE — G0382 LEV 3 HOSP TYPE B ED VISIT: HCPCS

## 2020-10-28 PROCEDURE — 81025 URINE PREGNANCY TEST: CPT

## 2020-10-28 RX ORDER — ALBUTEROL SULFATE 90 UG/1
2 AEROSOL, METERED RESPIRATORY (INHALATION) EVERY 6 HOURS PRN
Qty: 1 INHALER | Refills: 0 | Status: SHIPPED | OUTPATIENT
Start: 2020-10-28 | End: 2022-06-15

## 2020-10-28 RX ORDER — AZITHROMYCIN 250 MG/1
TABLET, FILM COATED ORAL
Qty: 1 PACKET | Refills: 0 | Status: SHIPPED | OUTPATIENT
Start: 2020-10-28 | End: 2020-11-07

## 2020-10-28 RX ORDER — DEXTROMETHORPHAN HYDROBROMIDE AND PROMETHAZINE HYDROCHLORIDE 15; 6.25 MG/5ML; MG/5ML
5 SYRUP ORAL 4 TIMES DAILY PRN
Qty: 120 ML | Refills: 0 | Status: SHIPPED | OUTPATIENT
Start: 2020-10-28 | End: 2020-11-04

## 2020-10-28 ASSESSMENT — ENCOUNTER SYMPTOMS
SORE THROAT: 0
ABDOMINAL DISTENTION: 0
BACK PAIN: 0
VOMITING: 0
DIARRHEA: 0
COUGH: 1
NAUSEA: 0
EYE REDNESS: 0
EYE PAIN: 0
SINUS PRESSURE: 0
RHINORRHEA: 1
WHEEZING: 1
SHORTNESS OF BREATH: 0
EYE DISCHARGE: 0

## 2020-10-28 ASSESSMENT — PAIN DESCRIPTION - DESCRIPTORS: DESCRIPTORS: ACHING;THROBBING

## 2020-10-28 ASSESSMENT — PAIN SCALES - GENERAL
PAINLEVEL_OUTOF10: 7
PAINLEVEL_OUTOF10: 7

## 2020-10-28 ASSESSMENT — PAIN DESCRIPTION - PAIN TYPE: TYPE: ACUTE PAIN

## 2020-10-28 ASSESSMENT — PAIN DESCRIPTION - FREQUENCY: FREQUENCY: CONTINUOUS

## 2020-10-28 ASSESSMENT — PAIN DESCRIPTION - LOCATION: LOCATION: EAR

## 2020-10-28 ASSESSMENT — PAIN DESCRIPTION - ORIENTATION: ORIENTATION: LEFT

## 2020-10-28 ASSESSMENT — PAIN DESCRIPTION - PROGRESSION: CLINICAL_PROGRESSION: GRADUALLY WORSENING

## 2020-10-28 ASSESSMENT — PAIN - FUNCTIONAL ASSESSMENT: PAIN_FUNCTIONAL_ASSESSMENT: 0-10

## 2020-10-28 ASSESSMENT — PAIN DESCRIPTION - ONSET: ONSET: GRADUAL

## 2020-10-28 NOTE — ED PROVIDER NOTES
The history is provided by the patient. URI   Presenting symptoms: congestion, cough, fatigue and rhinorrhea    Presenting symptoms: no ear pain, no facial pain, no fever and no sore throat    Severity:  Moderate  Onset quality:  Gradual  Duration:  6 days  Chronicity:  New  Associated symptoms: wheezing    Associated symptoms: no arthralgias and no headaches         Review of Systems   Constitutional: Positive for fatigue. Negative for chills and fever. HENT: Positive for congestion and rhinorrhea. Negative for ear pain, sinus pressure and sore throat. Eyes: Negative for pain, discharge and redness. Respiratory: Positive for cough and wheezing. Negative for shortness of breath. Cardiovascular: Negative for chest pain. Gastrointestinal: Negative for abdominal distention, diarrhea, nausea and vomiting. Genitourinary: Negative for dysuria and frequency. Musculoskeletal: Negative for arthralgias and back pain. Skin: Negative for rash and wound. Neurological: Negative for weakness and headaches. Hematological: Negative for adenopathy. All other systems reviewed and are negative. Physical Exam  Vitals signs and nursing note reviewed. Constitutional:       Appearance: She is well-developed. HENT:      Head: Normocephalic and atraumatic. Right Ear: Hearing, tympanic membrane and external ear normal.      Left Ear: Hearing, tympanic membrane and external ear normal.      Nose: Mucosal edema and congestion present. Mouth/Throat:      Pharynx: Uvula midline. Eyes:      General: Lids are normal.      Conjunctiva/sclera: Conjunctivae normal.      Pupils: Pupils are equal, round, and reactive to light. Neck:      Musculoskeletal: Normal range of motion and neck supple. Cardiovascular:      Rate and Rhythm: Normal rate and regular rhythm. Heart sounds: Normal heart sounds. No murmur. Pulmonary:      Effort: Pulmonary effort is normal. No respiratory distress. Breath sounds: Rhonchi present. No wheezing or rales. Abdominal:      General: Bowel sounds are normal.      Palpations: Abdomen is soft. Abdomen is not rigid. Tenderness: There is no abdominal tenderness. There is no guarding or rebound. Skin:     General: Skin is warm and dry. Findings: No abrasion or rash. Neurological:      Mental Status: She is alert and oriented to person, place, and time. GCS: GCS eye subscore is 4. GCS verbal subscore is 5. GCS motor subscore is 6. Cranial Nerves: No cranial nerve deficit. Sensory: No sensory deficit. Coordination: Coordination normal.      Gait: Gait normal.          Procedures     MDM          --------------------------------------------- PAST HISTORY ---------------------------------------------  Past Medical History:  has a past medical history of 28 weeks gestation of pregnancy, Abnormal Pap smear of cervix, Chronic kidney disease, Knee effusion, Mental disorder, Postpartum depression, Pyelonephritis of right kidney,  (spontaneous vaginal delivery), and Urinary tract infection in mother during second trimester of pregnancy. Past Surgical History:  has a past surgical history that includes Dilation and curettage of uterus (N/A, 2019). Social History:  reports that she has been smoking cigarettes. She has a 7.50 pack-year smoking history. She uses smokeless tobacco. She reports previous alcohol use. She reports that she does not use drugs.     Family History: family history includes Allergy (Severe) in her nephew; Arthritis in her father and maternal grandfather; Breast Cancer in her mother; Cancer in her mother and paternal grandfather; Carmen Manual in her maternal grandfather; Diabetes in her father, maternal grandfather, and paternal grandfather; Heart Attack in her father and paternal grandfather; Heart Disease in her father and paternal grandfather; High Blood Pressure in her maternal grandmother; High Cholesterol in her maternal grandmother. The patients home medications have been reviewed. Allergies: Penicillins and Vicodin [hydrocodone-acetaminophen]    -------------------------------------------------- RESULTS -------------------------------------------------  Labs:  Results for orders placed or performed during the hospital encounter of 10/28/20   Pregnancy, Urine   Result Value Ref Range    HCG(Urine) Pregnancy Test POSITIVE NEGATIVE       Radiology:  No orders to display       ------------------------- NURSING NOTES AND VITALS REVIEWED ---------------------------  Date / Time Roomed:  10/28/2020  5:02 PM  ED Bed Assignment:  04/04    The nursing notes within the ED encounter and vital signs as below have been reviewed. /72   Pulse 98   Temp 97.5 °F (36.4 °C) (Temporal)   Resp 20   LMP 07/15/2020 Comment: no home pregnancy test  SpO2 98%   Oxygen Saturation Interpretation: Normal      ------------------------------------------ PROGRESS NOTES ------------------------------------------  I have spoken with the patient and discussed todays results, in addition to providing specific details for the plan of care and counseling regarding the diagnosis and prognosis. Their questions are answered at this time and they are agreeable with the plan. I discussed at length with them reasons for immediate return here for re evaluation. They will followup with primary care by calling their office tomorrow. --------------------------------- ADDITIONAL PROVIDER NOTES ---------------------------------  At this time the patient is without objective evidence of an acute process requiring hospitalization or inpatient management. They have remained hemodynamically stable throughout their entire ED visit and are stable for discharge with outpatient follow-up. The plan has been discussed in detail and they are aware of the specific conditions for emergent return, as well as the importance of follow-up.       New Prescriptions    ALBUTEROL SULFATE  (90 BASE) MCG/ACT INHALER    Inhale 2 puffs into the lungs every 6 hours as needed for Wheezing    AZITHROMYCIN (ZITHROMAX Z-LEILA) 250 MG TABLET    TAKE 500MG PO DAY ONE. .. 250MG PO DAY TWO THROUGH FIVE   DISPENSE 6 TABS  NO REFILLS    PROMETHAZINE-DEXTROMETHORPHAN (PROMETHAZINE-DM) 6.25-15 MG/5ML SYRUP    Take 5 mLs by mouth 4 times daily as needed for Cough       Diagnosis:  1. Acute bronchitis, unspecified organism    2. Pregnancy confirmed by positive urine test        Disposition:  Patient's disposition: Discharge to home  Patient's condition is stable.                       Daralyn Canavan, MD  10/28/20 [id number]

## 2021-05-04 ENCOUNTER — HOSPITAL ENCOUNTER (OUTPATIENT)
Age: 36
Setting detail: OBSERVATION
Discharge: HOME | End: 2021-05-05
Attending: OBSTETRICS & GYNECOLOGY | Admitting: OBSTETRICS & GYNECOLOGY
Payer: COMMERCIAL

## 2021-05-04 PROBLEM — Z33.1 PREGNANCY AS INCIDENTAL FINDING: Status: ACTIVE | Noted: 2021-05-04

## 2021-05-04 LAB
ALBUMIN SERPL-MCNC: 3.2 G/DL (ref 3.5–5.2)
ALP BLD-CCNC: 111 U/L (ref 35–104)
ALT SERPL-CCNC: 5 U/L (ref 0–32)
AMPHETAMINE SCREEN, URINE: NOT DETECTED
ANION GAP SERPL CALCULATED.3IONS-SCNC: 10 MMOL/L (ref 7–16)
AST SERPL-CCNC: 11 U/L (ref 0–31)
BACTERIA: ABNORMAL /HPF
BARBITURATE SCREEN URINE: NOT DETECTED
BASOPHILS ABSOLUTE: 0.03 E9/L (ref 0–0.2)
BASOPHILS RELATIVE PERCENT: 0.3 % (ref 0–2)
BENZODIAZEPINE SCREEN, URINE: NOT DETECTED
BILIRUB SERPL-MCNC: <0.2 MG/DL (ref 0–1.2)
BILIRUBIN URINE: NEGATIVE
BLOOD, URINE: ABNORMAL
BUN BLDV-MCNC: 11 MG/DL (ref 6–20)
CALCIUM SERPL-MCNC: 8.4 MG/DL (ref 8.6–10.2)
CANNABINOID SCREEN URINE: NOT DETECTED
CHLORIDE BLD-SCNC: 104 MMOL/L (ref 98–107)
CLARITY: CLEAR
CO2: 20 MMOL/L (ref 22–29)
COCAINE METABOLITE SCREEN URINE: NOT DETECTED
COLOR: YELLOW
CREAT SERPL-MCNC: 0.7 MG/DL (ref 0.5–1)
EOSINOPHILS ABSOLUTE: 0.15 E9/L (ref 0.05–0.5)
EOSINOPHILS RELATIVE PERCENT: 1.3 % (ref 0–6)
EPITHELIAL CELLS, UA: ABNORMAL /HPF
FENTANYL SCREEN, URINE: NOT DETECTED
GFR AFRICAN AMERICAN: >60
GFR NON-AFRICAN AMERICAN: >60 ML/MIN/1.73
GLUCOSE BLD-MCNC: 91 MG/DL (ref 74–99)
GLUCOSE URINE: NEGATIVE MG/DL
HCT VFR BLD CALC: 32.8 % (ref 34–48)
HEMOGLOBIN: 10.8 G/DL (ref 11.5–15.5)
IMMATURE GRANULOCYTES #: 0.12 E9/L
IMMATURE GRANULOCYTES %: 1 % (ref 0–5)
KETONES, URINE: NEGATIVE MG/DL
LEUKOCYTE ESTERASE, URINE: NEGATIVE
LYMPHOCYTES ABSOLUTE: 1.6 E9/L (ref 1.5–4)
LYMPHOCYTES RELATIVE PERCENT: 13.5 % (ref 20–42)
Lab: NORMAL
MCH RBC QN AUTO: 28.4 PG (ref 26–35)
MCHC RBC AUTO-ENTMCNC: 32.9 % (ref 32–34.5)
MCV RBC AUTO: 86.3 FL (ref 80–99.9)
METHADONE SCREEN, URINE: NOT DETECTED
MONOCYTES ABSOLUTE: 0.91 E9/L (ref 0.1–0.95)
MONOCYTES RELATIVE PERCENT: 7.7 % (ref 2–12)
NEUTROPHILS ABSOLUTE: 9.04 E9/L (ref 1.8–7.3)
NEUTROPHILS RELATIVE PERCENT: 76.2 % (ref 43–80)
NITRITE, URINE: NEGATIVE
OPIATE SCREEN URINE: NOT DETECTED
OXYCODONE URINE: NOT DETECTED
PDW BLD-RTO: 13.6 FL (ref 11.5–15)
PH UA: 6.5 (ref 5–9)
PHENCYCLIDINE SCREEN URINE: NOT DETECTED
PLATELET # BLD: 175 E9/L (ref 130–450)
PMV BLD AUTO: 11.9 FL (ref 7–12)
POTASSIUM SERPL-SCNC: 4 MMOL/L (ref 3.5–5)
PROTEIN UA: NEGATIVE MG/DL
RBC # BLD: 3.8 E12/L (ref 3.5–5.5)
RBC UA: ABNORMAL /HPF (ref 0–2)
SODIUM BLD-SCNC: 134 MMOL/L (ref 132–146)
SPECIFIC GRAVITY UA: 1.02 (ref 1–1.03)
TOTAL PROTEIN: 6 G/DL (ref 6.4–8.3)
UROBILINOGEN, URINE: 1 E.U./DL
WBC # BLD: 11.9 E9/L (ref 4.5–11.5)
WBC UA: ABNORMAL /HPF (ref 0–5)

## 2021-05-04 PROCEDURE — 96366 THER/PROPH/DIAG IV INF ADDON: CPT

## 2021-05-04 PROCEDURE — 96367 TX/PROPH/DG ADDL SEQ IV INF: CPT

## 2021-05-04 PROCEDURE — 96374 THER/PROPH/DIAG INJ IV PUSH: CPT

## 2021-05-04 PROCEDURE — 96376 TX/PRO/DX INJ SAME DRUG ADON: CPT

## 2021-05-04 PROCEDURE — 6360000002 HC RX W HCPCS

## 2021-05-04 PROCEDURE — 36415 COLL VENOUS BLD VENIPUNCTURE: CPT

## 2021-05-04 PROCEDURE — 80053 COMPREHEN METABOLIC PANEL: CPT

## 2021-05-04 PROCEDURE — 96361 HYDRATE IV INFUSION ADD-ON: CPT

## 2021-05-04 PROCEDURE — 87088 URINE BACTERIA CULTURE: CPT

## 2021-05-04 PROCEDURE — 96372 THER/PROPH/DIAG INJ SC/IM: CPT

## 2021-05-04 PROCEDURE — 81001 URINALYSIS AUTO W/SCOPE: CPT

## 2021-05-04 PROCEDURE — 80307 DRUG TEST PRSMV CHEM ANLYZR: CPT

## 2021-05-04 PROCEDURE — 96360 HYDRATION IV INFUSION INIT: CPT

## 2021-05-04 PROCEDURE — 96365 THER/PROPH/DIAG IV INF INIT: CPT

## 2021-05-04 PROCEDURE — 2500000003 HC RX 250 WO HCPCS: Performed by: OBSTETRICS & GYNECOLOGY

## 2021-05-04 PROCEDURE — G0379 DIRECT REFER HOSPITAL OBSERV: HCPCS

## 2021-05-04 PROCEDURE — 6360000002 HC RX W HCPCS: Performed by: OBSTETRICS & GYNECOLOGY

## 2021-05-04 PROCEDURE — 85025 COMPLETE CBC W/AUTO DIFF WBC: CPT

## 2021-05-04 PROCEDURE — 2580000003 HC RX 258: Performed by: OBSTETRICS & GYNECOLOGY

## 2021-05-04 PROCEDURE — 6370000000 HC RX 637 (ALT 250 FOR IP): Performed by: OBSTETRICS & GYNECOLOGY

## 2021-05-04 PROCEDURE — 6370000000 HC RX 637 (ALT 250 FOR IP)

## 2021-05-04 PROCEDURE — G0378 HOSPITAL OBSERVATION PER HR: HCPCS

## 2021-05-04 RX ORDER — MEPERIDINE HYDROCHLORIDE 50 MG/ML
50 INJECTION INTRAMUSCULAR; INTRAVENOUS; SUBCUTANEOUS EVERY 4 HOURS PRN
Status: DISCONTINUED | OUTPATIENT
Start: 2021-05-04 | End: 2021-05-05 | Stop reason: HOSPADM

## 2021-05-04 RX ORDER — ONDANSETRON 2 MG/ML
4 INJECTION INTRAMUSCULAR; INTRAVENOUS EVERY 6 HOURS PRN
Status: DISCONTINUED | OUTPATIENT
Start: 2021-05-04 | End: 2021-05-05 | Stop reason: HOSPADM

## 2021-05-04 RX ORDER — ACETAMINOPHEN 325 MG/1
650 TABLET ORAL EVERY 4 HOURS PRN
Status: DISCONTINUED | OUTPATIENT
Start: 2021-05-04 | End: 2021-05-05 | Stop reason: HOSPADM

## 2021-05-04 RX ORDER — CLINDAMYCIN PHOSPHATE 900 MG/50ML
900 INJECTION INTRAVENOUS EVERY 8 HOURS
Status: DISCONTINUED | OUTPATIENT
Start: 2021-05-04 | End: 2021-05-05 | Stop reason: HOSPADM

## 2021-05-04 RX ORDER — ACETAMINOPHEN 325 MG/1
TABLET ORAL
Status: COMPLETED
Start: 2021-05-04 | End: 2021-05-04

## 2021-05-04 RX ORDER — SODIUM CHLORIDE, SODIUM LACTATE, POTASSIUM CHLORIDE, CALCIUM CHLORIDE 600; 310; 30; 20 MG/100ML; MG/100ML; MG/100ML; MG/100ML
INJECTION, SOLUTION INTRAVENOUS CONTINUOUS
Status: DISCONTINUED | OUTPATIENT
Start: 2021-05-04 | End: 2021-05-05 | Stop reason: HOSPADM

## 2021-05-04 RX ORDER — PROMETHAZINE HYDROCHLORIDE 25 MG/1
12.5 TABLET ORAL EVERY 6 HOURS PRN
Status: DISCONTINUED | OUTPATIENT
Start: 2021-05-04 | End: 2021-05-05 | Stop reason: HOSPADM

## 2021-05-04 RX ORDER — ACETAMINOPHEN 500 MG
500 TABLET ORAL EVERY 6 HOURS PRN
COMMUNITY
End: 2022-06-15

## 2021-05-04 RX ORDER — MEPERIDINE HYDROCHLORIDE 50 MG/ML
INJECTION INTRAMUSCULAR; INTRAVENOUS; SUBCUTANEOUS
Status: COMPLETED
Start: 2021-05-04 | End: 2021-05-04

## 2021-05-04 RX ORDER — GENTAMICIN SULFATE 60 MG/50ML
120 INJECTION, SOLUTION INTRAVENOUS EVERY 12 HOURS
Status: DISCONTINUED | OUTPATIENT
Start: 2021-05-04 | End: 2021-05-05 | Stop reason: HOSPADM

## 2021-05-04 RX ADMIN — SODIUM CHLORIDE, POTASSIUM CHLORIDE, SODIUM LACTATE AND CALCIUM CHLORIDE: 600; 310; 30; 20 INJECTION, SOLUTION INTRAVENOUS at 06:30

## 2021-05-04 RX ADMIN — ONDANSETRON 4 MG: 2 INJECTION INTRAMUSCULAR; INTRAVENOUS at 14:08

## 2021-05-04 RX ADMIN — ACETAMINOPHEN 650 MG: 325 TABLET, FILM COATED ORAL at 18:15

## 2021-05-04 RX ADMIN — GENTAMICIN SULFATE 120 MG: 60 INJECTION, SOLUTION INTRAVENOUS at 20:45

## 2021-05-04 RX ADMIN — CLINDAMYCIN PHOSPHATE 900 MG: 900 INJECTION, SOLUTION INTRAVENOUS at 15:58

## 2021-05-04 RX ADMIN — ONDANSETRON 4 MG: 2 INJECTION INTRAMUSCULAR; INTRAVENOUS at 06:35

## 2021-05-04 RX ADMIN — MEPERIDINE HYDROCHLORIDE 50 MG: 50 INJECTION INTRAMUSCULAR; INTRAVENOUS; SUBCUTANEOUS at 12:01

## 2021-05-04 RX ADMIN — SODIUM CHLORIDE, POTASSIUM CHLORIDE, SODIUM LACTATE AND CALCIUM CHLORIDE: 600; 310; 30; 20 INJECTION, SOLUTION INTRAVENOUS at 12:32

## 2021-05-04 RX ADMIN — PROMETHAZINE HYDROCHLORIDE 12.5 MG: 25 TABLET ORAL at 19:16

## 2021-05-04 RX ADMIN — GENTAMICIN SULFATE 120 MG: 60 INJECTION, SOLUTION INTRAVENOUS at 09:33

## 2021-05-04 RX ADMIN — CLINDAMYCIN PHOSPHATE 900 MG: 900 INJECTION, SOLUTION INTRAVENOUS at 08:31

## 2021-05-04 RX ADMIN — MEPERIDINE HYDROCHLORIDE 50 MG: 50 INJECTION, SOLUTION INTRAMUSCULAR; INTRAVENOUS; SUBCUTANEOUS at 07:53

## 2021-05-04 RX ADMIN — MEPERIDINE HYDROCHLORIDE 50 MG: 50 INJECTION INTRAMUSCULAR; INTRAVENOUS; SUBCUTANEOUS at 15:53

## 2021-05-04 RX ADMIN — PROMETHAZINE HYDROCHLORIDE 12.5 MG: 25 TABLET ORAL at 10:16

## 2021-05-04 RX ADMIN — MEPERIDINE HYDROCHLORIDE 50 MG: 50 INJECTION INTRAMUSCULAR; INTRAVENOUS; SUBCUTANEOUS at 20:15

## 2021-05-04 ASSESSMENT — PAIN - FUNCTIONAL ASSESSMENT
PAIN_FUNCTIONAL_ASSESSMENT: 0-10
PAIN_FUNCTIONAL_ASSESSMENT: ACTIVITIES ARE NOT PREVENTED

## 2021-05-04 ASSESSMENT — PAIN DESCRIPTION - DESCRIPTORS
DESCRIPTORS: THROBBING
DESCRIPTORS: CRAMPING

## 2021-05-04 ASSESSMENT — PAIN SCALES - GENERAL: PAINLEVEL_OUTOF10: 10

## 2021-05-04 NOTE — PROGRESS NOTES
Pt vomiting, EFM applied with positive fetal movement and audible fetal movement noted by RN. EFM monitored and assessed every 15 minutes. Positive left CVA tenderness.

## 2021-05-04 NOTE — PROGRESS NOTES
Dr. Padmini Gordon notified of pt arrival, urine lab results with IV hydration and nausea medication given. Orders received.

## 2021-05-04 NOTE — PROGRESS NOTES
32 y/o  pt 35 5/7 week pregnancy arrived via EMS crying and complaining of left sided abdominal pain. Positive fetal movement perceived by pt. No prenatal available for RN review. Pt oriented to LD 5.

## 2021-05-04 NOTE — H&P
>60 05/04/2021    LABGLOM >60 05/04/2021    GLUCOSE 91 05/04/2021    GLUCOSE 92 11/23/2010    PROT 6.0 05/04/2021    LABALBU 3.2 05/04/2021    LABALBU 4.5 11/23/2010    CALCIUM 8.4 05/04/2021    BILITOT <0.2 05/04/2021    ALKPHOS 111 05/04/2021    AST 11 05/04/2021    ALT 5 05/04/2021     U/A:    Lab Results   Component Value Date    COLORU Yellow 05/04/2021    PHUR 6.5 05/04/2021    LABCAST FEW 11/14/2016    WBCUA 1-3 05/04/2021    WBCUA 2-5 11/23/2010    RBCUA 5-10 05/04/2021    RBCUA 0-1 11/23/2010    MUCUS Present 06/05/2019    TRICHOMONAS RARE 07/05/2019    BACTERIA RARE 05/04/2021    CLARITYU Clear 05/04/2021    SPECGRAV 1.020 05/04/2021    LEUKOCYTESUR Negative 05/04/2021    UROBILINOGEN 1.0 05/04/2021    BILIRUBINUR Negative 05/04/2021    BILIRUBINUR NEGATIVE 11/23/2010    BLOODU MODERATE 05/04/2021    GLUCOSEU Negative 05/04/2021    GLUCOSEU NEGATIVE 11/23/2010    AMORPHOUS MODERATE 04/29/2020          Assessment:     Left flank pain  History of kidney stones    Plan:     IV fluids  Strain all urine  IV antibiotics  Symptomatic treatment    Clayton Head MD,5/4/2021 7:46 AM

## 2021-05-05 VITALS
BODY MASS INDEX: 33.47 KG/M2 | DIASTOLIC BLOOD PRESSURE: 65 MMHG | RESPIRATION RATE: 18 BRPM | SYSTOLIC BLOOD PRESSURE: 117 MMHG | HEIGHT: 69 IN | WEIGHT: 226 LBS | TEMPERATURE: 98.5 F | HEART RATE: 114 BPM

## 2021-05-05 PROBLEM — Z3A.35 35 WEEKS GESTATION OF PREGNANCY: Status: ACTIVE | Noted: 2020-06-19

## 2021-05-05 PROBLEM — Z34.90 TERM PREGNANCY: Status: RESOLVED | Noted: 2020-06-04 | Resolved: 2021-05-05

## 2021-05-05 PROBLEM — Z33.1 PREGNANCY AS INCIDENTAL FINDING: Status: RESOLVED | Noted: 2021-05-04 | Resolved: 2021-05-05

## 2021-05-05 PROBLEM — O23.42 URINARY TRACT INFECTION IN MOTHER DURING SECOND TRIMESTER OF PREGNANCY: Status: RESOLVED | Noted: 2020-03-20 | Resolved: 2021-05-05

## 2021-05-05 PROCEDURE — 96365 THER/PROPH/DIAG IV INF INIT: CPT

## 2021-05-05 PROCEDURE — 96360 HYDRATION IV INFUSION INIT: CPT

## 2021-05-05 PROCEDURE — 6360000002 HC RX W HCPCS: Performed by: OBSTETRICS & GYNECOLOGY

## 2021-05-05 PROCEDURE — 96372 THER/PROPH/DIAG INJ SC/IM: CPT

## 2021-05-05 PROCEDURE — 96368 THER/DIAG CONCURRENT INF: CPT

## 2021-05-05 PROCEDURE — 2500000003 HC RX 250 WO HCPCS: Performed by: OBSTETRICS & GYNECOLOGY

## 2021-05-05 PROCEDURE — 96367 TX/PROPH/DG ADDL SEQ IV INF: CPT

## 2021-05-05 PROCEDURE — 6370000000 HC RX 637 (ALT 250 FOR IP): Performed by: OBSTETRICS & GYNECOLOGY

## 2021-05-05 PROCEDURE — 96366 THER/PROPH/DIAG IV INF ADDON: CPT

## 2021-05-05 PROCEDURE — G0378 HOSPITAL OBSERVATION PER HR: HCPCS

## 2021-05-05 PROCEDURE — 96361 HYDRATE IV INFUSION ADD-ON: CPT

## 2021-05-05 RX ORDER — NITROFURANTOIN 25; 75 MG/1; MG/1
100 CAPSULE ORAL 2 TIMES DAILY
Qty: 14 CAPSULE | Refills: 0 | Status: SHIPPED | OUTPATIENT
Start: 2021-05-05 | End: 2021-05-12

## 2021-05-05 RX ADMIN — ACETAMINOPHEN 650 MG: 325 TABLET, FILM COATED ORAL at 05:59

## 2021-05-05 RX ADMIN — CLINDAMYCIN PHOSPHATE 900 MG: 900 INJECTION, SOLUTION INTRAVENOUS at 01:02

## 2021-05-05 RX ADMIN — MEPERIDINE HYDROCHLORIDE 50 MG: 50 INJECTION INTRAMUSCULAR; INTRAVENOUS; SUBCUTANEOUS at 01:02

## 2021-05-05 RX ADMIN — MEPERIDINE HYDROCHLORIDE 50 MG: 50 INJECTION INTRAMUSCULAR; INTRAVENOUS; SUBCUTANEOUS at 05:53

## 2021-05-05 ASSESSMENT — PAIN SCALES - GENERAL
PAINLEVEL_OUTOF10: 6
PAINLEVEL_OUTOF10: 6

## 2021-05-05 NOTE — PROGRESS NOTES
Discharge instructions given to patient. Patient verbalized understanding. No questions voiced when asked. Aware to f/u with Dr. Gisela Lopez in the office and standard labor precautions reviewed with patient. Teach back medications done with patient. Patient verbalized understanding.

## 2021-05-06 LAB — URINE CULTURE, ROUTINE: NORMAL

## 2021-06-03 ENCOUNTER — ANESTHESIA EVENT (OUTPATIENT)
Dept: LABOR AND DELIVERY | Age: 36
DRG: 560 | End: 2021-06-03
Payer: COMMERCIAL

## 2021-06-03 ENCOUNTER — APPOINTMENT (OUTPATIENT)
Dept: LABOR AND DELIVERY | Age: 36
DRG: 560 | End: 2021-06-03
Payer: COMMERCIAL

## 2021-06-03 ENCOUNTER — HOSPITAL ENCOUNTER (INPATIENT)
Age: 36
LOS: 2 days | Discharge: HOME | DRG: 560 | End: 2021-06-05
Attending: OBSTETRICS & GYNECOLOGY | Admitting: OBSTETRICS & GYNECOLOGY
Payer: COMMERCIAL

## 2021-06-03 ENCOUNTER — ANESTHESIA (OUTPATIENT)
Dept: LABOR AND DELIVERY | Age: 36
DRG: 560 | End: 2021-06-03
Payer: COMMERCIAL

## 2021-06-03 PROBLEM — O26.90 PREGNANCY RELATED CONDITION: Status: RESOLVED | Noted: 2020-04-29 | Resolved: 2021-06-03

## 2021-06-03 PROBLEM — O26.90 COMPLICATED PREGNANCY, UNSPECIFIED TRIMESTER: Status: ACTIVE | Noted: 2021-06-03

## 2021-06-03 PROBLEM — O26.90 COMPLICATED PREGNANCY, UNSPECIFIED TRIMESTER: Status: RESOLVED | Noted: 2021-06-03 | Resolved: 2021-06-03

## 2021-06-03 LAB
ABO/RH: NORMAL
ALBUMIN SERPL-MCNC: 3.7 G/DL (ref 3.5–5.2)
ALP BLD-CCNC: 143 U/L (ref 35–104)
ALT SERPL-CCNC: <5 U/L (ref 0–32)
AMPHETAMINE SCREEN, URINE: NOT DETECTED
ANION GAP SERPL CALCULATED.3IONS-SCNC: 12 MMOL/L (ref 7–16)
ANTIBODY SCREEN: NORMAL
AST SERPL-CCNC: 15 U/L (ref 0–31)
BARBITURATE SCREEN URINE: NOT DETECTED
BENZODIAZEPINE SCREEN, URINE: NOT DETECTED
BILIRUB SERPL-MCNC: 0.2 MG/DL (ref 0–1.2)
BUN BLDV-MCNC: 9 MG/DL (ref 6–20)
CALCIUM SERPL-MCNC: 10 MG/DL (ref 8.6–10.2)
CANNABINOID SCREEN URINE: NOT DETECTED
CHLORIDE BLD-SCNC: 105 MMOL/L (ref 98–107)
CO2: 19 MMOL/L (ref 22–29)
COCAINE METABOLITE SCREEN URINE: NOT DETECTED
CREAT SERPL-MCNC: 0.5 MG/DL (ref 0.5–1)
FENTANYL SCREEN, URINE: NOT DETECTED
GFR AFRICAN AMERICAN: >60
GFR NON-AFRICAN AMERICAN: >60 ML/MIN/1.73
GLUCOSE BLD-MCNC: 70 MG/DL (ref 74–99)
HCT VFR BLD CALC: 36.6 % (ref 34–48)
HEMOGLOBIN: 11.9 G/DL (ref 11.5–15.5)
Lab: NORMAL
MCH RBC QN AUTO: 28.3 PG (ref 26–35)
MCHC RBC AUTO-ENTMCNC: 32.5 % (ref 32–34.5)
MCV RBC AUTO: 87.1 FL (ref 80–99.9)
METHADONE SCREEN, URINE: NOT DETECTED
OPIATE SCREEN URINE: NOT DETECTED
OXYCODONE URINE: NOT DETECTED
PDW BLD-RTO: 14.6 FL (ref 11.5–15)
PHENCYCLIDINE SCREEN URINE: NOT DETECTED
PLATELET # BLD: 163 E9/L (ref 130–450)
PMV BLD AUTO: 12 FL (ref 7–12)
POTASSIUM SERPL-SCNC: 4 MMOL/L (ref 3.5–5)
RBC # BLD: 4.2 E12/L (ref 3.5–5.5)
SARS-COV-2, NAAT: NOT DETECTED
SODIUM BLD-SCNC: 136 MMOL/L (ref 132–146)
TOTAL PROTEIN: 6.7 G/DL (ref 6.4–8.3)
WBC # BLD: 10.8 E9/L (ref 4.5–11.5)

## 2021-06-03 PROCEDURE — 6360000002 HC RX W HCPCS: Performed by: OBSTETRICS & GYNECOLOGY

## 2021-06-03 PROCEDURE — 86901 BLOOD TYPING SEROLOGIC RH(D): CPT

## 2021-06-03 PROCEDURE — 80053 COMPREHEN METABOLIC PANEL: CPT

## 2021-06-03 PROCEDURE — 86850 RBC ANTIBODY SCREEN: CPT

## 2021-06-03 PROCEDURE — 85027 COMPLETE CBC AUTOMATED: CPT

## 2021-06-03 PROCEDURE — 80307 DRUG TEST PRSMV CHEM ANLYZR: CPT

## 2021-06-03 PROCEDURE — 3E033VJ INTRODUCTION OF OTHER HORMONE INTO PERIPHERAL VEIN, PERCUTANEOUS APPROACH: ICD-10-PCS | Performed by: OBSTETRICS & GYNECOLOGY

## 2021-06-03 PROCEDURE — 7200000001 HC VAGINAL DELIVERY

## 2021-06-03 PROCEDURE — 86900 BLOOD TYPING SEROLOGIC ABO: CPT

## 2021-06-03 PROCEDURE — 10907ZC DRAINAGE OF AMNIOTIC FLUID, THERAPEUTIC FROM PRODUCTS OF CONCEPTION, VIA NATURAL OR ARTIFICIAL OPENING: ICD-10-PCS | Performed by: OBSTETRICS & GYNECOLOGY

## 2021-06-03 PROCEDURE — 2580000003 HC RX 258: Performed by: OBSTETRICS & GYNECOLOGY

## 2021-06-03 PROCEDURE — 3700000025 EPIDURAL BLOCK: Performed by: ANESTHESIOLOGY

## 2021-06-03 PROCEDURE — 36415 COLL VENOUS BLD VENIPUNCTURE: CPT

## 2021-06-03 PROCEDURE — 2500000003 HC RX 250 WO HCPCS: Performed by: OBSTETRICS & GYNECOLOGY

## 2021-06-03 PROCEDURE — 1220000001 HC SEMI PRIVATE L&D R&B

## 2021-06-03 PROCEDURE — 2500000003 HC RX 250 WO HCPCS: Performed by: ANESTHESIOLOGY

## 2021-06-03 PROCEDURE — 87635 SARS-COV-2 COVID-19 AMP PRB: CPT

## 2021-06-03 RX ORDER — NALOXONE HYDROCHLORIDE 0.4 MG/ML
0.4 INJECTION, SOLUTION INTRAMUSCULAR; INTRAVENOUS; SUBCUTANEOUS PRN
Status: DISCONTINUED | OUTPATIENT
Start: 2021-06-03 | End: 2021-06-03

## 2021-06-03 RX ORDER — SODIUM CHLORIDE, SODIUM LACTATE, POTASSIUM CHLORIDE, AND CALCIUM CHLORIDE .6; .31; .03; .02 G/100ML; G/100ML; G/100ML; G/100ML
500 INJECTION, SOLUTION INTRAVENOUS PRN
Status: DISCONTINUED | OUTPATIENT
Start: 2021-06-03 | End: 2021-06-03

## 2021-06-03 RX ORDER — NALBUPHINE HCL 10 MG/ML
5 AMPUL (ML) INJECTION EVERY 4 HOURS PRN
Status: DISCONTINUED | OUTPATIENT
Start: 2021-06-03 | End: 2021-06-03

## 2021-06-03 RX ORDER — IBUPROFEN 800 MG/1
800 TABLET ORAL EVERY 6 HOURS PRN
Status: DISCONTINUED | OUTPATIENT
Start: 2021-06-03 | End: 2021-06-05 | Stop reason: HOSPADM

## 2021-06-03 RX ORDER — SODIUM CHLORIDE 0.9 % (FLUSH) 0.9 %
5-40 SYRINGE (ML) INJECTION PRN
Status: DISCONTINUED | OUTPATIENT
Start: 2021-06-03 | End: 2021-06-03

## 2021-06-03 RX ORDER — DOCUSATE SODIUM 100 MG/1
100 CAPSULE, LIQUID FILLED ORAL 2 TIMES DAILY
Status: DISCONTINUED | OUTPATIENT
Start: 2021-06-03 | End: 2021-06-03

## 2021-06-03 RX ORDER — LIDOCAINE HYDROCHLORIDE 10 MG/ML
INJECTION, SOLUTION EPIDURAL; INFILTRATION; INTRACAUDAL; PERINEURAL
Status: DISCONTINUED
Start: 2021-06-03 | End: 2021-06-03

## 2021-06-03 RX ORDER — DOCUSATE SODIUM 100 MG/1
100 CAPSULE, LIQUID FILLED ORAL 2 TIMES DAILY
Status: DISCONTINUED | OUTPATIENT
Start: 2021-06-04 | End: 2021-06-05 | Stop reason: HOSPADM

## 2021-06-03 RX ORDER — SODIUM CHLORIDE 0.9 % (FLUSH) 0.9 %
5-40 SYRINGE (ML) INJECTION EVERY 12 HOURS SCHEDULED
Status: DISCONTINUED | OUTPATIENT
Start: 2021-06-03 | End: 2021-06-03

## 2021-06-03 RX ORDER — ONDANSETRON 2 MG/ML
4 INJECTION INTRAMUSCULAR; INTRAVENOUS EVERY 6 HOURS PRN
Status: DISCONTINUED | OUTPATIENT
Start: 2021-06-03 | End: 2021-06-03

## 2021-06-03 RX ORDER — ONDANSETRON 4 MG/1
8 TABLET, ORALLY DISINTEGRATING ORAL EVERY 8 HOURS PRN
Status: DISCONTINUED | OUTPATIENT
Start: 2021-06-03 | End: 2021-06-05 | Stop reason: HOSPADM

## 2021-06-03 RX ORDER — SODIUM CHLORIDE, SODIUM LACTATE, POTASSIUM CHLORIDE, CALCIUM CHLORIDE 600; 310; 30; 20 MG/100ML; MG/100ML; MG/100ML; MG/100ML
INJECTION, SOLUTION INTRAVENOUS CONTINUOUS
Status: DISCONTINUED | OUTPATIENT
Start: 2021-06-03 | End: 2021-06-03

## 2021-06-03 RX ORDER — SODIUM CHLORIDE 0.9 % (FLUSH) 0.9 %
5-40 SYRINGE (ML) INJECTION PRN
Status: DISCONTINUED | OUTPATIENT
Start: 2021-06-03 | End: 2021-06-05 | Stop reason: HOSPADM

## 2021-06-03 RX ORDER — EPHEDRINE SULFATE/0.9% NACL/PF 50 MG/5 ML
5 SYRINGE (ML) INTRAVENOUS PRN
Status: DISCONTINUED | OUTPATIENT
Start: 2021-06-03 | End: 2021-06-03

## 2021-06-03 RX ORDER — SODIUM CHLORIDE 0.9 % (FLUSH) 0.9 %
5-40 SYRINGE (ML) INJECTION EVERY 12 HOURS SCHEDULED
Status: DISCONTINUED | OUTPATIENT
Start: 2021-06-04 | End: 2021-06-05 | Stop reason: HOSPADM

## 2021-06-03 RX ORDER — MODIFIED LANOLIN
OINTMENT (GRAM) TOPICAL PRN
Status: DISCONTINUED | OUTPATIENT
Start: 2021-06-03 | End: 2021-06-05 | Stop reason: HOSPADM

## 2021-06-03 RX ORDER — SODIUM CHLORIDE 9 MG/ML
25 INJECTION, SOLUTION INTRAVENOUS PRN
Status: DISCONTINUED | OUTPATIENT
Start: 2021-06-03 | End: 2021-06-03

## 2021-06-03 RX ORDER — FERROUS SULFATE 325(65) MG
325 TABLET ORAL 2 TIMES DAILY WITH MEALS
Status: DISCONTINUED | OUTPATIENT
Start: 2021-06-04 | End: 2021-06-05 | Stop reason: HOSPADM

## 2021-06-03 RX ORDER — CLINDAMYCIN PHOSPHATE 900 MG/50ML
900 INJECTION INTRAVENOUS EVERY 8 HOURS
Status: DISCONTINUED | OUTPATIENT
Start: 2021-06-03 | End: 2021-06-03

## 2021-06-03 RX ORDER — SODIUM CHLORIDE 9 MG/ML
25 INJECTION, SOLUTION INTRAVENOUS PRN
Status: DISCONTINUED | OUTPATIENT
Start: 2021-06-03 | End: 2021-06-05 | Stop reason: HOSPADM

## 2021-06-03 RX ORDER — SODIUM CHLORIDE, SODIUM LACTATE, POTASSIUM CHLORIDE, AND CALCIUM CHLORIDE .6; .31; .03; .02 G/100ML; G/100ML; G/100ML; G/100ML
1000 INJECTION, SOLUTION INTRAVENOUS PRN
Status: DISCONTINUED | OUTPATIENT
Start: 2021-06-03 | End: 2021-06-03

## 2021-06-03 RX ADMIN — Medication 2 MILLI-UNITS/MIN: at 15:20

## 2021-06-03 RX ADMIN — SODIUM CHLORIDE, POTASSIUM CHLORIDE, SODIUM LACTATE AND CALCIUM CHLORIDE: 600; 310; 30; 20 INJECTION, SOLUTION INTRAVENOUS at 15:00

## 2021-06-03 RX ADMIN — CLINDAMYCIN PHOSPHATE 900 MG: 900 INJECTION, SOLUTION INTRAVENOUS at 15:20

## 2021-06-03 RX ADMIN — SODIUM CHLORIDE, POTASSIUM CHLORIDE, SODIUM LACTATE AND CALCIUM CHLORIDE: 600; 310; 30; 20 INJECTION, SOLUTION INTRAVENOUS at 19:41

## 2021-06-03 RX ADMIN — Medication 15 ML: at 19:52

## 2021-06-03 RX ADMIN — Medication 15 ML/HR: at 19:58

## 2021-06-03 RX ADMIN — SODIUM CHLORIDE, POTASSIUM CHLORIDE, SODIUM LACTATE AND CALCIUM CHLORIDE: 600; 310; 30; 20 INJECTION, SOLUTION INTRAVENOUS at 21:20

## 2021-06-03 NOTE — PROGRESS NOTES
Florentino Pierce CRNA in room for procedure at [facility]. Patient sitting at side of bed for epidural insertion at 1946. Epidural catheter placed at [address]. Test dose given by Emiliano SEGURA CRNA at [facility]. Epidural bolus given by Florentino Pierce CRNA at 6180. Patient returned to semi-jackson's position in bed at [address] [facility]. Epidural pump programmed to continuously infuse by Florentino Pierce CRNA at [facility].

## 2021-06-03 NOTE — PROGRESS NOTES
[id number] 39w4d SHAD 6/6/21 ambulatory to the L&D unit for a scheduled IOL. EFM applied. Maternal perception of fetal movement noted. Pt denies any bleeding or leaking fluids. Pt denies any contractions or pain. Call light within reach.

## 2021-06-04 LAB
HCT VFR BLD CALC: 31.9 % (ref 34–48)
HEMOGLOBIN: 10.5 G/DL (ref 11.5–15.5)

## 2021-06-04 PROCEDURE — 6370000000 HC RX 637 (ALT 250 FOR IP): Performed by: OBSTETRICS & GYNECOLOGY

## 2021-06-04 PROCEDURE — 36415 COLL VENOUS BLD VENIPUNCTURE: CPT

## 2021-06-04 PROCEDURE — 85014 HEMATOCRIT: CPT

## 2021-06-04 PROCEDURE — 85018 HEMOGLOBIN: CPT

## 2021-06-04 PROCEDURE — 1220000001 HC SEMI PRIVATE L&D R&B

## 2021-06-04 RX ORDER — OXYCODONE HYDROCHLORIDE AND ACETAMINOPHEN 5; 325 MG/1; MG/1
1 TABLET ORAL EVERY 4 HOURS PRN
Status: DISPENSED | OUTPATIENT
Start: 2021-06-04 | End: 2021-06-05

## 2021-06-04 RX ORDER — IBUPROFEN 800 MG/1
800 TABLET ORAL EVERY 6 HOURS PRN
Qty: 120 TABLET | Refills: 0 | Status: ON HOLD | OUTPATIENT
Start: 2021-06-04 | End: 2022-03-26

## 2021-06-04 RX ADMIN — IBUPROFEN 800 MG: 800 TABLET, FILM COATED ORAL at 08:24

## 2021-06-04 RX ADMIN — Medication: at 08:26

## 2021-06-04 RX ADMIN — IBUPROFEN 800 MG: 800 TABLET, FILM COATED ORAL at 02:04

## 2021-06-04 RX ADMIN — DOCUSATE SODIUM 100 MG: 100 CAPSULE, LIQUID FILLED ORAL at 21:47

## 2021-06-04 RX ADMIN — OXYCODONE HYDROCHLORIDE AND ACETAMINOPHEN 1 TABLET: 5; 325 TABLET ORAL at 19:43

## 2021-06-04 RX ADMIN — OXYCODONE HYDROCHLORIDE AND ACETAMINOPHEN 1 TABLET: 5; 325 TABLET ORAL at 11:06

## 2021-06-04 RX ADMIN — DOCUSATE SODIUM 100 MG: 100 CAPSULE, LIQUID FILLED ORAL at 08:25

## 2021-06-04 RX ADMIN — OXYCODONE HYDROCHLORIDE AND ACETAMINOPHEN 1 TABLET: 5; 325 TABLET ORAL at 15:06

## 2021-06-04 RX ADMIN — IBUPROFEN 800 MG: 800 TABLET, FILM COATED ORAL at 14:28

## 2021-06-04 RX ADMIN — IBUPROFEN 800 MG: 800 TABLET, FILM COATED ORAL at 21:47

## 2021-06-04 ASSESSMENT — PAIN SCALES - GENERAL
PAINLEVEL_OUTOF10: 9
PAINLEVEL_OUTOF10: 6
PAINLEVEL_OUTOF10: 4
PAINLEVEL_OUTOF10: 5
PAINLEVEL_OUTOF10: 5
PAINLEVEL_OUTOF10: 10
PAINLEVEL_OUTOF10: 5

## 2021-06-04 NOTE — PROGRESS NOTES
Skin to skin stimulation initiated between mother and baby. [place] is pink and alert with regular respirations. Patient instructed on safe skin to skin care practices with proper positioning of baby and assurance of unobstructed airway; verbalizes understanding.

## 2021-06-04 NOTE — ANESTHESIA POSTPROCEDURE EVALUATION
Department of Anesthesiology  Postprocedure Note    Patient: Maddie Moss  MRN: [id number]  YOB: 1985  Date of evaluation: 6/4/2021  Time:  10:11 AM     Procedure Summary     Date: 06/03/21 Room / Location:     Anesthesia Start: 1944 Anesthesia Stop: 2247    Procedure: Labor Analgesia Diagnosis:     Scheduled Providers:  Responsible Provider: Freya Forde MD    Anesthesia Type: epidural ASA Status: 2          Anesthesia Type: epidural    Oneil Phase I:      Oneil Phase II:      Last vitals: Reviewed and per EMR flowsheets.        Anesthesia Post Evaluation    Patient location during evaluation: bedside  Patient participation: complete - patient participated  Level of consciousness: awake and alert  Airway patency: patent  Nausea & Vomiting: no nausea and no vomiting  Complications: no  Cardiovascular status: hemodynamically stable  Respiratory status: acceptable  Hydration status: euvolemic  Comments: Patient satisfied with epidural for labor

## 2021-06-04 NOTE — PROGRESS NOTES
Single live born female delivered vaginally at 2247. Bulb suction and tactile stimulation performed on  on mother's abdomen. Apgar's 9/9.

## 2021-06-04 NOTE — L&D DELIVERY NOTE
Department of Obstetrics and Gynecology  Spontaneous Vaginal Delivery Note         Pre-operative Diagnosis:  Term pregnancy, Induced labor, Single fetus and Pregnancy complicated by: Late prenatal care    Post-operative Diagnosis:  Same + live female wt 8 #,5 oz  Procedure:  Spontaneous vaginal delivery    Surgeon:  Sancho Johnson MD    Anesthesia:  epidural anesthesia    Estimated blood loss:  350ml    Specimen:  Placenta not sent to pathology     Cord blood sent Yes    Complications: Nuchal cord x1+ straight OP    Condition:  infant stable to general nursery and mother stable    Details of Procedure: The patient is a 31 y.o. female at 43w3d   OB History        15    Para   6    Term   6            AB   6    Living   6       SAB   6    TAB        Ectopic        Molar        Multiple   0    Live Births   6             who was admitted for early latent labor. She received the following interventions: ARBOW and IV Pitocin induction She was known to be GBS negative and did not receive antibiotic prophylaxis. The patient progressed well,did receive an epidural, became complete and started to push. After pushing for 3 x the fetal head was at the perineum, nose and mouth suctioned with bulb suction and the rest of the infant was delivered atraumatically from the straight OP position, placed on mother abdomen. Cord was clamped and cut and infant handed off to the waiting nurse for evaluation. The delivery of the placenta was spontaneous. The perineum and vagina were explored and no tears or lacerations were noted.

## 2021-06-04 NOTE — H&P
Subjective:      Payton Santos is an 31 y.o. female at 44 and 4/7 weeks gestation presenting with   Chief Complaint   Patient presents with    Scheduled Induction   . She is also complaining of contractions every 4 minutes lasting few seconds. Other associated symptoms include low back pain. Fetal Movement: normal.  Past Medical History:   Diagnosis Date    28 weeks gestation of pregnancy 3/12/2020    35 weeks gestation of pregnancy 2020    Abnormal Pap smear of cervix     Chronic kidney disease     Knee effusion     Mental disorder     Postpartum depression     Pyelonephritis of right kidney 3/20/2020     (spontaneous vaginal delivery) 2020    Urinary tract infection in mother during second trimester of pregnancy 3/20/2020       Review of Systems  Pertinent items are noted in HPI. Objective:      /61   Pulse 96   Temp 98.1 °F (36.7 °C) (Oral)   Resp 20   Ht 5' 9\" (1.753 m)   Wt 240 lb (108.9 kg)   LMP 2020   BMI 35.44 kg/m²   Blood pressure 102/61, pulse 96, temperature 98.1 °F (36.7 °C), temperature source Oral, resp. rate 20, height 5' 9\" (1.753 m), weight 240 lb (108.9 kg), last menstrual period 2020, not currently breastfeeding. General:   alert, appears stated age and cooperative   Cervix:   3 to 4 cm dilated, 60% effaced, vertex presentation at S- 2. .  Artificial rupture of membranes was performed and clear amniotic fluid was noted. Fetal scalp electrode was applied.    FHT:   135 BPM     Lab Review    CBC:   Lab Results   Component Value Date    WBC 10.8 2021    RBC 4.20 2021    HGB 11.9 2021    HCT 36.6 2021    MCV 87.1 2021    MCH 28.3 2021    MCHC 32.5 2021    RDW 14.6 2021     2021    MPV 12.0 2021     CMP:    Lab Results   Component Value Date     2021    K 4.0 2021     2021    CO2 19 2021    BUN 9 2021    CREATININE 0.5 2021    GFRAA >60 06/03/2021    LABGLOM >60 06/03/2021    GLUCOSE 70 06/03/2021    GLUCOSE 92 11/23/2010    PROT 6.7 06/03/2021    LABALBU 3.7 06/03/2021    LABALBU 4.5 11/23/2010    CALCIUM 10.0 06/03/2021    BILITOT 0.2 06/03/2021    ALKPHOS 143 06/03/2021    AST 15 06/03/2021    ALT <5 06/03/2021     U/A:    Lab Results   Component Value Date    COLORU Yellow 05/04/2021    PHUR 6.5 05/04/2021    LABCAST FEW 11/14/2016    WBCUA 1-3 05/04/2021    WBCUA 2-5 11/23/2010    RBCUA 5-10 05/04/2021    RBCUA 0-1 11/23/2010    MUCUS Present 06/05/2019    TRICHOMONAS RARE 07/05/2019    BACTERIA RARE 05/04/2021    CLARITYU Clear 05/04/2021    SPECGRAV 1.020 05/04/2021    LEUKOCYTESUR Negative 05/04/2021    UROBILINOGEN 1.0 05/04/2021    BILIRUBINUR Negative 05/04/2021    BILIRUBINUR NEGATIVE 11/23/2010    BLOODU MODERATE 05/04/2021    GLUCOSEU Negative 05/04/2021    GLUCOSEU NEGATIVE 11/23/2010    AMORPHOUS MODERATE 04/29/2020          Assessment:     Early labor    Late prenatal care  History of fast labor     Plan:      Monitored for contractions - findings: contractions every 4 minutes and reactive strip. Orders: Expectant vaginal delivery, epidural if desired.      Clayton Head MD,MD,6/3/2021 10:29 PM

## 2021-06-04 NOTE — ANESTHESIA PROCEDURE NOTES
Epidural Block    Patient location during procedure: OB  Start time: 6/3/2021 7:44 PM  End time: 6/3/2021 8:01 PM  Reason for block: labor epidural  Staffing  Anesthesiologist: Neetu Heller MD  Resident/CRNA: TREVOR Matos - CRNA  Preanesthetic Checklist  Completed: patient identified, IV checked, site marked, risks and benefits discussed, surgical consent, monitors and equipment checked, pre-op evaluation, timeout performed, anesthesia consent given, oxygen available and patient being monitored  Epidural  Patient position: sitting  Prep: Betadine  Patient monitoring: cardiac monitor, continuous pulse ox and frequent blood pressure checks  Approach: midline  Location: lumbar (1-5)  Injection technique: NOREEN saline  Provider prep: mask and sterile gloves  Needle  Needle type: Tuohy   Needle gauge: 18 G  Needle length: 2.5 in  Needle insertion depth: 8 cm  Catheter type: end hole  Catheter size: 20 G.   Catheter at skin depth: 12 cm  Test dose: negative  Assessment  Hemodynamics: stable  Attempts: 1

## 2021-06-04 NOTE — PROGRESS NOTES
notified of moderate to severe cramping by patient and that motrin is not relieving her pain-orders received

## 2021-06-04 NOTE — PROGRESS NOTES
Patient alternating heating pad to lower back and ice pack to abdomen for relief of discomfort with stated relief.

## 2021-06-05 VITALS
TEMPERATURE: 98.3 F | BODY MASS INDEX: 35.55 KG/M2 | DIASTOLIC BLOOD PRESSURE: 78 MMHG | SYSTOLIC BLOOD PRESSURE: 135 MMHG | RESPIRATION RATE: 16 BRPM | WEIGHT: 240 LBS | HEIGHT: 69 IN | HEART RATE: 90 BPM

## 2021-06-05 PROCEDURE — 90715 TDAP VACCINE 7 YRS/> IM: CPT | Performed by: OBSTETRICS & GYNECOLOGY

## 2021-06-05 PROCEDURE — 6360000002 HC RX W HCPCS: Performed by: OBSTETRICS & GYNECOLOGY

## 2021-06-05 PROCEDURE — 6370000000 HC RX 637 (ALT 250 FOR IP): Performed by: OBSTETRICS & GYNECOLOGY

## 2021-06-05 PROCEDURE — 88720 BILIRUBIN TOTAL TRANSCUT: CPT

## 2021-06-05 PROCEDURE — 90471 IMMUNIZATION ADMIN: CPT | Performed by: OBSTETRICS & GYNECOLOGY

## 2021-06-05 RX ADMIN — IBUPROFEN 800 MG: 800 TABLET, FILM COATED ORAL at 18:46

## 2021-06-05 RX ADMIN — IBUPROFEN 800 MG: 800 TABLET, FILM COATED ORAL at 12:49

## 2021-06-05 RX ADMIN — IBUPROFEN 800 MG: 800 TABLET, FILM COATED ORAL at 04:05

## 2021-06-05 RX ADMIN — OXYCODONE HYDROCHLORIDE AND ACETAMINOPHEN 1 TABLET: 5; 325 TABLET ORAL at 05:35

## 2021-06-05 RX ADMIN — TETANUS TOXOID, REDUCED DIPHTHERIA TOXOID AND ACELLULAR PERTUSSIS VACCINE, ADSORBED 0.5 ML: 5; 2.5; 8; 8; 2.5 SUSPENSION INTRAMUSCULAR at 16:20

## 2021-06-05 RX ADMIN — OXYCODONE HYDROCHLORIDE AND ACETAMINOPHEN 1 TABLET: 5; 325 TABLET ORAL at 09:41

## 2021-06-05 RX ADMIN — DOCUSATE SODIUM 100 MG: 100 CAPSULE, LIQUID FILLED ORAL at 09:41

## 2021-06-05 RX ADMIN — OXYCODONE HYDROCHLORIDE AND ACETAMINOPHEN 1 TABLET: 5; 325 TABLET ORAL at 00:22

## 2021-06-05 ASSESSMENT — PAIN SCALES - GENERAL
PAINLEVEL_OUTOF10: 4
PAINLEVEL_OUTOF10: 5

## 2021-06-05 ASSESSMENT — PAIN - FUNCTIONAL ASSESSMENT
PAIN_FUNCTIONAL_ASSESSMENT: 0-10
PAIN_FUNCTIONAL_ASSESSMENT: ACTIVITIES ARE NOT PREVENTED

## 2021-06-05 ASSESSMENT — PAIN DESCRIPTION - DESCRIPTORS: DESCRIPTORS: CRAMPING

## 2021-06-05 NOTE — DISCHARGE SUMMARY
Obstetrical Discharge Form         Patients Name  Andrae Stoner    Gestational Age:  43w3d    Antepartum complications: late prenatal care    Date of Delivery:   6/3/2021       10:47 PM      Type of Delivery:   Vaginal, Spontaneous [250]   Rupture Date/time:    6/3/2021      7:23 PM     Presentation:    Vertex [1]   Position:                      Anesthesia:    Epidural [254]     Feeding method:         Delivered By:   Theodor Dash R     Baby:       Information for the patient's :  Lizadie  Girl Antony Hernandez [[id number]]          Intrapartum complications: None  Postpartum complications: none  Discharge Date:   2021  Discharge Condition: Stable  Disposition:   Home    Plan:   Follow up    in 6 weeks

## 2021-06-05 NOTE — PROGRESS NOTES
Department of Obstetrics and Gynecology  Labor and Delivery  Attending Post Partum Progress Note      SUBJECTIVE:  Doing well with no complaints  OBJECTIVE:      Vitals:  /67   Pulse 90   Temp 98.2 °F (36.8 °C) (Oral)   Resp 14   Ht 5' 9\" (1.753 m)   Wt 240 lb (108.9 kg)   LMP 08/30/2020   Breastfeeding Unknown   BMI 35.44 kg/m²     ABDOMEN:  Soft, well contracted uterus   Lochia: Normal  No calf tenderness    DATA:    CBC:    Lab Results   Component Value Date    WBC 10.8 06/03/2021    RBC 4.20 06/03/2021    HGB 10.5 06/04/2021    HCT 31.9 06/04/2021    MCV 87.1 06/03/2021    RDW 14.6 06/03/2021     06/03/2021       ASSESSMENT & PLAN:      PPD # 1    Continue current care. Discharge home tomorrow.

## 2021-06-05 NOTE — PROGRESS NOTES
Assumed care of patient. Physical and pain assessment completed. Plan of care discussed. Call light within reach.

## 2022-03-17 ENCOUNTER — HOSPITAL ENCOUNTER (OUTPATIENT)
Age: 37
Discharge: HOME | End: 2022-03-17
Payer: COMMERCIAL

## 2022-03-17 LAB
GLUCOSE TOLERANCE SCREEN 50G: 151 MG/DL (ref 70–140)
HCT VFR BLD CALC: 33 % (ref 34–48)
HEMOGLOBIN: 10.8 G/DL (ref 11.5–15.5)
MCH RBC QN AUTO: 29.3 PG (ref 26–35)
MCHC RBC AUTO-ENTMCNC: 32.7 % (ref 32–34.5)
MCV RBC AUTO: 89.7 FL (ref 80–99.9)
PDW BLD-RTO: 13.1 FL (ref 11.5–15)
PLATELET # BLD: 152 E9/L (ref 130–450)
PMV BLD AUTO: 12.2 FL (ref 7–12)
RBC # BLD: 3.68 E12/L (ref 3.5–5.5)
WBC # BLD: 10.8 E9/L (ref 4.5–11.5)

## 2022-03-17 PROCEDURE — 82950 GLUCOSE TEST: CPT

## 2022-03-17 PROCEDURE — 85027 COMPLETE CBC AUTOMATED: CPT

## 2022-03-17 PROCEDURE — 36415 COLL VENOUS BLD VENIPUNCTURE: CPT

## 2022-03-26 ENCOUNTER — HOSPITAL ENCOUNTER (OUTPATIENT)
Age: 37
Discharge: HOME | End: 2022-03-26
Attending: OBSTETRICS & GYNECOLOGY | Admitting: OBSTETRICS & GYNECOLOGY
Payer: COMMERCIAL

## 2022-03-26 VITALS
TEMPERATURE: 97.5 F | OXYGEN SATURATION: 98 % | RESPIRATION RATE: 18 BRPM | DIASTOLIC BLOOD PRESSURE: 52 MMHG | SYSTOLIC BLOOD PRESSURE: 112 MMHG | HEART RATE: 93 BPM

## 2022-03-26 PROBLEM — O26.90 COMPLICATED PREGNANCY, UNSPECIFIED TRIMESTER: Status: ACTIVE | Noted: 2022-03-26

## 2022-03-26 PROBLEM — M54.9 BACK PAIN AFFECTING PREGNANCY IN THIRD TRIMESTER: Status: ACTIVE | Noted: 2022-03-26

## 2022-03-26 PROBLEM — O99.891 BACK PAIN AFFECTING PREGNANCY IN THIRD TRIMESTER: Status: ACTIVE | Noted: 2022-03-26

## 2022-03-26 LAB
BACTERIA: ABNORMAL /HPF
BILIRUBIN URINE: NEGATIVE
BLOOD, URINE: NEGATIVE
CLARITY: CLEAR
COLOR: YELLOW
CRYSTALS, UA: ABNORMAL /HPF
GLUCOSE URINE: NEGATIVE MG/DL
HCT VFR BLD CALC: 33.3 % (ref 34–48)
HEMOGLOBIN: 11.1 G/DL (ref 11.5–15.5)
KETONES, URINE: ABNORMAL MG/DL
LEUKOCYTE ESTERASE, URINE: NEGATIVE
MCH RBC QN AUTO: 28.9 PG (ref 26–35)
MCHC RBC AUTO-ENTMCNC: 33.3 % (ref 32–34.5)
MCV RBC AUTO: 86.7 FL (ref 80–99.9)
NITRITE, URINE: NEGATIVE
PDW BLD-RTO: 13.2 FL (ref 11.5–15)
PH UA: 6.5 (ref 5–9)
PLATELET # BLD: 164 E9/L (ref 130–450)
PMV BLD AUTO: 11.6 FL (ref 7–12)
PROTEIN UA: NEGATIVE MG/DL
RBC # BLD: 3.84 E12/L (ref 3.5–5.5)
RBC UA: ABNORMAL /HPF (ref 0–2)
SPECIFIC GRAVITY UA: 1.02 (ref 1–1.03)
UROBILINOGEN, URINE: 0.2 E.U./DL
WBC # BLD: 11.7 E9/L (ref 4.5–11.5)
WBC UA: ABNORMAL /HPF (ref 0–5)

## 2022-03-26 PROCEDURE — 96361 HYDRATE IV INFUSION ADD-ON: CPT

## 2022-03-26 PROCEDURE — 99202 OFFICE O/P NEW SF 15 MIN: CPT

## 2022-03-26 PROCEDURE — 81001 URINALYSIS AUTO W/SCOPE: CPT

## 2022-03-26 PROCEDURE — 2580000003 HC RX 258: Performed by: OBSTETRICS & GYNECOLOGY

## 2022-03-26 PROCEDURE — 85027 COMPLETE CBC AUTOMATED: CPT

## 2022-03-26 PROCEDURE — 6370000000 HC RX 637 (ALT 250 FOR IP): Performed by: OBSTETRICS & GYNECOLOGY

## 2022-03-26 PROCEDURE — 96360 HYDRATION IV INFUSION INIT: CPT

## 2022-03-26 PROCEDURE — 36415 COLL VENOUS BLD VENIPUNCTURE: CPT

## 2022-03-26 RX ORDER — ACETAMINOPHEN 500 MG
500 TABLET ORAL ONCE
Status: COMPLETED | OUTPATIENT
Start: 2022-03-26 | End: 2022-03-26

## 2022-03-26 RX ORDER — SODIUM CHLORIDE, SODIUM LACTATE, POTASSIUM CHLORIDE, CALCIUM CHLORIDE 600; 310; 30; 20 MG/100ML; MG/100ML; MG/100ML; MG/100ML
INJECTION, SOLUTION INTRAVENOUS CONTINUOUS
Status: DISCONTINUED | OUTPATIENT
Start: 2022-03-26 | End: 2022-03-26 | Stop reason: HOSPADM

## 2022-03-26 RX ORDER — SODIUM CHLORIDE, SODIUM LACTATE, POTASSIUM CHLORIDE, AND CALCIUM CHLORIDE .6; .31; .03; .02 G/100ML; G/100ML; G/100ML; G/100ML
500 INJECTION, SOLUTION INTRAVENOUS ONCE
Status: COMPLETED | OUTPATIENT
Start: 2022-03-26 | End: 2022-03-26

## 2022-03-26 RX ADMIN — ACETAMINOPHEN 500 MG: 500 TABLET ORAL at 18:03

## 2022-03-26 RX ADMIN — SODIUM CHLORIDE, POTASSIUM CHLORIDE, SODIUM LACTATE AND CALCIUM CHLORIDE: 600; 310; 30; 20 INJECTION, SOLUTION INTRAVENOUS at 20:20

## 2022-03-26 RX ADMIN — SODIUM CHLORIDE, POTASSIUM CHLORIDE, SODIUM LACTATE AND CALCIUM CHLORIDE 500 ML: 600; 310; 30; 20 INJECTION, SOLUTION INTRAVENOUS at 17:01

## 2022-03-26 ASSESSMENT — PAIN SCALES - GENERAL: PAINLEVEL_OUTOF10: 8

## 2022-03-26 ASSESSMENT — PAIN DESCRIPTION - DESCRIPTORS
DESCRIPTORS: CRAMPING;DULL;CONSTANT
DESCRIPTORS: CRAMPING;DULL;CONSTANT

## 2022-03-26 NOTE — PROGRESS NOTES
Dr. Shayne Booker notified of CBC & UA results as well as patient's continued complaint of discomfort. Order received.

## 2022-03-26 NOTE — PROGRESS NOTES
Dr. Nick Delarosa notified of patient's arrival to the unit and complaints of cramping, pressure, and spotting. Aware of fetal heart rate and uterine activity. Orders received.

## 2022-03-26 NOTE — PROGRESS NOTES
31w4d  EDC 22 ambulatory to L&D for c/o spotting, pelvic pressure, and abdominal cramping, which began 3 days ago and has been constant. Last reports intercourse 4 days ago. EFM applied. Maternal perception of fetal movement noted. Patient denies leaking of fluid or contractions. Call light in reach.

## 2022-03-27 NOTE — H&P
Subjective:      Pat Ceron is an 30 y.o. female at 32 and 4/7 weeks gestation presenting with   Chief Complaint   Patient presents with    Abdominal Cramping   . She is also complaining of contractions every a few minutes lasting 4few seconds. Other associated symptoms include low back pain. Fetal Movement: normal.  Past Medical History:   Diagnosis Date    28 weeks gestation of pregnancy 3/12/2020    35 weeks gestation of pregnancy 2020    39 weeks gestation of pregnancy 2020    Abnormal Pap smear of cervix     Chronic kidney disease     Knee effusion     Mental disorder     Postpartum depression     Pyelonephritis of right kidney 3/20/2020     (spontaneous vaginal delivery) 2020    Urinary tract infection in mother during second trimester of pregnancy 3/20/2020       Review of Systems  Pertinent items are noted in HPI. Objective:      BP (!) 112/52   Pulse 93   Temp 97.5 °F (36.4 °C)   Resp 18   SpO2 98%   Blood pressure (!) 112/52, pulse 93, temperature 97.5 °F (36.4 °C), resp. rate 18, SpO2 98 %, unknown if currently breastfeeding. General:   alert, appears stated age and cooperative   Cervix:   Deferred.    FHT:   135 BPM     Lab Review    CBC:   Lab Results   Component Value Date    WBC 11.7 2022    RBC 3.84 2022    HGB 11.1 2022    HCT 33.3 2022    MCV 86.7 2022    MCH 28.9 2022    MCHC 33.3 2022    RDW 13.2 2022     2022    MPV 11.6 2022     CMP:    Lab Results   Component Value Date     2021    K 4.0 2021     2021    CO2 19 2021    BUN 9 2021    CREATININE 0.5 2021    GFRAA >60 2021    LABGLOM >60 2021    GLUCOSE 151 2022    GLUCOSE 70 2021    GLUCOSE 92 2010    PROT 6.7 2021    LABALBU 3.7 2021    LABALBU 4.5 2010    CALCIUM 10.0 2021    BILITOT 0.2 2021    ALKPHOS 143 2021    AST 15 06/03/2021    ALT <5 06/03/2021     U/A:    Lab Results   Component Value Date    COLORU Yellow 03/26/2022    PHUR 6.5 03/26/2022    LABCAST FEW 11/14/2016    WBCUA 0-1 03/26/2022    WBCUA 2-5 11/23/2010    RBCUA NONE 03/26/2022    RBCUA 0-1 11/23/2010    MUCUS Present 06/05/2019    TRICHOMONAS RARE 07/05/2019    BACTERIA NONE SEEN 03/26/2022    CLARITYU Clear 03/26/2022    SPECGRAV 1.025 03/26/2022    LEUKOCYTESUR Negative 03/26/2022    UROBILINOGEN 0.2 03/26/2022    BILIRUBINUR Negative 03/26/2022    BILIRUBINUR NEGATIVE 11/23/2010    BLOODU Negative 03/26/2022    GLUCOSEU Negative 03/26/2022    GLUCOSEU NEGATIVE 11/23/2010    AMORPHOUS MODERATE 04/29/2020          Assessment:      31 weeks and 4-day pregnancy  Back pain     Plan:      Monitored for contractions - findings: uterine irritability and reactive strip. Orders: IV LR started.      Clayton Head MD,MD,3/26/2022 8:52 PM

## 2022-04-08 ENCOUNTER — HOSPITAL ENCOUNTER (OUTPATIENT)
Age: 37
Setting detail: OBSERVATION
Discharge: HOME | End: 2022-04-09
Attending: OBSTETRICS & GYNECOLOGY | Admitting: OBSTETRICS & GYNECOLOGY
Payer: COMMERCIAL

## 2022-04-08 ENCOUNTER — APPOINTMENT (OUTPATIENT)
Dept: ULTRASOUND IMAGING | Age: 37
End: 2022-04-08
Payer: COMMERCIAL

## 2022-04-08 PROBLEM — O26.90 PREGNANCY RELATED CONDITION: Status: ACTIVE | Noted: 2022-04-08

## 2022-04-08 PROBLEM — R10.9 ABDOMINAL PAIN: Status: ACTIVE | Noted: 2022-04-08

## 2022-04-08 LAB
ALBUMIN SERPL-MCNC: 3.4 G/DL (ref 3.5–5.2)
ALP BLD-CCNC: 92 U/L (ref 35–104)
ALT SERPL-CCNC: 7 U/L (ref 0–32)
AMNISURE, POC: NEGATIVE
AMPHETAMINE SCREEN, URINE: NOT DETECTED
ANION GAP SERPL CALCULATED.3IONS-SCNC: 12 MMOL/L (ref 7–16)
AST SERPL-CCNC: 12 U/L (ref 0–31)
BACTERIA: ABNORMAL /HPF
BARBITURATE SCREEN URINE: NOT DETECTED
BASOPHILS ABSOLUTE: 0.03 E9/L (ref 0–0.2)
BASOPHILS RELATIVE PERCENT: 0.2 % (ref 0–2)
BENZODIAZEPINE SCREEN, URINE: NOT DETECTED
BILIRUB SERPL-MCNC: <0.2 MG/DL (ref 0–1.2)
BILIRUBIN URINE: NEGATIVE
BLOOD, URINE: ABNORMAL
BUN BLDV-MCNC: 8 MG/DL (ref 6–20)
CALCIUM SERPL-MCNC: 8.8 MG/DL (ref 8.6–10.2)
CANNABINOID SCREEN URINE: NOT DETECTED
CHLORIDE BLD-SCNC: 106 MMOL/L (ref 98–107)
CLARITY: CLEAR
CO2: 17 MMOL/L (ref 22–29)
COCAINE METABOLITE SCREEN URINE: NOT DETECTED
COLOR: YELLOW
CREAT SERPL-MCNC: 0.4 MG/DL (ref 0.5–1)
EOSINOPHILS ABSOLUTE: 0.06 E9/L (ref 0.05–0.5)
EOSINOPHILS RELATIVE PERCENT: 0.3 % (ref 0–6)
EPITHELIAL CELLS, UA: ABNORMAL /HPF
FENTANYL SCREEN, URINE: NOT DETECTED
FETAL FIBRONECTIN: NEGATIVE
GFR AFRICAN AMERICAN: >60
GFR NON-AFRICAN AMERICAN: >60 ML/MIN/1.73
GLUCOSE BLD-MCNC: 104 MG/DL (ref 74–99)
GLUCOSE URINE: NEGATIVE MG/DL
HCT VFR BLD CALC: 34.8 % (ref 34–48)
HEMOGLOBIN: 11.2 G/DL (ref 11.5–15.5)
IMMATURE GRANULOCYTES #: 0.15 E9/L
IMMATURE GRANULOCYTES %: 0.9 % (ref 0–5)
KETONES, URINE: NEGATIVE MG/DL
LEUKOCYTE ESTERASE, URINE: NEGATIVE
LYMPHOCYTES ABSOLUTE: 0.75 E9/L (ref 1.5–4)
LYMPHOCYTES RELATIVE PERCENT: 4.3 % (ref 20–42)
Lab: NORMAL
Lab: NORMAL
MCH RBC QN AUTO: 28.4 PG (ref 26–35)
MCHC RBC AUTO-ENTMCNC: 32.2 % (ref 32–34.5)
MCV RBC AUTO: 88.1 FL (ref 80–99.9)
METHADONE SCREEN, URINE: NOT DETECTED
MONOCYTES ABSOLUTE: 0.81 E9/L (ref 0.1–0.95)
MONOCYTES RELATIVE PERCENT: 4.7 % (ref 2–12)
NEGATIVE QC PASS/FAIL: NORMAL
NEUTROPHILS ABSOLUTE: 15.47 E9/L (ref 1.8–7.3)
NEUTROPHILS RELATIVE PERCENT: 89.6 % (ref 43–80)
NITRITE, URINE: NEGATIVE
OPIATE SCREEN URINE: NOT DETECTED
OXYCODONE URINE: NOT DETECTED
PDW BLD-RTO: 13 FL (ref 11.5–15)
PH UA: 6.5 (ref 5–9)
PHENCYCLIDINE SCREEN URINE: NOT DETECTED
PLATELET # BLD: 161 E9/L (ref 130–450)
PMV BLD AUTO: 12 FL (ref 7–12)
POSITIVE QC PASS/FAIL: NORMAL
POTASSIUM SERPL-SCNC: 3.9 MMOL/L (ref 3.5–5)
PROTEIN UA: NEGATIVE MG/DL
RBC # BLD: 3.95 E12/L (ref 3.5–5.5)
RBC UA: ABNORMAL /HPF (ref 0–2)
SODIUM BLD-SCNC: 135 MMOL/L (ref 132–146)
SPECIFIC GRAVITY UA: 1.02 (ref 1–1.03)
TOTAL PROTEIN: 6.5 G/DL (ref 6.4–8.3)
UROBILINOGEN, URINE: 0.2 E.U./DL
WBC # BLD: 17.3 E9/L (ref 4.5–11.5)
WBC UA: ABNORMAL /HPF (ref 0–5)

## 2022-04-08 PROCEDURE — 96375 TX/PRO/DX INJ NEW DRUG ADDON: CPT

## 2022-04-08 PROCEDURE — 76815 OB US LIMITED FETUS(S): CPT

## 2022-04-08 PROCEDURE — 82731 ASSAY OF FETAL FIBRONECTIN: CPT

## 2022-04-08 PROCEDURE — 80307 DRUG TEST PRSMV CHEM ANLYZR: CPT

## 2022-04-08 PROCEDURE — 2580000003 HC RX 258: Performed by: OBSTETRICS & GYNECOLOGY

## 2022-04-08 PROCEDURE — 87147 CULTURE TYPE IMMUNOLOGIC: CPT

## 2022-04-08 PROCEDURE — 80053 COMPREHEN METABOLIC PANEL: CPT

## 2022-04-08 PROCEDURE — 6360000002 HC RX W HCPCS: Performed by: OBSTETRICS & GYNECOLOGY

## 2022-04-08 PROCEDURE — 81001 URINALYSIS AUTO W/SCOPE: CPT

## 2022-04-08 PROCEDURE — G0378 HOSPITAL OBSERVATION PER HR: HCPCS

## 2022-04-08 PROCEDURE — 99254 IP/OBS CNSLTJ NEW/EST MOD 60: CPT | Performed by: SURGERY

## 2022-04-08 PROCEDURE — 76705 ECHO EXAM OF ABDOMEN: CPT

## 2022-04-08 PROCEDURE — 6360000002 HC RX W HCPCS

## 2022-04-08 PROCEDURE — 96374 THER/PROPH/DIAG INJ IV PUSH: CPT

## 2022-04-08 PROCEDURE — 84112 EVAL AMNIOTIC FLUID PROTEIN: CPT

## 2022-04-08 PROCEDURE — 6370000000 HC RX 637 (ALT 250 FOR IP): Performed by: OBSTETRICS & GYNECOLOGY

## 2022-04-08 PROCEDURE — 96376 TX/PRO/DX INJ SAME DRUG ADON: CPT

## 2022-04-08 PROCEDURE — 96360 HYDRATION IV INFUSION INIT: CPT

## 2022-04-08 PROCEDURE — 36415 COLL VENOUS BLD VENIPUNCTURE: CPT

## 2022-04-08 PROCEDURE — 87081 CULTURE SCREEN ONLY: CPT

## 2022-04-08 PROCEDURE — 96361 HYDRATE IV INFUSION ADD-ON: CPT

## 2022-04-08 PROCEDURE — 85025 COMPLETE CBC W/AUTO DIFF WBC: CPT

## 2022-04-08 RX ORDER — BUTORPHANOL TARTRATE 1 MG/ML
INJECTION, SOLUTION INTRAMUSCULAR; INTRAVENOUS
Status: DISCONTINUED
Start: 2022-04-08 | End: 2022-04-08

## 2022-04-08 RX ORDER — SODIUM CHLORIDE, SODIUM LACTATE, POTASSIUM CHLORIDE, CALCIUM CHLORIDE 600; 310; 30; 20 MG/100ML; MG/100ML; MG/100ML; MG/100ML
INJECTION, SOLUTION INTRAVENOUS CONTINUOUS
Status: DISCONTINUED | OUTPATIENT
Start: 2022-04-08 | End: 2022-04-09 | Stop reason: HOSPADM

## 2022-04-08 RX ORDER — ACETAMINOPHEN 325 MG/1
650 TABLET ORAL EVERY 4 HOURS PRN
Status: DISCONTINUED | OUTPATIENT
Start: 2022-04-08 | End: 2022-04-09 | Stop reason: HOSPADM

## 2022-04-08 RX ORDER — NALBUPHINE HCL 10 MG/ML
5 AMPUL (ML) INJECTION
Status: DISCONTINUED | OUTPATIENT
Start: 2022-04-08 | End: 2022-04-08

## 2022-04-08 RX ORDER — SODIUM PHOSPHATE,MONO-DIBASIC 19G-7G/118
ENEMA (ML) RECTAL ONCE
Status: DISCONTINUED | OUTPATIENT
Start: 2022-04-08 | End: 2022-04-08

## 2022-04-08 RX ORDER — ONDANSETRON 2 MG/ML
4 INJECTION INTRAMUSCULAR; INTRAVENOUS EVERY 6 HOURS PRN
Status: DISCONTINUED | OUTPATIENT
Start: 2022-04-08 | End: 2022-04-09 | Stop reason: HOSPADM

## 2022-04-08 RX ORDER — SODIUM PHOSPHATE,MONO-DIBASIC 19G-7G/118
1 ENEMA (ML) RECTAL ONCE
Status: COMPLETED | OUTPATIENT
Start: 2022-04-08 | End: 2022-04-08

## 2022-04-08 RX ORDER — BUTORPHANOL TARTRATE 1 MG/ML
2 INJECTION, SOLUTION INTRAMUSCULAR; INTRAVENOUS ONCE
Status: COMPLETED | OUTPATIENT
Start: 2022-04-08 | End: 2022-04-08

## 2022-04-08 RX ORDER — SODIUM CHLORIDE, SODIUM LACTATE, POTASSIUM CHLORIDE, AND CALCIUM CHLORIDE .6; .31; .03; .02 G/100ML; G/100ML; G/100ML; G/100ML
500 INJECTION, SOLUTION INTRAVENOUS ONCE
Status: DISCONTINUED | OUTPATIENT
Start: 2022-04-08 | End: 2022-04-09 | Stop reason: HOSPADM

## 2022-04-08 RX ORDER — BUTORPHANOL TARTRATE 1 MG/ML
1 INJECTION, SOLUTION INTRAMUSCULAR; INTRAVENOUS ONCE
Status: DISCONTINUED | OUTPATIENT
Start: 2022-04-08 | End: 2022-04-09 | Stop reason: HOSPADM

## 2022-04-08 RX ADMIN — ACETAMINOPHEN 650 MG: 325 TABLET ORAL at 23:14

## 2022-04-08 RX ADMIN — SODIUM CHLORIDE, POTASSIUM CHLORIDE, SODIUM LACTATE AND CALCIUM CHLORIDE: 600; 310; 30; 20 INJECTION, SOLUTION INTRAVENOUS at 07:58

## 2022-04-08 RX ADMIN — NALBUPHINE HYDROCHLORIDE 5 MG: 10 INJECTION, SOLUTION INTRAMUSCULAR; INTRAVENOUS; SUBCUTANEOUS at 16:49

## 2022-04-08 RX ADMIN — NALBUPHINE HYDROCHLORIDE 5 MG: 10 INJECTION, SOLUTION INTRAMUSCULAR; INTRAVENOUS; SUBCUTANEOUS at 13:49

## 2022-04-08 RX ADMIN — BUTORPHANOL TARTRATE 2 MG: 1 INJECTION, SOLUTION INTRAMUSCULAR; INTRAVENOUS at 11:05

## 2022-04-08 RX ADMIN — SALINE LAXATIVE 1 ENEMA: 7; 19 ENEMA RECTAL at 18:09

## 2022-04-08 RX ADMIN — SODIUM CHLORIDE, POTASSIUM CHLORIDE, SODIUM LACTATE AND CALCIUM CHLORIDE: 600; 310; 30; 20 INJECTION, SOLUTION INTRAVENOUS at 11:28

## 2022-04-08 RX ADMIN — SODIUM CHLORIDE, SODIUM LACTATE, POTASSIUM CHLORIDE, AND CALCIUM CHLORIDE 500 ML: .6; .31; .03; .02 INJECTION, SOLUTION INTRAVENOUS at 08:00

## 2022-04-08 RX ADMIN — NALBUPHINE HYDROCHLORIDE 5 MG: 10 INJECTION, SOLUTION INTRAMUSCULAR; INTRAVENOUS; SUBCUTANEOUS at 07:57

## 2022-04-08 ASSESSMENT — PAIN SCALES - GENERAL
PAINLEVEL_OUTOF10: 9
PAINLEVEL_OUTOF10: 7
PAINLEVEL_OUTOF10: 4
PAINLEVEL_OUTOF10: 10
PAINLEVEL_OUTOF10: 7
PAINLEVEL_OUTOF10: 10

## 2022-04-08 NOTE — PROGRESS NOTES
Dr Loki Christie here on unit to assess patient. Patient reports small amount of stool after enema but still having pain at this time.  Patient to stay over night for continued monitoring

## 2022-04-08 NOTE — PROGRESS NOTES
Patient arrived to unit via squad c/o severe abdominal pain. Patient placed in bed. GBS, FFN and SVE performed. Monitors placed.

## 2022-04-08 NOTE — PROGRESS NOTES
Patient states pain is in her abdomen,states I don't think i'm in labor. ''Patient unable to lay on her back. Patient up to void

## 2022-04-08 NOTE — CONSULTS
Emotionally Abused: Not on file    Physically Abused: Not on file    Sexually Abused: Not on file   Housing Stability:     Unable to Pay for Housing in the Last Year: Not on file    Number of Places Lived in the Last Year: Not on file    Unstable Housing in the Last Year: Not on file       The patient has a family history that is negative for severe cardiovascular or respiratory issues, negative for reaction to anesthesia. Recent Labs     04/08/22  0820   WBC 17.3*   HGB 11.2*   HCT 34.8   MCV 88.1          Recent Labs     04/08/22  0820      K 3.9   CO2 17*   BUN 8   CREATININE 0.4*       Recent Labs     04/08/22  0820   PROT 6.5       No intake or output data in the 24 hours ending 04/08/22 1402    I have reviewed relevant labs from this admission and interpretation is included in my assessment and plan      Review of Systems  A complete 10 system review was performed and are otherwise negative unless mentioned in the above HPI. Specific negatives are listed below but may not include all those reviewed.     General ROS: negative obtundation, AMS  ENT ROS: negative rhinorrhea, epistaxis  Allergy and Immunology ROS: negative itchy/watery eyes or nasal congestion  Hematological and Lymphatic ROS: negative spontaneous bleeding or bruising  Endocrine ROS: negative  lethargy, mood swings, palpitations or polydipsia/polyuria  Respiratory ROS: negative sputum changes, stridor, tachypnea or wheezing  Cardiovascular ROS: negative for - loss of consciousness, murmur or orthopnea  Gastrointestinal ROS: negative for - hematochezia or hematemesis  Genito-Urinary ROS: negative for -  genital discharge or hematuria  Musculoskeletal ROS: negative for - focal weakness, gangrene  Psych/Neuro ROS: negative for - visual or auditory hallucinations, suicidal ideation      Physical exam:   BP (!) 107/57   Pulse 105   Temp 97.8 °F (36.6 °C) (Oral)   Resp 18   Ht 5' 9\" (1.753 m)   Wt 240 lb (108.9 kg)   BMI 35.44 kg/m²   General appearance:  NAD, appears stated age  Head: NCAT, PERRLA, EOMI, red conjunctiva  Neck: supple, no masses, trachea midline  Lungs: Equal chest rise bilateral, no retractions, no wheezing  Heart: Reg rate  Abdomen: soft, appropriate distention for gestational age, nontender when palpated all 4 quadrants, no peritonitis  Skin; warm and dry, no cyanosis  Gu: no cva tenderness  Extremities: atraumatic, no focal motor deficits, no open wounds  Psych: No tremor, visual hallucinations        Radiology: I reviewed relevant abdominal imaging from this admission and that available in the EMR including right upper quadrant ultrasound with no evidence of gallbladder disease or stones    Assessment:  Tawny Olsen is a 30 y.o. female with abdominal pain, 32 weeks pregnant    Patient Active Problem List   Diagnosis    Incomplete     Right kidney stone    Pyelonephritis of right kidney    39 weeks gestation of pregnancy     (spontaneous vaginal delivery)    Back pain affecting pregnancy in third trimester    Abdominal pain       Plan:  Highly doubt any acute GI intra-abdominal pathology, no evidence of acute appendicitis on physical exam as she is not tender with palpation  Pain is more associated with fetal movement  No plans for surgical intervention  Should she develop any evidence of fever or sepsis may consider an MRI to rule out acute appendicitis at this time nothing is very unlikely  Okay for diet as tolerated  Time spent reviewing past medical, surgical, social and family history, vitals, nursing assessment and images. Time spent face to face with patient and family counciling and discussing care exceeded 50% of the time of the consult. Additional time spent reviewing images and labs, discussing case with nursing, support staff and other physicians; as well as coordinating care. NOTE: This report, in part or full, may have been transcribed using voice recognition software.  Every effort was made to ensure accuracy; however, inadvertent computerized transcription errors may be present. Please excuse any transcriptional grammatical or spelling errors that may have escaped my editorial review.       Physician Signature: Electronically signed by Dr. Cyrus Mantilla  [phone] (p)

## 2022-04-09 VITALS
RESPIRATION RATE: 18 BRPM | HEIGHT: 69 IN | TEMPERATURE: 97.6 F | BODY MASS INDEX: 35.55 KG/M2 | WEIGHT: 240 LBS | HEART RATE: 100 BPM | SYSTOLIC BLOOD PRESSURE: 115 MMHG | DIASTOLIC BLOOD PRESSURE: 58 MMHG

## 2022-04-09 PROCEDURE — 99212 OFFICE O/P EST SF 10 MIN: CPT

## 2022-04-09 PROCEDURE — G0378 HOSPITAL OBSERVATION PER HR: HCPCS

## 2022-04-09 PROCEDURE — 6370000000 HC RX 637 (ALT 250 FOR IP): Performed by: OBSTETRICS & GYNECOLOGY

## 2022-04-09 RX ADMIN — ACETAMINOPHEN 650 MG: 325 TABLET ORAL at 07:00

## 2022-04-09 ASSESSMENT — PAIN SCALES - GENERAL: PAINLEVEL_OUTOF10: 7

## 2022-04-09 NOTE — H&P
Subjective:      Melissa Sharp is an 30 y.o. female at 35 and 3/7 weeks gestation presenting with intermittent pelvic pain radiating to the whole abdomen. Patient reports the pain is starting in the upper abdomen first.  Denies bleeding or fluid leak. Reports good fetal movements. .She is also complaining of contractions every 20 minutes lasting few seconds. Other associated symptoms include low back pain. Fetal Movement: normal.  Past Medical History:   Diagnosis Date    28 weeks gestation of pregnancy 3/12/2020    35 weeks gestation of pregnancy 2020    39 weeks gestation of pregnancy 2020    Abnormal Pap smear of cervix     Chronic kidney disease     Knee effusion     Mental disorder     Postpartum depression     Pyelonephritis of right kidney 3/20/2020     (spontaneous vaginal delivery) 2020    Urinary tract infection in mother during second trimester of pregnancy 3/20/2020       Review of Systems  Pertinent items are noted in HPI. Objective:      BP (!) 115/58   Pulse 100   Temp 97.6 °F (36.4 °C) (Oral)   Resp 18   Ht 5' 9\" (1.753 m)   Wt 240 lb (108.9 kg)   BMI 35.44 kg/m²   Blood pressure (!) 115/58, pulse 100, temperature 97.6 °F (36.4 °C), temperature source Oral, resp. rate 18, height 5' 9\" (1.753 m), weight 240 lb (108.9 kg), unknown if currently breastfeeding. General:   alert, appears stated age and cooperative   Cervix:   closed.    FHT:   140 BPM     Lab Review    CBC:   Lab Results   Component Value Date    WBC 17.3 2022    RBC 3.95 2022    HGB 11.2 2022    HCT 34.8 2022    MCV 88.1 2022    MCH 28.4 2022    MCHC 32.2 2022    RDW 13.0 2022     2022    MPV 12.0 2022     CMP:    Lab Results   Component Value Date     2022    K 3.9 2022     2022    CO2 17 2022    BUN 8 2022    CREATININE 0.4 2022    GFRAA >60 2022    LABGLOM >60 2022 GLUCOSE 104 04/08/2022    GLUCOSE 92 11/23/2010    PROT 6.5 04/08/2022    LABALBU 3.4 04/08/2022    LABALBU 4.5 11/23/2010    CALCIUM 8.8 04/08/2022    BILITOT <0.2 04/08/2022    ALKPHOS 92 04/08/2022    AST 12 04/08/2022    ALT 7 04/08/2022     U/A:    Lab Results   Component Value Date    COLORU Yellow 04/08/2022    PHUR 6.5 04/08/2022    LABCAST FEW 11/14/2016    WBCUA NONE 04/08/2022    WBCUA 2-5 11/23/2010    RBCUA 0-1 04/08/2022    RBCUA 0-1 11/23/2010    MUCUS Present 06/05/2019    TRICHOMONAS RARE 07/05/2019    BACTERIA NONE SEEN 04/08/2022    CLARITYU Clear 04/08/2022    SPECGRAV 1.020 04/08/2022    LEUKOCYTESUR Negative 04/08/2022    UROBILINOGEN 0.2 04/08/2022    BILIRUBINUR Negative 04/08/2022    BILIRUBINUR NEGATIVE 11/23/2010    BLOODU TRACE 04/08/2022    GLUCOSEU Negative 04/08/2022    GLUCOSEU NEGATIVE 11/23/2010    AMORPHOUS MODERATE 04/29/2020          Assessment:     33 weeks and 3-day pregnancy  Nonspecific abdominal pain    Plan:     CBC, CMP, urinalysis  IV fluids  Supportive therapy  Gallbladder ultrasound  Consult general surgery    Clayton Head MD,MD,4/9/2022 8:16 AM

## 2022-04-09 NOTE — PROGRESS NOTES
RN in room at [id number], ultrasound adjusted. Patient resting comfortably. Pain assessed, patient states decreased level of pain and frequency.  0800 patient sitting up for breakfast.

## 2022-04-09 NOTE — PROGRESS NOTES
Plan of care for night discussed. Patient ambulated In hallway and in room stated the pain was coming back in LUQ and her back. KPAD provided and patient repositioned to right side. EFM applied.  Call light with in reach

## 2022-04-09 NOTE — PROGRESS NOTES
Dr Casi Clarke updated on patient;s pain and BM. Patient stated that she has a ride coming to get her.  Patient ok for discharge per Dr Casi Clarke.

## 2022-04-11 LAB — GROUP B STREP CULTURE: NORMAL

## 2022-05-14 ENCOUNTER — HOSPITAL ENCOUNTER (INPATIENT)
Age: 37
LOS: 2 days | Discharge: HOME | DRG: 560 | End: 2022-05-16
Attending: OBSTETRICS & GYNECOLOGY | Admitting: OBSTETRICS & GYNECOLOGY
Payer: COMMERCIAL

## 2022-05-14 ENCOUNTER — ANESTHESIA EVENT (OUTPATIENT)
Dept: LABOR AND DELIVERY | Age: 37
DRG: 560 | End: 2022-05-14
Payer: COMMERCIAL

## 2022-05-14 ENCOUNTER — ANESTHESIA (OUTPATIENT)
Dept: LABOR AND DELIVERY | Age: 37
DRG: 560 | End: 2022-05-14
Payer: COMMERCIAL

## 2022-05-14 PROBLEM — O26.93 COMPLICATION OF PREGNANCY IN THIRD TRIMESTER: Status: ACTIVE | Noted: 2022-05-14

## 2022-05-14 PROBLEM — Z34.90 TERM PREGNANCY: Status: ACTIVE | Noted: 2022-05-14

## 2022-05-14 PROBLEM — Z3A.38 38 WEEKS GESTATION OF PREGNANCY: Status: ACTIVE | Noted: 2020-06-19

## 2022-05-14 LAB
ABO/RH: NORMAL
ALBUMIN SERPL-MCNC: 3.4 G/DL (ref 3.5–5.2)
ALP BLD-CCNC: 126 U/L (ref 35–104)
ALT SERPL-CCNC: <5 U/L (ref 0–32)
AMPHETAMINE SCREEN, URINE: NOT DETECTED
ANION GAP SERPL CALCULATED.3IONS-SCNC: 12 MMOL/L (ref 7–16)
ANTIBODY SCREEN: NORMAL
AST SERPL-CCNC: 12 U/L (ref 0–31)
BACTERIA: ABNORMAL /HPF
BARBITURATE SCREEN URINE: NOT DETECTED
BENZODIAZEPINE SCREEN, URINE: NOT DETECTED
BILIRUB SERPL-MCNC: 0.3 MG/DL (ref 0–1.2)
BILIRUBIN URINE: NEGATIVE
BLOOD, URINE: ABNORMAL
BUN BLDV-MCNC: 9 MG/DL (ref 6–20)
CALCIUM SERPL-MCNC: 8.8 MG/DL (ref 8.6–10.2)
CANNABINOID SCREEN URINE: NOT DETECTED
CHLORIDE BLD-SCNC: 104 MMOL/L (ref 98–107)
CLARITY: ABNORMAL
CO2: 18 MMOL/L (ref 22–29)
COCAINE METABOLITE SCREEN URINE: NOT DETECTED
COLOR: ABNORMAL
CREAT SERPL-MCNC: 0.4 MG/DL (ref 0.5–1)
EPITHELIAL CELLS, UA: ABNORMAL /HPF
FENTANYL SCREEN, URINE: NOT DETECTED
GFR AFRICAN AMERICAN: >60
GFR NON-AFRICAN AMERICAN: >60 ML/MIN/1.73
GLUCOSE BLD-MCNC: 115 MG/DL (ref 74–99)
GLUCOSE URINE: NEGATIVE MG/DL
HCT VFR BLD CALC: 35.5 % (ref 34–48)
HEMOGLOBIN: 11.5 G/DL (ref 11.5–15.5)
KETONES, URINE: ABNORMAL MG/DL
LEUKOCYTE ESTERASE, URINE: NEGATIVE
Lab: NORMAL
MCH RBC QN AUTO: 27.8 PG (ref 26–35)
MCHC RBC AUTO-ENTMCNC: 32.4 % (ref 32–34.5)
MCV RBC AUTO: 86 FL (ref 80–99.9)
METHADONE SCREEN, URINE: NOT DETECTED
NITRITE, URINE: NEGATIVE
OPIATE SCREEN URINE: NOT DETECTED
OXYCODONE URINE: NOT DETECTED
PDW BLD-RTO: 14 FL (ref 11.5–15)
PH UA: 6 (ref 5–9)
PHENCYCLIDINE SCREEN URINE: NOT DETECTED
PLATELET # BLD: 156 E9/L (ref 130–450)
PMV BLD AUTO: 12.4 FL (ref 7–12)
POTASSIUM SERPL-SCNC: 3.8 MMOL/L (ref 3.5–5)
PROTEIN UA: NEGATIVE MG/DL
RBC # BLD: 4.13 E12/L (ref 3.5–5.5)
RBC UA: ABNORMAL /HPF (ref 0–2)
SODIUM BLD-SCNC: 134 MMOL/L (ref 132–146)
SPECIFIC GRAVITY UA: 1.02 (ref 1–1.03)
TOTAL PROTEIN: 6.7 G/DL (ref 6.4–8.3)
UROBILINOGEN, URINE: 0.2 E.U./DL
WBC # BLD: 16.2 E9/L (ref 4.5–11.5)
WBC UA: ABNORMAL /HPF (ref 0–5)

## 2022-05-14 PROCEDURE — 81001 URINALYSIS AUTO W/SCOPE: CPT

## 2022-05-14 PROCEDURE — 80053 COMPREHEN METABOLIC PANEL: CPT

## 2022-05-14 PROCEDURE — 86901 BLOOD TYPING SEROLOGIC RH(D): CPT

## 2022-05-14 PROCEDURE — 2500000003 HC RX 250 WO HCPCS: Performed by: ANESTHESIOLOGY

## 2022-05-14 PROCEDURE — 7200000001 HC VAGINAL DELIVERY

## 2022-05-14 PROCEDURE — 85027 COMPLETE CBC AUTOMATED: CPT

## 2022-05-14 PROCEDURE — 1220000001 HC SEMI PRIVATE L&D R&B

## 2022-05-14 PROCEDURE — 86900 BLOOD TYPING SEROLOGIC ABO: CPT

## 2022-05-14 PROCEDURE — 86850 RBC ANTIBODY SCREEN: CPT

## 2022-05-14 PROCEDURE — 6370000000 HC RX 637 (ALT 250 FOR IP): Performed by: OBSTETRICS & GYNECOLOGY

## 2022-05-14 PROCEDURE — 80307 DRUG TEST PRSMV CHEM ANLYZR: CPT

## 2022-05-14 PROCEDURE — 3700000025 EPIDURAL BLOCK: Performed by: ANESTHESIOLOGY

## 2022-05-14 PROCEDURE — 10907ZC DRAINAGE OF AMNIOTIC FLUID, THERAPEUTIC FROM PRODUCTS OF CONCEPTION, VIA NATURAL OR ARTIFICIAL OPENING: ICD-10-PCS | Performed by: OBSTETRICS & GYNECOLOGY

## 2022-05-14 PROCEDURE — 36415 COLL VENOUS BLD VENIPUNCTURE: CPT

## 2022-05-14 PROCEDURE — 2580000003 HC RX 258: Performed by: OBSTETRICS & GYNECOLOGY

## 2022-05-14 PROCEDURE — 59050 FETAL MONITOR W/REPORT: CPT

## 2022-05-14 PROCEDURE — 6360000002 HC RX W HCPCS: Performed by: OBSTETRICS & GYNECOLOGY

## 2022-05-14 PROCEDURE — 6360000002 HC RX W HCPCS: Performed by: ANESTHESIOLOGY

## 2022-05-14 RX ORDER — ONDANSETRON 2 MG/ML
4 INJECTION INTRAMUSCULAR; INTRAVENOUS EVERY 6 HOURS PRN
Status: DISCONTINUED | OUTPATIENT
Start: 2022-05-14 | End: 2022-05-14 | Stop reason: SDUPTHER

## 2022-05-14 RX ORDER — NALOXONE HYDROCHLORIDE 0.4 MG/ML
INJECTION, SOLUTION INTRAMUSCULAR; INTRAVENOUS; SUBCUTANEOUS PRN
Status: DISCONTINUED | OUTPATIENT
Start: 2022-05-14 | End: 2022-05-14

## 2022-05-14 RX ORDER — SODIUM CHLORIDE 0.9 % (FLUSH) 0.9 %
5-40 SYRINGE (ML) INJECTION EVERY 12 HOURS SCHEDULED
Status: DISCONTINUED | OUTPATIENT
Start: 2022-05-14 | End: 2022-05-14

## 2022-05-14 RX ORDER — SODIUM CHLORIDE 0.9 % (FLUSH) 0.9 %
5-40 SYRINGE (ML) INJECTION PRN
Status: DISCONTINUED | OUTPATIENT
Start: 2022-05-14 | End: 2022-05-14

## 2022-05-14 RX ORDER — LANOLIN ALCOHOL/MO/W.PET/CERES
325 CREAM (GRAM) TOPICAL 2 TIMES DAILY WITH MEALS
Status: DISCONTINUED | OUTPATIENT
Start: 2022-05-15 | End: 2022-05-16 | Stop reason: HOSPADM

## 2022-05-14 RX ORDER — IBUPROFEN 800 MG/1
800 TABLET ORAL EVERY 6 HOURS PRN
Status: DISCONTINUED | OUTPATIENT
Start: 2022-05-14 | End: 2022-05-16 | Stop reason: HOSPADM

## 2022-05-14 RX ORDER — SODIUM CHLORIDE 9 MG/ML
25 INJECTION, SOLUTION INTRAVENOUS PRN
Status: DISCONTINUED | OUTPATIENT
Start: 2022-05-14 | End: 2022-05-14

## 2022-05-14 RX ORDER — ONDANSETRON 2 MG/ML
4 INJECTION INTRAMUSCULAR; INTRAVENOUS EVERY 6 HOURS PRN
Status: DISCONTINUED | OUTPATIENT
Start: 2022-05-14 | End: 2022-05-14

## 2022-05-14 RX ORDER — LIDOCAINE HYDROCHLORIDE 10 MG/ML
INJECTION, SOLUTION INFILTRATION; PERINEURAL
Status: DISCONTINUED
Start: 2022-05-14 | End: 2022-05-14

## 2022-05-14 RX ORDER — SODIUM CHLORIDE, SODIUM LACTATE, POTASSIUM CHLORIDE, AND CALCIUM CHLORIDE .6; .31; .03; .02 G/100ML; G/100ML; G/100ML; G/100ML
1000 INJECTION, SOLUTION INTRAVENOUS PRN
Status: DISCONTINUED | OUTPATIENT
Start: 2022-05-14 | End: 2022-05-14

## 2022-05-14 RX ORDER — FERROUS SULFATE 325(65) MG
325 TABLET ORAL 2 TIMES DAILY WITH MEALS
Status: DISCONTINUED | OUTPATIENT
Start: 2022-05-15 | End: 2022-05-14 | Stop reason: CLARIF

## 2022-05-14 RX ORDER — ONDANSETRON 4 MG/1
8 TABLET, ORALLY DISINTEGRATING ORAL EVERY 8 HOURS PRN
Status: DISCONTINUED | OUTPATIENT
Start: 2022-05-14 | End: 2022-05-16 | Stop reason: HOSPADM

## 2022-05-14 RX ORDER — SODIUM CHLORIDE, SODIUM LACTATE, POTASSIUM CHLORIDE, CALCIUM CHLORIDE 600; 310; 30; 20 MG/100ML; MG/100ML; MG/100ML; MG/100ML
INJECTION, SOLUTION INTRAVENOUS CONTINUOUS
Status: DISCONTINUED | OUTPATIENT
Start: 2022-05-14 | End: 2022-05-14

## 2022-05-14 RX ORDER — MODIFIED LANOLIN
OINTMENT (GRAM) TOPICAL PRN
Status: DISCONTINUED | OUTPATIENT
Start: 2022-05-14 | End: 2022-05-16 | Stop reason: HOSPADM

## 2022-05-14 RX ORDER — EPHEDRINE SULFATE/0.9% NACL/PF 50 MG/5 ML
10 SYRINGE (ML) INTRAVENOUS PRN
Status: DISCONTINUED | OUTPATIENT
Start: 2022-05-14 | End: 2022-05-14

## 2022-05-14 RX ORDER — DOCUSATE SODIUM 100 MG/1
100 CAPSULE, LIQUID FILLED ORAL 2 TIMES DAILY
Status: DISCONTINUED | OUTPATIENT
Start: 2022-05-14 | End: 2022-05-16 | Stop reason: HOSPADM

## 2022-05-14 RX ORDER — SODIUM CHLORIDE, SODIUM LACTATE, POTASSIUM CHLORIDE, AND CALCIUM CHLORIDE .6; .31; .03; .02 G/100ML; G/100ML; G/100ML; G/100ML
500 INJECTION, SOLUTION INTRAVENOUS PRN
Status: DISCONTINUED | OUTPATIENT
Start: 2022-05-14 | End: 2022-05-14

## 2022-05-14 RX ORDER — BUTORPHANOL TARTRATE 1 MG/ML
1 INJECTION, SOLUTION INTRAMUSCULAR; INTRAVENOUS
Status: DISCONTINUED | OUTPATIENT
Start: 2022-05-14 | End: 2022-05-14

## 2022-05-14 RX ADMIN — DOCUSATE SODIUM 100 MG: 100 CAPSULE, LIQUID FILLED ORAL at 21:10

## 2022-05-14 RX ADMIN — Medication 2 MILLI-UNITS/MIN: at 09:23

## 2022-05-14 RX ADMIN — Medication 15 ML/HR: at 13:23

## 2022-05-14 RX ADMIN — BENZOCAINE AND LEVOMENTHOL: 200; 5 SPRAY TOPICAL at 17:18

## 2022-05-14 RX ADMIN — ONDANSETRON 4 MG: 2 INJECTION INTRAMUSCULAR; INTRAVENOUS at 11:08

## 2022-05-14 RX ADMIN — IBUPROFEN 800 MG: 800 TABLET, FILM COATED ORAL at 17:19

## 2022-05-14 RX ADMIN — SODIUM CHLORIDE, POTASSIUM CHLORIDE, SODIUM LACTATE AND CALCIUM CHLORIDE 1000 ML: 600; 310; 30; 20 INJECTION, SOLUTION INTRAVENOUS at 12:45

## 2022-05-14 RX ADMIN — BUTORPHANOL TARTRATE 1 MG: 1 INJECTION, SOLUTION INTRAMUSCULAR; INTRAVENOUS at 11:08

## 2022-05-14 RX ADMIN — Medication 5 ML: at 13:27

## 2022-05-14 RX ADMIN — IBUPROFEN 800 MG: 800 TABLET, FILM COATED ORAL at 23:13

## 2022-05-14 RX ADMIN — SODIUM CHLORIDE, POTASSIUM CHLORIDE, SODIUM LACTATE AND CALCIUM CHLORIDE: 600; 310; 30; 20 INJECTION, SOLUTION INTRAVENOUS at 09:22

## 2022-05-14 RX ADMIN — Medication 10 ML: at 13:20

## 2022-05-14 RX ADMIN — Medication: at 17:18

## 2022-05-14 ASSESSMENT — PAIN DESCRIPTION - PROGRESSION: CLINICAL_PROGRESSION: GRADUALLY IMPROVING

## 2022-05-14 ASSESSMENT — PAIN DESCRIPTION - ORIENTATION
ORIENTATION: UPPER;MID
ORIENTATION: MID;LOWER

## 2022-05-14 ASSESSMENT — PAIN DESCRIPTION - LOCATION
LOCATION: BACK;ABDOMEN
LOCATION: ABDOMEN
LOCATION: ABDOMEN;HEAD

## 2022-05-14 ASSESSMENT — PAIN DESCRIPTION - DESCRIPTORS
DESCRIPTORS: CRAMPING
DESCRIPTORS: CRAMPING;THROBBING
DESCRIPTORS: ACHING;BURNING;CRAMPING

## 2022-05-14 ASSESSMENT — PAIN - FUNCTIONAL ASSESSMENT
PAIN_FUNCTIONAL_ASSESSMENT: ACTIVITIES ARE NOT PREVENTED
PAIN_FUNCTIONAL_ASSESSMENT: ACTIVITIES ARE NOT PREVENTED

## 2022-05-14 ASSESSMENT — LIFESTYLE VARIABLES: SMOKING_STATUS: 1

## 2022-05-14 ASSESSMENT — PAIN SCALES - GENERAL
PAINLEVEL_OUTOF10: 8
PAINLEVEL_OUTOF10: 6
PAINLEVEL_OUTOF10: 10

## 2022-05-14 NOTE — PROGRESS NOTES
[id number] 38w4d pt arrived via squad c/o ctx starting this morning. EFM/Foyil on. Denies LOF/bleeding. Perceives fetal movement.  Call light in reach

## 2022-05-14 NOTE — H&P
Subjective:      Jasmyne Currie is an 30 y.o. female at 45 and 4/7 weeks gestation presenting with   Chief Complaint   Patient presents with    Contractions   Patient was brought to labor and delivery by EMS. Denies bleeding or fluid leak. Rates her contractions as 8 out of 10. .She is also complaining of contractions every 5 minutes lasting 30 seconds. Other associated symptoms include low back pain. Fetal Movement: normal.  Past Medical History:   Diagnosis Date    28 weeks gestation of pregnancy 3/12/2020    35 weeks gestation of pregnancy 2020    39 weeks gestation of pregnancy 2020    Abnormal Pap smear of cervix     Chronic kidney disease     Knee effusion     Mental disorder     Postpartum depression     Pyelonephritis of right kidney 3/20/2020     (spontaneous vaginal delivery) 2020    Urinary tract infection in mother during second trimester of pregnancy 3/20/2020       Review of Systems  Pertinent items are noted in HPI. Objective:      /77   Pulse 114   Temp 98.1 °F (36.7 °C) (Oral)   Resp 18   Blood pressure 127/77, pulse 114, temperature 98.1 °F (36.7 °C), temperature source Oral, resp. rate 18, unknown if currently breastfeeding. General:   alert, appears stated age and cooperative   Cervix:   3 cm dilated, 70%, vertex, S- 2.    FHT:   135 BPM     Lab Review    CBC:   Lab Results   Component Value Date    WBC 17.3 2022    RBC 3.95 2022    HGB 11.2 2022    HCT 34.8 2022    MCV 88.1 2022    MCH 28.4 2022    MCHC 32.2 2022    RDW 13.0 2022     2022    MPV 12.0 2022     CMP:    Lab Results   Component Value Date     2022    K 3.9 2022     2022    CO2 17 2022    BUN 8 2022    CREATININE 0.4 2022    GFRAA >60 2022    LABGLOM >60 2022    GLUCOSE 104 2022    GLUCOSE 92 2010    PROT 6.5 2022    LABALBU 3.4 2022 LABALBU 4.5 11/23/2010    CALCIUM 8.8 04/08/2022    BILITOT <0.2 04/08/2022    ALKPHOS 92 04/08/2022    AST 12 04/08/2022    ALT 7 04/08/2022     U/A:    Lab Results   Component Value Date    COLORU Yellow 04/08/2022    PHUR 6.5 04/08/2022    LABCAST FEW 11/14/2016    WBCUA NONE 04/08/2022    WBCUA 2-5 11/23/2010    RBCUA 0-1 04/08/2022    RBCUA 0-1 11/23/2010    MUCUS Present 06/05/2019    TRICHOMONAS RARE 07/05/2019    BACTERIA NONE SEEN 04/08/2022    CLARITYU Clear 04/08/2022    SPECGRAV 1.020 04/08/2022    LEUKOCYTESUR Negative 04/08/2022    UROBILINOGEN 0.2 04/08/2022    BILIRUBINUR Negative 04/08/2022    BILIRUBINUR NEGATIVE 11/23/2010    BLOODU TRACE 04/08/2022    GLUCOSEU Negative 04/08/2022    GLUCOSEU NEGATIVE 11/23/2010    AMORPHOUS MODERATE 04/29/2020          Assessment:     Early labor  Grand multipara  Negative GBS    Plan:     Pitocin augmentation, expectant management, epidural if desired    Clayton Head MD,MD,5/14/2022 9:22 AM

## 2022-05-14 NOTE — ANESTHESIA PROCEDURE NOTES
Epidural Block    Patient location during procedure: OB  Start time: 5/14/2022 1:00 PM  End time: 5/14/2022 1:28 PM  Reason for block: labor epidural  Staffing  Performed: resident/CRNA   Anesthesiologist: Dinesh Calhoun MD  Resident/CRNA: TREVOR Zaragoza CRNA  Preanesthetic Checklist  Completed: patient identified, IV checked, site marked, risks and benefits discussed, surgical consent, monitors and equipment checked, pre-op evaluation, timeout performed, anesthesia consent given, oxygen available and patient being monitored  Epidural  Patient position: sitting  Prep: Betadine  Patient monitoring: cardiac monitor, continuous pulse ox and frequent blood pressure checks  Approach: midline  Location: L3-4  Injection technique: NOREEN air  Provider prep: mask and sterile gloves  Needle  Needle type: Tuohy   Needle gauge: 18 G  Needle length: 2.5 in  Needle insertion depth: 9 cm  Catheter type: end hole  Catheter size: 20 G.   Catheter at skin depth: 15 cm  Test dose: negative  Assessment  Sensory level: T6  Hemodynamics: stable  Attempts: 1Outcomes: uncomplicated

## 2022-05-14 NOTE — PROGRESS NOTES
Dr. Josie Weaver updated of patient status via phone. Patient 3/50%/-2. Patient pitocin @ 10. Patient FHR and contraction frequency given.

## 2022-05-14 NOTE — PROGRESS NOTES
CRNA in room for procedure at 1300    Patient sitting at side of bed for epidural insertion at 1300    Epidural catheter placed at 1308    Test dose given at 1313  Epidural bolus given at 1320    Patient returned to semi-jackson's position in bed at 1315    Epidural pump programmed to continuously infused at [phone]

## 2022-05-14 NOTE — PROGRESS NOTES
Assumed care of pt for this shift. Positive fetal movement perceived by pt and audible on monitor. Pt denies any vaginal bleeding or leakage of fluid at this time. Pt resting. Plan of care discussed with pt. Pt verbalizes understanding without questions at this time. FHT monitored and assessed at least every 15 min while pt is in first stage of labor. Rest encouraged. Call light within reach.

## 2022-05-14 NOTE — PROGRESS NOTES
Skin to skin initiated mother with baby at 26. Baby alert, pink, respirations regular. Mother educated on safe skin to skin practices with proper position of baby and assurance of unobstructed airway; verbalized understanding at 26.

## 2022-05-14 NOTE — L&D DELIVERY NOTE
Department of Obstetrics and Gynecology  Spontaneous Vaginal Delivery Note         Pre-operative Diagnosis:  Term pregnancy, Spontaneous labor, Single fetus and Uncomplicated pregnancy    Post-operative Diagnosis:  Same + live female wt 7 #,13 oz  Procedure:  Spontaneous vaginal delivery    Surgeon:  John Heredia MD    Anesthesia:  epidural anesthesia    Estimated blood loss:  200    Specimen:  Placenta not sent to pathology     Cord blood sent Yes    Complications: Nuchal cord x1    Condition:  infant stable to general nursery and mother stable    Details of Procedure: The patient is a 30 y.o. female at 38w3d   OB History        15    Para   7    Term   7            AB   6    Living   7       SAB   6    IAB        Ectopic        Molar        Multiple   0    Live Births   7             who was admitted for active phase labor. She received the following interventions: ARBOW and IV Pitocin augmentation She was known to be GBS negative and did not receive antibiotic prophylaxis. The patient progressed well,did receive an epidural, became complete and started to push. After pushing for 2 x the fetal head was at the perineum, nose and mouth suctioned with bulb suction and the rest of the infant delivered atraumatically, placed on mother abdomen. Cord was clamped and cut and infant handed off to the waiting nurse for evaluation. The delivery of the placenta was spontaneous. The perineum and vagina were explored and no tears or lacerations were noted.

## 2022-05-14 NOTE — PROGRESS NOTES
Dr. Eufemia Kulkarni at the bedside AROM @2823. [facility] fluid noted. FSE placed and TOCO reapplied at this time. Patient has plans for an epidural and request for one @ [phone]. Bolus began at 1245.

## 2022-05-14 NOTE — PLAN OF CARE
Problem: Postpartum  Goal: Experiences normal postpartum course  Description:  Postpartum OB-Pregnancy care plan goal which identifies if the mother is experiencing a normal postpartum course  Outcome: Progressing  Goal: Appropriate maternal -  bonding  Description:  Postpartum OB-Pregnancy care plan goal which identifies if the mother and  are bonding appropriately  Outcome: Progressing

## 2022-05-14 NOTE — ANESTHESIA PRE PROCEDURE
Department of Anesthesiology  Preprocedure Note       Name:  Roya Cordial   Age:  30 y.o.  :  1985                                          MRN:  [id number]         Date:  2022      Surgeon: Sandra Ruvalcaba    Procedure: LABOR OPTIONS (JODY)  Medications prior to admission:   Prior to Admission medications    Medication Sig Start Date End Date Taking?  Authorizing Provider   Prenatal Vit-Fe Fumarate-FA (PRENATAL VITAMIN PO) Take 1 tablet by mouth daily    Historical Provider, MD   acetaminophen (TYLENOL) 500 MG tablet Take 500 mg by mouth every 6 hours as needed for Pain    Historical Provider, MD   albuterol sulfate  (90 Base) MCG/ACT inhaler Inhale 2 puffs into the lungs every 6 hours as needed for Wheezing 10/28/20   Albino Anne MD       Current medications:    Current Facility-Administered Medications   Medication Dose Route Frequency Provider Last Rate Last Admin    lactated ringers infusion   IntraVENous Continuous Amine ALISSA Head  mL/hr at 22 0922 New Bag at 22 0922    lactated ringers bolus  500 mL IntraVENous PRN Amine ALISSA Head MD        Or    lactated ringers bolus  1,000 mL IntraVENous PRN Amine ALISSA Head .9 mL/hr at 22 1245 1,000 mL at 22 1245    sodium chloride flush 0.9 % injection 5-40 mL  5-40 mL IntraVENous 2 times per day Clayton Head MD        sodium chloride flush 0.9 % injection 5-40 mL  5-40 mL IntraVENous PRKALLI Head MD        0.9 % sodium chloride infusion  25 mL IntraVENous PRN Clayton Head MD        oxytocin (PITOCIN) 30 units in 500 mL infusion  87.3 marlon-units/min IntraVENous Continuous PRN Amine ALISSA Head MD        And    oxytocin (PITOCIN) 30 units in 500 mL infusion  10 Units IntraVENous PRN Clayton Head MD        oxytocin (PITOCIN) 30 units in 500 mL infusion  1-20 marlon-units/min IntraVENous Continuous Amine ALISSA Head MD 14 mL/hr at 22 1249 14 marlon-units/min at 22 1249    lidocaine 1 % injection             butorphanol (STADOL) injection 1 mg  1 mg IntraVENous Q3H PRN Amine ALISSA Head MD   1 mg at 22 1108    naloxone 0.4 mg in 10 mL sodium chloride syringe   IntraVENous PRN Evy Holliday MD        ondansetron [facility]) injection 4 mg  4 mg IntraVENous Q6H PRN Evy Holliday MD   4 mg at 22 1108    fentaNYL 1.85mcg/ml and bupivacaine 0.1% 15ml syringe (Home Care) (OB) 15 mL epidural  15 mL Epidural Once Evy Holliday MD        fentaNYL 1.85mcg/ml and Bupivicaine 0.1% in 0.9% NS 135ml infusion (OB) epidural  15 mL/hr Epidural Continuous Evy Holliday MD        ePHEDrine injection 10 mg  10 mg IntraVENous PRN Evy Holliday MD           Allergies:     Allergies   Allergen Reactions    Penicillins Hives    Vicodin [Hydrocodone-Acetaminophen] Itching       Problem List:    Patient Active Problem List   Diagnosis Code    Incomplete  O03.4    Right kidney stone N20.0    Pyelonephritis of right kidney N12    39 weeks gestation of pregnancy Z3A.39     (spontaneous vaginal delivery) O80    Back pain affecting pregnancy in third trimester O99.891, M54.9    Abdominal pain R10.9    Pregnancy related condition M51.20    Complication of pregnancy in third trimester O26.80    Term pregnancy Z34.90       Past Medical History:        Diagnosis Date    28 weeks gestation of pregnancy 3/12/2020    35 weeks gestation of pregnancy 2020    39 weeks gestation of pregnancy 2020    Abnormal Pap smear of cervix     Chronic kidney disease     Knee effusion     Mental disorder     Postpartum depression     Pyelonephritis of right kidney 3/20/2020     (spontaneous vaginal delivery) 2020    Urinary tract infection in mother during second trimester of pregnancy 3/20/2020       Past Surgical History:        Procedure Laterality Date    DILATION AND CURETTAGE OF UTERUS N/A 2019 DILATATION AND CURETTAGE SUCTION performed by Reina Samuel MD at [address] History:    Social History     Tobacco Use    Smoking status: Current Every Day Smoker     Packs/day: 0.50     Years: 15.00     Pack years: 7.50     Types: Cigarettes    Smokeless tobacco: Current User   Substance Use Topics    Alcohol use: Not Currently     Comment: occ                                Ready to quit: Not Answered  Counseling given: Not Answered      Vital Signs (Current):   Vitals:    05/14/22 1103 05/14/22 1108 05/14/22 1130 05/14/22 1200   BP: (!) 141/69 136/70 (!) 122/59 (!) 105/53   Pulse: 108 105 100 96   Resp: 18 18 16 16   Temp:   36.7 °C (98 °F)    TempSrc:   Oral                                               BP Readings from Last 3 Encounters:   05/14/22 (!) 105/53   04/09/22 (!) 115/58   03/26/22 (!) 112/52       NPO Status: Time of last liquid consumption: 0000                        Time of last solid consumption: 0000                        Date of last liquid consumption: 05/14/22                        Date of last solid food consumption: 05/14/22    BMI:   Wt Readings from Last 3 Encounters:   04/08/22 240 lb (108.9 kg)   06/03/21 240 lb (108.9 kg)   05/04/21 226 lb (102.5 kg)     There is no height or weight on file to calculate BMI.    CBC:   Lab Results   Component Value Date    WBC 16.2 05/14/2022    RBC 4.13 05/14/2022    HGB 11.5 05/14/2022    HCT 35.5 05/14/2022    MCV 86.0 05/14/2022    RDW 14.0 05/14/2022     05/14/2022       CMP:   Lab Results   Component Value Date     05/14/2022    K 3.8 05/14/2022     05/14/2022    CO2 18 05/14/2022    BUN 9 05/14/2022    CREATININE 0.4 05/14/2022    GFRAA >60 05/14/2022    LABGLOM >60 05/14/2022    GLUCOSE 115 05/14/2022    GLUCOSE 92 11/23/2010    PROT 6.7 05/14/2022    CALCIUM 8.8 05/14/2022    BILITOT 0.3 05/14/2022    ALKPHOS 126 05/14/2022    AST 12 05/14/2022    ALT <5 05/14/2022       POC Tests: No results for input(s): POCGLU, POCNA, POCK, POCCL, POCBUN, POCHEMO, POCHCT in the last 72 hours. Coags: No results found for: PROTIME, INR, APTT    HCG (If Applicable):   Lab Results   Component Value Date    PREGTESTUR POSITIVE 10/28/2020    PREGSERUM NEGATIVE 01/05/2014        ABGs: No results found for: PHART, PO2ART, KLB2PWM, MWT6ISQ, BEART, D3QVKUOS     Type & Screen (If Applicable):  No results found for: LABABO, LABRH    Drug/Infectious Status (If Applicable):  No results found for: HIV, HEPCAB    COVID-19 Screening (If Applicable):   Lab Results   Component Value Date    COVID19 Not Detected 06/03/2021           Anesthesia Evaluation  Patient summary reviewed no history of anesthetic complications:   Airway: Mallampati: II  TM distance: >3 FB   Neck ROM: full  Mouth opening: > = 3 FB Dental: normal exam         Pulmonary: breath sounds clear to auscultation  (+) current smoker          Patient smoked on day of surgery. Cardiovascular:Negative CV ROS  Exercise tolerance: good (>4 METS),           Rhythm: regular  Rate: normal           Beta Blocker:  Not on Beta Blocker         Neuro/Psych:   (+) psychiatric history:             ROS comment: Hx PPD GI/Hepatic/Renal:   (+) renal disease: kidney stones,           Endo/Other:        (-) diabetes mellitus, hypothyroidism, hyperthyroidism, blood dyscrasia        Pt had no PAT visit       Abdominal:             Vascular: negative vascular ROS. Other Findings:           Anesthesia Plan      epidural     ASA 2             Anesthetic plan and risks discussed with patient.         Attending anesthesiologist reviewed and agrees with Preprocedure content              TREVOR Oconnell - CRNA   5/14/2022

## 2022-05-14 NOTE — PROGRESS NOTES
Birth of live vigorous female @ 26. Weight 7 pounds 13 ounces. Apgars 9/9. Postpartum recovery began @1520 and ends at 1720.

## 2022-05-15 LAB
HCT VFR BLD CALC: 30.6 % (ref 34–48)
HEMOGLOBIN: 9.9 G/DL (ref 11.5–15.5)

## 2022-05-15 PROCEDURE — 85014 HEMATOCRIT: CPT

## 2022-05-15 PROCEDURE — 1220000001 HC SEMI PRIVATE L&D R&B

## 2022-05-15 PROCEDURE — 6370000000 HC RX 637 (ALT 250 FOR IP): Performed by: OBSTETRICS & GYNECOLOGY

## 2022-05-15 PROCEDURE — 36415 COLL VENOUS BLD VENIPUNCTURE: CPT

## 2022-05-15 PROCEDURE — 85018 HEMOGLOBIN: CPT

## 2022-05-15 RX ORDER — ACETAMINOPHEN 325 MG/1
650 TABLET ORAL EVERY 4 HOURS PRN
Status: DISCONTINUED | OUTPATIENT
Start: 2022-05-15 | End: 2022-05-16 | Stop reason: HOSPADM

## 2022-05-15 RX ADMIN — DOCUSATE SODIUM 100 MG: 100 CAPSULE, LIQUID FILLED ORAL at 21:26

## 2022-05-15 RX ADMIN — ACETAMINOPHEN 650 MG: 325 TABLET ORAL at 17:19

## 2022-05-15 RX ADMIN — FERROUS SULFATE TAB EC 325 MG (65 MG FE EQUIVALENT) 325 MG: 325 (65 FE) TABLET DELAYED RESPONSE at 08:24

## 2022-05-15 RX ADMIN — IBUPROFEN 800 MG: 800 TABLET, FILM COATED ORAL at 14:49

## 2022-05-15 RX ADMIN — FERROUS SULFATE TAB EC 325 MG (65 MG FE EQUIVALENT) 325 MG: 325 (65 FE) TABLET DELAYED RESPONSE at 17:19

## 2022-05-15 RX ADMIN — IBUPROFEN 800 MG: 800 TABLET, FILM COATED ORAL at 08:24

## 2022-05-15 RX ADMIN — ACETAMINOPHEN 650 MG: 325 TABLET ORAL at 10:52

## 2022-05-15 RX ADMIN — ACETAMINOPHEN 650 MG: 325 TABLET ORAL at 21:26

## 2022-05-15 RX ADMIN — DOCUSATE SODIUM 100 MG: 100 CAPSULE, LIQUID FILLED ORAL at 08:24

## 2022-05-15 RX ADMIN — ACETAMINOPHEN 650 MG: 325 TABLET ORAL at 03:35

## 2022-05-15 ASSESSMENT — PAIN DESCRIPTION - LOCATION
LOCATION: ABDOMEN;BACK
LOCATION: ABDOMEN;BACK
LOCATION: ABDOMEN
LOCATION: ABDOMEN;BACK
LOCATION: ABDOMEN;BACK

## 2022-05-15 ASSESSMENT — PAIN DESCRIPTION - ORIENTATION
ORIENTATION: LOWER;MID
ORIENTATION: MID;LOWER

## 2022-05-15 ASSESSMENT — PAIN DESCRIPTION - DESCRIPTORS
DESCRIPTORS: ACHING;CRAMPING

## 2022-05-15 ASSESSMENT — PAIN SCALES - GENERAL
PAINLEVEL_OUTOF10: 6

## 2022-05-15 NOTE — PROGRESS NOTES
Assumed care of pt at this time from previous RN. Plan of care discussed. Pt verbalized understanding. Pt walking around room carrying infant. No concerns expressed at this time. Call light within reach.

## 2022-05-15 NOTE — PROGRESS NOTES
Assumed care of pt at this time from previous RN. Plan of care discussed. Pt verbalized understanding. No other concerns expressed at this time. Call light within reach.

## 2022-05-15 NOTE — PROGRESS NOTES
Department of Obstetrics and Gynecology  Labor and Delivery  Attending Post Partum Progress Note      SUBJECTIVE:  Doing well with no complaints  OBJECTIVE:      Vitals:  /68   Pulse 94   Temp 98.4 °F (36.9 °C) (Oral)   Resp 16   SpO2 99%   Breastfeeding Unknown     ABDOMEN:  Soft, well contracted uterus   Lochia: Normal  No calf tenderness    DATA:    CBC:    Lab Results   Component Value Date    WBC 16.2 05/14/2022    RBC 4.13 05/14/2022    HGB 9.9 05/15/2022    HCT 30.6 05/15/2022    MCV 86.0 05/14/2022    RDW 14.0 05/14/2022     05/14/2022       ASSESSMENT & PLAN:      PPD # 1    Continue current care. Discharge home tomorrow.

## 2022-05-15 NOTE — PROGRESS NOTES
Assuming care of patient. Report received from previous RN. Patient resting in bed. Plan of care discussed with patient; patient verbalizes understanding. Call light in reach.

## 2022-05-15 NOTE — PROGRESS NOTES
Assumed care of Patient with RN. Discussed plan of care. Morning medications given along with pain medication and intervention due to severity of pain. Patient using Heating and ice packs as alternate pain  Management options. No further concerns at this time. Call light in reach.

## 2022-05-15 NOTE — ANESTHESIA POSTPROCEDURE EVALUATION
Department of Anesthesiology  Postprocedure Note    Patient: Brandon Murguia  MRN: [id number]  YOB: 1985  Date of evaluation: 5/15/2022  Time:  8:27 AM     Procedure Summary     Date: 05/14/22 Room / Location:     Anesthesia Start: 1300 Anesthesia Stop: 7823    Procedure: Labor Analgesia Diagnosis:     Scheduled Providers:  Responsible Provider: Power Ardon MD    Anesthesia Type: epidural ASA Status: 2          Anesthesia Type: No value filed. Oneil Phase I:      Oneil Phase II:      Last vitals: Reviewed and per EMR flowsheets.        Anesthesia Post Evaluation    Patient location during evaluation: bedside  Patient participation: complete - patient participated  Level of consciousness: awake  Pain score: 2  Airway patency: patent  Nausea & Vomiting: no nausea  Complications: no  Cardiovascular status: blood pressure returned to baseline  Respiratory status: acceptable  Hydration status: euvolemic

## 2022-05-16 ENCOUNTER — HOSPITAL ENCOUNTER (EMERGENCY)
Age: 37
Discharge: HOME | End: 2022-05-16
Attending: EMERGENCY MEDICINE
Payer: COMMERCIAL

## 2022-05-16 VITALS
WEIGHT: 215 LBS | RESPIRATION RATE: 16 BRPM | HEART RATE: 100 BPM | HEIGHT: 69 IN | OXYGEN SATURATION: 99 % | BODY MASS INDEX: 31.84 KG/M2 | TEMPERATURE: 97.1 F | SYSTOLIC BLOOD PRESSURE: 130 MMHG | DIASTOLIC BLOOD PRESSURE: 79 MMHG

## 2022-05-16 VITALS
DIASTOLIC BLOOD PRESSURE: 70 MMHG | RESPIRATION RATE: 16 BRPM | TEMPERATURE: 98 F | SYSTOLIC BLOOD PRESSURE: 120 MMHG | HEART RATE: 87 BPM | OXYGEN SATURATION: 98 %

## 2022-05-16 DIAGNOSIS — H69.81 DYSFUNCTION OF RIGHT EUSTACHIAN TUBE: Primary | ICD-10-CM

## 2022-05-16 PROCEDURE — 6370000000 HC RX 637 (ALT 250 FOR IP): Performed by: OBSTETRICS & GYNECOLOGY

## 2022-05-16 PROCEDURE — 99283 EMERGENCY DEPT VISIT LOW MDM: CPT

## 2022-05-16 PROCEDURE — 6360000002 HC RX W HCPCS: Performed by: OBSTETRICS & GYNECOLOGY

## 2022-05-16 PROCEDURE — 90715 TDAP VACCINE 7 YRS/> IM: CPT | Performed by: OBSTETRICS & GYNECOLOGY

## 2022-05-16 PROCEDURE — 90471 IMMUNIZATION ADMIN: CPT | Performed by: OBSTETRICS & GYNECOLOGY

## 2022-05-16 RX ORDER — IBUPROFEN 800 MG/1
800 TABLET ORAL EVERY 6 HOURS PRN
Qty: 120 TABLET | Refills: 0 | Status: SHIPPED | OUTPATIENT
Start: 2022-05-16 | End: 2022-06-15

## 2022-05-16 RX ORDER — LORATADINE 10 MG/1
10 TABLET ORAL DAILY
Qty: 30 TABLET | Refills: 0 | Status: SHIPPED | OUTPATIENT
Start: 2022-05-16 | End: 2022-06-15

## 2022-05-16 RX ADMIN — TETANUS TOXOID, REDUCED DIPHTHERIA TOXOID AND ACELLULAR PERTUSSIS VACCINE, ADSORBED 0.5 ML: 5; 2.5; 8; 8; 2.5 SUSPENSION INTRAMUSCULAR at 08:35

## 2022-05-16 RX ADMIN — IBUPROFEN 800 MG: 800 TABLET, FILM COATED ORAL at 04:10

## 2022-05-16 RX ADMIN — DOCUSATE SODIUM 100 MG: 100 CAPSULE, LIQUID FILLED ORAL at 08:34

## 2022-05-16 RX ADMIN — FERROUS SULFATE TAB EC 325 MG (65 MG FE EQUIVALENT) 325 MG: 325 (65 FE) TABLET DELAYED RESPONSE at 08:34

## 2022-05-16 RX ADMIN — ACETAMINOPHEN 650 MG: 325 TABLET ORAL at 08:34

## 2022-05-16 ASSESSMENT — PAIN DESCRIPTION - FREQUENCY: FREQUENCY: CONTINUOUS

## 2022-05-16 ASSESSMENT — PAIN DESCRIPTION - ORIENTATION
ORIENTATION: LOWER
ORIENTATION: RIGHT
ORIENTATION: LOWER

## 2022-05-16 ASSESSMENT — PAIN SCALES - GENERAL
PAINLEVEL_OUTOF10: 10
PAINLEVEL_OUTOF10: 7
PAINLEVEL_OUTOF10: 5

## 2022-05-16 ASSESSMENT — ENCOUNTER SYMPTOMS
SINUS PRESSURE: 0
BACK PAIN: 0
VOMITING: 0
DIARRHEA: 0
SHORTNESS OF BREATH: 0
NAUSEA: 0
EYE REDNESS: 0
EYE DISCHARGE: 0
WHEEZING: 0
EYE PAIN: 0
ABDOMINAL DISTENTION: 0
SORE THROAT: 0
COUGH: 0

## 2022-05-16 ASSESSMENT — PAIN DESCRIPTION - LOCATION
LOCATION: ABDOMEN;BACK
LOCATION: ABDOMEN
LOCATION: EAR

## 2022-05-16 ASSESSMENT — PAIN - FUNCTIONAL ASSESSMENT: PAIN_FUNCTIONAL_ASSESSMENT: 0-10

## 2022-05-16 ASSESSMENT — PAIN DESCRIPTION - PAIN TYPE: TYPE: ACUTE PAIN

## 2022-05-16 ASSESSMENT — PAIN DESCRIPTION - DESCRIPTORS: DESCRIPTORS: THROBBING

## 2022-05-16 ASSESSMENT — PAIN DESCRIPTION - ONSET: ONSET: SUDDEN

## 2022-05-16 NOTE — PLAN OF CARE
Problem: Pain  Goal: Verbalizes/displays adequate comfort level or baseline comfort level  Outcome: Completed  Flowsheets (Taken 5/15/2022 2229 by Shade Baumann, RN)  Verbalizes/displays adequate comfort level or baseline comfort level: Assess pain using appropriate pain scale     Problem: Postpartum  Goal: Experiences normal postpartum course  Description:  Postpartum OB-Pregnancy care plan goal which identifies if the mother is experiencing a normal postpartum course  Outcome: Completed  Goal: Appropriate maternal -  bonding  Description:  Postpartum OB-Pregnancy care plan goal which identifies if the mother and  are bonding appropriately  Outcome: Completed  Goal: Establishment of infant feeding pattern  Description:  Postpartum OB-Pregnancy care plan goal which identifies if the mother is establishing a feeding pattern with their   Outcome: Completed  Goal: Incisions, wounds, or drain sites healing without S/S of infection  Outcome: Completed  Flowsheets (Taken 5/15/2022 2302 by Shade Baumann RN)  Incisions, Wounds, or Drain Sites Healing Without Sign and Symptoms of Infection: ADMISSION and DAILY: Assess and document risk factors for pressure ulcer development     Problem: Infection - Adult  Goal: Absence of infection at discharge  Outcome: Completed  Goal: Absence of infection during hospitalization  Outcome: Completed  Goal: Absence of fever/infection during anticipated neutropenic period  Outcome: Completed     Problem: Safety - Adult  Goal: Free from fall injury  Outcome: Completed     Problem: Discharge Planning  Goal: Discharge to home or other facility with appropriate resources  Outcome: Completed

## 2022-05-16 NOTE — PROGRESS NOTES
[facility] screening results were discussed with parent. Questions answered. Brochure given to parent. Advised to monitor developmental milestones and contact physician for any concerns.    Annalee Crowe

## 2022-05-16 NOTE — DISCHARGE SUMMARY
Obstetrical Discharge Form         Patients Name  Shaan Schafer    Gestational Age:  38w3d    Antepartum complications: none    Date of Delivery:   2022       3:14 PM      Type of Delivery:   Vaginal, Spontaneous [250]   Rupture Date/time:    2022      12:30 PM     Presentation:    Vertex [1]   Position:                      Anesthesia:    Epidural [254]     Feeding method:         Delivered By:   Wilder MAXWELL     Baby:       Information for the patient's :  Niles Coreas [facility] [[id number]]          Intrapartum complications: None  Postpartum complications: none  Discharge Date:   2022  Discharge Condition: Stable  Disposition:   Home    Plan:   Follow up    in 6 weeks

## 2022-05-16 NOTE — PROGRESS NOTES
Pt encouraged to walk out in the miller to get ice and water while a cooling pad was being set up for her abdomen instead of using multiple ice packs. Heating pad is being used on her back.

## 2022-05-16 NOTE — ED PROVIDER NOTES
The history is provided by the patient. Ear Problem  Location:  Right  Quality:  Aching  Severity:  Moderate  Onset quality:  Gradual  Duration:  2 weeks  Chronicity:  New  Associated symptoms: congestion    Associated symptoms: no cough, no diarrhea, no fever, no headaches, no rash, no sore throat and no vomiting         Review of Systems   Constitutional: Negative for chills and fever. HENT: Positive for congestion. Negative for ear pain, sinus pressure and sore throat. Eyes: Negative for pain, discharge and redness. Respiratory: Negative for cough, shortness of breath and wheezing. Cardiovascular: Negative for chest pain. Gastrointestinal: Negative for abdominal distention, diarrhea, nausea and vomiting. Genitourinary: Negative for dysuria and frequency. Musculoskeletal: Negative for arthralgias and back pain. Skin: Negative for rash and wound. Neurological: Negative for weakness and headaches. Hematological: Negative for adenopathy. All other systems reviewed and are negative. Physical Exam  Vitals and nursing note reviewed. Constitutional:       Appearance: She is well-developed. HENT:      Head: Normocephalic and atraumatic. Right Ear: Hearing and external ear normal. Tympanic membrane is retracted. Left Ear: Hearing and external ear normal. Tympanic membrane is retracted. Nose: Mucosal edema and congestion present. Mouth/Throat:      Pharynx: Uvula midline. Eyes:      General: Lids are normal.      Conjunctiva/sclera: Conjunctivae normal.      Pupils: Pupils are equal, round, and reactive to light. Cardiovascular:      Rate and Rhythm: Normal rate and regular rhythm. Heart sounds: Normal heart sounds. No murmur heard. Pulmonary:      Effort: Pulmonary effort is normal. No respiratory distress. Breath sounds: Normal breath sounds. No wheezing or rales.    Abdominal:      General: Bowel sounds are normal.      Palpations: Abdomen is soft. Abdomen is not rigid. Tenderness: There is no abdominal tenderness. There is no guarding or rebound. Musculoskeletal:      Cervical back: Normal range of motion and neck supple. Skin:     General: Skin is warm and dry. Findings: No abrasion or rash. Neurological:      Mental Status: She is alert and oriented to person, place, and time. GCS: GCS eye subscore is 4. GCS verbal subscore is 5. GCS motor subscore is 6. Cranial Nerves: No cranial nerve deficit. Sensory: No sensory deficit. Coordination: Coordination normal.      Gait: Gait normal.          Procedures     MDM          --------------------------------------------- PAST HISTORY ---------------------------------------------  Past Medical History:  has a past medical history of 28 weeks gestation of pregnancy, 35 weeks gestation of pregnancy, 38 weeks gestation of pregnancy, 39 weeks gestation of pregnancy, Abnormal Pap smear of cervix, Chronic kidney disease, Knee effusion, Mental disorder, Postpartum depression, Pyelonephritis of right kidney,  (spontaneous vaginal delivery), and Urinary tract infection in mother during second trimester of pregnancy. Past Surgical History:  has a past surgical history that includes Dilation and curettage of uterus (N/A, 2019). Social History:  reports that she has been smoking cigarettes. She has a 7.50 pack-year smoking history. She uses smokeless tobacco. She reports previous alcohol use. She reports that she does not use drugs.     Family History: family history includes Allergy (Severe) in her nephew; Arthritis in her father and maternal grandfather; Breast Cancer in her mother; Cancer in her mother and paternal grandfather; Smiley Nhi in her maternal grandfather; Diabetes in her father, maternal grandfather, and paternal grandfather; Heart Attack in her father and paternal grandfather; Heart Disease in her father and paternal grandfather; High Blood Pressure in her maternal grandmother; High Cholesterol in her maternal grandmother. The patients home medications have been reviewed. Allergies: Penicillins and Vicodin [hydrocodone-acetaminophen]    -------------------------------------------------- RESULTS -------------------------------------------------  Labs:  No results found for this visit on 05/16/22. Radiology:  No orders to display       ------------------------- NURSING NOTES AND VITALS REVIEWED ---------------------------  Date / Time Roomed:  5/16/2022  1:01 PM  ED Bed Assignment:  01/01    The nursing notes within the ED encounter and vital signs as below have been reviewed. /79   Pulse 100   Temp 97.1 °F (36.2 °C) (Temporal)   Resp 16   Ht 5' 9\" (1.753 m)   Wt 215 lb (97.5 kg)   SpO2 99%   BMI 31.75 kg/m²   Oxygen Saturation Interpretation: Normal      ------------------------------------------ PROGRESS NOTES ------------------------------------------  I have spoken with the patient and discussed todays results, in addition to providing specific details for the plan of care and counseling regarding the diagnosis and prognosis. Their questions are answered at this time and they are agreeable with the plan. I discussed at length with them reasons for immediate return here for re evaluation. They will followup with primary care by calling their office tomorrow. --------------------------------- ADDITIONAL PROVIDER NOTES ---------------------------------  At this time the patient is without objective evidence of an acute process requiring hospitalization or inpatient management. They have remained hemodynamically stable throughout their entire ED visit and are stable for discharge with outpatient follow-up. The plan has been discussed in detail and they are aware of the specific conditions for emergent return, as well as the importance of follow-up.       New Prescriptions    LORATADINE (CLARITIN) 10 MG TABLET    Take 1 tablet by mouth daily       Diagnosis:  1. Dysfunction of right eustachian tube        Disposition:  Patient's disposition: Discharge to home  Patient's condition is stable.                      Jses Ramos MD  05/16/22 [id number]

## 2022-06-15 ENCOUNTER — HOSPITAL ENCOUNTER (EMERGENCY)
Age: 37
Discharge: HOME | End: 2022-06-15
Attending: EMERGENCY MEDICINE
Payer: COMMERCIAL

## 2022-06-15 VITALS
SYSTOLIC BLOOD PRESSURE: 123 MMHG | HEART RATE: 78 BPM | RESPIRATION RATE: 16 BRPM | WEIGHT: 200 LBS | HEIGHT: 69 IN | BODY MASS INDEX: 29.62 KG/M2 | OXYGEN SATURATION: 100 % | TEMPERATURE: 96.6 F | DIASTOLIC BLOOD PRESSURE: 81 MMHG

## 2022-06-15 DIAGNOSIS — L03.011 PARONYCHIA OF FINGER OF RIGHT HAND: Primary | ICD-10-CM

## 2022-06-15 PROCEDURE — 10060 I&D ABSCESS SIMPLE/SINGLE: CPT

## 2022-06-15 PROCEDURE — 99283 EMERGENCY DEPT VISIT LOW MDM: CPT

## 2022-06-15 RX ORDER — IBUPROFEN 600 MG/1
600 TABLET ORAL EVERY 8 HOURS PRN
Qty: 30 TABLET | Refills: 0 | Status: SHIPPED | OUTPATIENT
Start: 2022-06-15 | End: 2022-07-31

## 2022-06-15 RX ORDER — DOXYCYCLINE HYCLATE 100 MG
100 TABLET ORAL 2 TIMES DAILY
Qty: 20 TABLET | Refills: 0 | Status: SHIPPED | OUTPATIENT
Start: 2022-06-15 | End: 2022-06-25

## 2022-06-15 ASSESSMENT — PAIN DESCRIPTION - LOCATION: LOCATION: FINGER (COMMENT WHICH ONE)

## 2022-06-15 ASSESSMENT — PAIN SCALES - GENERAL: PAINLEVEL_OUTOF10: 10

## 2022-06-15 ASSESSMENT — PAIN DESCRIPTION - PAIN TYPE: TYPE: ACUTE PAIN

## 2022-06-15 ASSESSMENT — PAIN - FUNCTIONAL ASSESSMENT: PAIN_FUNCTIONAL_ASSESSMENT: 0-10

## 2022-06-15 ASSESSMENT — PAIN DESCRIPTION - FREQUENCY: FREQUENCY: CONTINUOUS

## 2022-06-15 ASSESSMENT — PAIN DESCRIPTION - ONSET: ONSET: SUDDEN

## 2022-06-15 ASSESSMENT — PAIN DESCRIPTION - DESCRIPTORS: DESCRIPTORS: BURNING;THROBBING

## 2022-06-15 NOTE — ED PROVIDER NOTES
HPI:  6/15/22,   Time: 11:00 AM EDT         Shaan Schafer is a 30 y.o. female presenting to the ED for redness and swelling to the right third finger, beginning 1 day ago. ROS:   Pertinent positives and negatives are stated within HPI, all other systems reviewed and are negative.  --------------------------------------------- PAST HISTORY ---------------------------------------------  Past Medical History:  has a past medical history of 28 weeks gestation of pregnancy, 35 weeks gestation of pregnancy, 38 weeks gestation of pregnancy, 39 weeks gestation of pregnancy, Abnormal Pap smear of cervix, Chronic kidney disease, Knee effusion, Mental disorder, Postpartum depression, Pyelonephritis of right kidney,  (spontaneous vaginal delivery), and Urinary tract infection in mother during second trimester of pregnancy. Past Surgical History:  has a past surgical history that includes Dilation and curettage of uterus (N/A, 2019). Social History:  reports that she has been smoking cigarettes. She has a 7.50 pack-year smoking history. She uses smokeless tobacco. She reports previous alcohol use. She reports that she does not use drugs. Family History: family history includes Allergy (Severe) in her nephew; Arthritis in her father and maternal grandfather; Breast Cancer in her mother; Cancer in her mother and paternal grandfather; Trenia Moder in her maternal grandfather; Diabetes in her father, maternal grandfather, and paternal grandfather; Heart Attack in her father and paternal grandfather; Heart Disease in her father and paternal grandfather; High Blood Pressure in her maternal grandmother; High Cholesterol in her maternal grandmother. The patients home medications have been reviewed.     Allergies: Penicillins and Vicodin [hydrocodone-acetaminophen]    -------------------------------------------------- RESULTS -------------------------------------------------  All laboratory and radiology results have been personally reviewed by myself   LABS:  No results found for this visit on 06/15/22. RADIOLOGY:  Interpreted by Radiologist.  No orders to display       ------------------------- NURSING NOTES AND VITALS REVIEWED ---------------------------   The nursing notes within the ED encounter and vital signs as below have been reviewed. /81   Pulse 78   Temp (!) 96.6 °F (35.9 °C) (Temporal)   Resp 16   Ht 5' 9\" (1.753 m)   Wt 200 lb (90.7 kg)   SpO2 100%   BMI 29.53 kg/m²   Oxygen Saturation Interpretation: Normal      ---------------------------------------------------PHYSICAL EXAM--------------------------------------      Constitutional/General: Alert and oriented x3, well appearing, non toxic in NAD  Head: NC/AT  Eyes: PERRL, EOMI  Mouth: Oropharynx clear, handling secretions, no trismus  Neck: Supple, full ROM, no meningeal signs  Pulmonary: Lungs clear to auscultation bilaterally, no wheezes, rales, or rhonchi. Not in respiratory distress  Cardiovascular:  Regular rate and rhythm, no murmurs, gallops, or rubs. 2+ distal pulses  Abdomen: Soft, non tender, non distended,   Extremities: Moves all extremities x 4. Warm and well perfused  Skin: Moderate amount of erythema soft to swelling with fluctuant mass noted around the nailbed of the right third finger  Neurologic: GCS 15,  Psych: Normal Affect      ------------------------------ ED COURSE/MEDICAL DECISION MAKING----------------------  Medications - No data to display      Medical Decision Making:      PROCEDURE  6/15/22       Time: 11:00    INCISION AND DRAINAGE  Risks, benefits and alternatives (for applicable procedures below) described. Performed By: Yumiko Quintana MD.    Indication: paronychia  Informed consent obtained: The patient was counseled regarding the procedure, it's indications, risks, potential complications and alternatives and any questions were answered. Consent was obtained. .  Prep: The skin was wiped with isopropyl alcohol and draped in a sterile fashion. Local Anesthesia:  obtained with Topical Ethyl Chloride. Incision: The paronychia was Incised by scalpal and moderate, bloody, purulent fluid was drained. A wound culture was not obtained. The wound  was not irrigated and was not packed with iodoform gauze. The wound was then covered with a sterile dressing. Patient tolerated the procedure well. Patient's Medications   New Prescriptions    DOXYCYCLINE HYCLATE (VIBRA-TABS) 100 MG TABLET    Take 1 tablet by mouth 2 times daily for 10 days    IBUPROFEN (IBU) 600 MG TABLET    Take 1 tablet by mouth every 8 hours as needed for Pain   Previous Medications    PRENATAL VIT-FE FUMARATE-FA (PRENATAL VITAMIN PO)    Take 1 tablet by mouth daily   Modified Medications    No medications on file   Discontinued Medications    ACETAMINOPHEN (TYLENOL) 500 MG TABLET    Take 500 mg by mouth every 6 hours as needed for Pain    ALBUTEROL SULFATE  (90 BASE) MCG/ACT INHALER    Inhale 2 puffs into the lungs every 6 hours as needed for Wheezing    IBUPROFEN (ADVIL;MOTRIN) 800 MG TABLET    Take 1 tablet by mouth every 6 hours as needed for Pain    LORATADINE (CLARITIN) 10 MG TABLET    Take 1 tablet by mouth daily         Counseling: The emergency provider has spoken with the patient and discussed todays results, in addition to providing specific details for the plan of care and counseling regarding the diagnosis and prognosis. Questions are answered at this time and they are agreeable with the plan.      --------------------------------- IMPRESSION AND DISPOSITION ---------------------------------    IMPRESSION  1.  Paronychia of finger of right hand        DISPOSITION  Disposition: Discharge to home  Patient condition is good          '        Albino Carrillo MD  06/15/22 [id number]

## 2022-07-31 ENCOUNTER — HOSPITAL ENCOUNTER (EMERGENCY)
Age: 37
Discharge: HOME | End: 2022-07-31
Payer: COMMERCIAL

## 2022-07-31 VITALS
DIASTOLIC BLOOD PRESSURE: 85 MMHG | SYSTOLIC BLOOD PRESSURE: 134 MMHG | HEIGHT: 69 IN | OXYGEN SATURATION: 98 % | HEART RATE: 92 BPM | RESPIRATION RATE: 16 BRPM | WEIGHT: 200 LBS | TEMPERATURE: 97.3 F | BODY MASS INDEX: 29.62 KG/M2

## 2022-07-31 DIAGNOSIS — M79.10 MUSCLE SORENESS: Primary | ICD-10-CM

## 2022-07-31 DIAGNOSIS — J02.9 ACUTE PHARYNGITIS, UNSPECIFIED ETIOLOGY: ICD-10-CM

## 2022-07-31 PROCEDURE — 96372 THER/PROPH/DIAG INJ SC/IM: CPT

## 2022-07-31 PROCEDURE — 6360000002 HC RX W HCPCS: Performed by: STUDENT IN AN ORGANIZED HEALTH CARE EDUCATION/TRAINING PROGRAM

## 2022-07-31 PROCEDURE — 99284 EMERGENCY DEPT VISIT MOD MDM: CPT

## 2022-07-31 RX ORDER — KETOROLAC TROMETHAMINE 30 MG/ML
30 INJECTION, SOLUTION INTRAMUSCULAR; INTRAVENOUS ONCE
Status: COMPLETED | OUTPATIENT
Start: 2022-07-31 | End: 2022-07-31

## 2022-07-31 RX ADMIN — KETOROLAC TROMETHAMINE 30 MG: 30 INJECTION, SOLUTION INTRAMUSCULAR; INTRAVENOUS at 18:05

## 2022-07-31 ASSESSMENT — ENCOUNTER SYMPTOMS
DIARRHEA: 0
BACK PAIN: 0
SORE THROAT: 1
SINUS PRESSURE: 0
COUGH: 0
WHEEZING: 0
EYE PAIN: 0
ABDOMINAL DISTENTION: 0
SHORTNESS OF BREATH: 0
VOMITING: 0
EYE DISCHARGE: 0
NAUSEA: 0
EYE REDNESS: 0

## 2022-07-31 ASSESSMENT — PAIN - FUNCTIONAL ASSESSMENT: PAIN_FUNCTIONAL_ASSESSMENT: 0-10

## 2022-07-31 ASSESSMENT — PAIN DESCRIPTION - FREQUENCY: FREQUENCY: CONTINUOUS

## 2022-07-31 ASSESSMENT — PAIN DESCRIPTION - ONSET: ONSET: ON-GOING

## 2022-07-31 ASSESSMENT — PAIN DESCRIPTION - PAIN TYPE: TYPE: ACUTE PAIN

## 2022-07-31 ASSESSMENT — PAIN DESCRIPTION - ORIENTATION: ORIENTATION: RIGHT;LEFT

## 2022-07-31 ASSESSMENT — PAIN SCALES - GENERAL: PAINLEVEL_OUTOF10: 8

## 2022-07-31 ASSESSMENT — PAIN DESCRIPTION - DESCRIPTORS: DESCRIPTORS: PATIENT UNABLE TO DESCRIBE

## 2022-07-31 NOTE — ED PROVIDER NOTES
Department of Emergency Medicine   ED  Provider Note  Admit Date/RoomTime: 7/31/2022  5:34 PM  ED Room: 03/03          History of Present Illness:  7/31/22, Time: 5:59 PM EDT  Chief Complaint   Patient presents with    Arm Pain     Bilateral elbow pain. \"Two days ago I emptied out my pool and now my elbows hurt. I can't move them\" Pt noted to be rubbing both arms with the other arm. Good ROM noted during interview    Pharyngitis     And I have a sore throat            Markie Crawford is a 30 y.o. female presenting to the ED for bilateral arm pain, beginning yesterday. The complaint has been constant, severe in severity, and worsened by movement and palpation. Patient states that 2 days ago she emptied her pool by using a bucket to dump water out of it. Patient states that the next day she woke up and had severe achy and sore arms from the biceps to the mid forearm. Patient states she has not done anything to make it better. She denies any specific injury. She also mentions that she does have a sore throat that she woke up with this morning. Patient otherwise denies any other accompanying symptoms including but not limited to fevers, chest pain, shortness of breath, nausea, vomiting, numbness, tingling, weakness, or changes to her urine or stool. Review of Systems   Constitutional:  Negative for chills and fever. HENT:  Positive for sore throat. Negative for ear pain and sinus pressure. Eyes:  Negative for pain, discharge and redness. Respiratory:  Negative for cough, shortness of breath and wheezing. Cardiovascular:  Negative for chest pain. Gastrointestinal:  Negative for abdominal distention, diarrhea, nausea and vomiting. Genitourinary:  Negative for dysuria and frequency. Musculoskeletal:  Negative for arthralgias and back pain. Skin:  Negative for rash and wound. Neurological:  Negative for weakness and headaches. Hematological:  Negative for adenopathy.    All other systems reviewed and are negative.       --------------------------------------------- PAST HISTORY ---------------------------------------------  Past Medical History:  has a past medical history of 28 weeks gestation of pregnancy, 35 weeks gestation of pregnancy, 38 weeks gestation of pregnancy, 39 weeks gestation of pregnancy, Abnormal Pap smear of cervix, Chronic kidney disease, Knee effusion, Mental disorder, Postpartum depression, Pyelonephritis of right kidney,  (spontaneous vaginal delivery), and Urinary tract infection in mother during second trimester of pregnancy. Past Surgical History:  has a past surgical history that includes Dilation and curettage of uterus (N/A, 2019). Social History:  reports that she has been smoking cigarettes. She has a 7.50 pack-year smoking history. She uses smokeless tobacco. She reports that she does not currently use alcohol. She reports that she does not use drugs. Family History: family history includes Allergy (Severe) in her nephew; Arthritis in her father and maternal grandfather; Breast Cancer in her mother; Cancer in her mother and paternal grandfather; Romayne Dixons in her maternal grandfather; Diabetes in her father, maternal grandfather, and paternal grandfather; Heart Attack in her father and paternal grandfather; Heart Disease in her father and paternal grandfather; High Blood Pressure in her maternal grandmother; High Cholesterol in her maternal grandmother. . Unless otherwise noted, family history is non contributory    The patients home medications have been reviewed. Allergies: Penicillins and Vicodin [hydrocodone-acetaminophen]        ---------------------------------------------------PHYSICAL EXAM--------------------------------------    Physical Exam  Vitals and nursing note reviewed. Constitutional:       General: She is not in acute distress. Appearance: She is well-developed. She is obese. HENT:      Head: Normocephalic and atraumatic. Mouth/Throat:      Mouth: Mucous membranes are moist.      Pharynx: Oropharynx is clear. No oropharyngeal exudate or posterior oropharyngeal erythema. Eyes:      Conjunctiva/sclera: Conjunctivae normal.   Cardiovascular:      Rate and Rhythm: Normal rate and regular rhythm. Heart sounds: Normal heart sounds. No murmur heard. Pulmonary:      Effort: Pulmonary effort is normal. No respiratory distress. Breath sounds: Normal breath sounds. No wheezing or rales. Abdominal:      General: Bowel sounds are normal.      Palpations: Abdomen is soft. Tenderness: There is no abdominal tenderness. There is no guarding or rebound. Musculoskeletal:         General: Tenderness present. No swelling, deformity or signs of injury. Normal range of motion. Cervical back: Normal range of motion and neck supple. Skin:     General: Skin is warm and dry. Neurological:      General: No focal deficit present. Mental Status: She is alert and oriented to person, place, and time. Cranial Nerves: No cranial nerve deficit. Coordination: Coordination normal.          -------------------------------------------------- RESULTS -------------------------------------------------  I have personally reviewed all laboratory and imaging results for this patient. Results are listed below.      LABS: (Lab results interpreted by me)  No results found for this visit on 07/31/22.,       RADIOLOGY:  Interpreted by Radiologist unless otherwise specified  No orders to display                    ------------------------- NURSING NOTES AND VITALS REVIEWED ---------------------------   The nursing notes within the ED encounter and vital signs as below have been reviewed by myself  Pulse 92   Temp 97.3 °F (36.3 °C) (Temporal)   Resp 16   Ht 5' 9\" (1.753 m)   Wt 200 lb (90.7 kg)   LMP 07/05/2022   SpO2 98%   BMI 29.53 kg/m²     Oxygen Saturation Interpretation: Normal      The patients available past medical records and past encounters were reviewed. ------------------------------ ED COURSE/MEDICAL DECISION MAKING----------------------  Medications   ketorolac (TORADOL) injection 30 mg (has no administration in time range)            Medical Decision Making:     Patient presents with arm pain. Likely secondary to muscle soreness after emptying her pool. Toradol given. Patient also mentions some sore throat. There is no erythema or exudates. Given her age, low concern for strep. Patient will not be tested. Advised to use ibuprofen and Tylenol as needed for her arm pain and sore throat. Patient's vitals otherwise stable. Patient discharged home. This patient's ED course included: a personal history and physicial examination    This patient has remained hemodynamically stable during their ED course. Counseling: The emergency provider has spoken with the patient and discussed todays results, in addition to providing specific details for the plan of care and counseling regarding the diagnosis and prognosis. Questions are answered at this time and they are agreeable with the plan.       --------------------------------- IMPRESSION AND DISPOSITION ---------------------------------    IMPRESSION  1. Muscle soreness    2. Acute pharyngitis, unspecified etiology        DISPOSITION  Disposition: Discharge to home  Patient condition is stable        NOTE: This report was transcribed using voice recognition software.  Every effort was made to ensure accuracy; however, inadvertent computerized transcription errors may be present           Benigno Heath DO  Resident  07/31/22 [id number]

## 2022-09-02 ENCOUNTER — APPOINTMENT (OUTPATIENT)
Dept: CT IMAGING | Age: 37
End: 2022-09-02
Payer: COMMERCIAL

## 2022-09-02 ENCOUNTER — HOSPITAL ENCOUNTER (EMERGENCY)
Age: 37
Discharge: HOME | End: 2022-09-02
Attending: EMERGENCY MEDICINE
Payer: COMMERCIAL

## 2022-09-02 VITALS
RESPIRATION RATE: 18 BRPM | WEIGHT: 203 LBS | HEART RATE: 90 BPM | BODY MASS INDEX: 30.07 KG/M2 | SYSTOLIC BLOOD PRESSURE: 130 MMHG | DIASTOLIC BLOOD PRESSURE: 78 MMHG | TEMPERATURE: 98.8 F | HEIGHT: 69 IN | OXYGEN SATURATION: 100 %

## 2022-09-02 DIAGNOSIS — N20.0 NEPHROLITHIASIS: ICD-10-CM

## 2022-09-02 DIAGNOSIS — N23 RENAL COLIC: Primary | ICD-10-CM

## 2022-09-02 LAB
ALBUMIN SERPL-MCNC: 3.7 G/DL (ref 3.5–5.2)
ALP BLD-CCNC: 152 U/L (ref 35–104)
ALT SERPL-CCNC: 43 U/L (ref 0–32)
ANION GAP SERPL CALCULATED.3IONS-SCNC: 13 MMOL/L (ref 7–16)
AST SERPL-CCNC: 32 U/L (ref 0–31)
BACTERIA: ABNORMAL /HPF
BASOPHILS ABSOLUTE: 0.04 E9/L (ref 0–0.2)
BASOPHILS RELATIVE PERCENT: 0.7 % (ref 0–2)
BILIRUB SERPL-MCNC: 0.4 MG/DL (ref 0–1.2)
BILIRUBIN URINE: ABNORMAL
BLOOD, URINE: ABNORMAL
BUN BLDV-MCNC: 15 MG/DL (ref 6–20)
CALCIUM SERPL-MCNC: 9.5 MG/DL (ref 8.6–10.2)
CHLORIDE BLD-SCNC: 107 MMOL/L (ref 98–107)
CLARITY: ABNORMAL
CO2: 23 MMOL/L (ref 22–29)
COLOR: ABNORMAL
CREAT SERPL-MCNC: 0.7 MG/DL (ref 0.5–1)
EOSINOPHILS ABSOLUTE: 0.24 E9/L (ref 0.05–0.5)
EOSINOPHILS RELATIVE PERCENT: 4.3 % (ref 0–6)
EPITHELIAL CELLS, UA: ABNORMAL /HPF
GFR AFRICAN AMERICAN: >60
GFR NON-AFRICAN AMERICAN: >60 ML/MIN/1.73
GLUCOSE BLD-MCNC: 133 MG/DL (ref 74–99)
GLUCOSE URINE: 100 MG/DL
HCG, URINE, POC: NEGATIVE
HCT VFR BLD CALC: 36.5 % (ref 34–48)
HEMOGLOBIN: 11.5 G/DL (ref 11.5–15.5)
IMMATURE GRANULOCYTES #: 0.04 E9/L
IMMATURE GRANULOCYTES %: 0.7 % (ref 0–5)
KETONES, URINE: ABNORMAL MG/DL
LACTIC ACID: 1.3 MMOL/L (ref 0.5–2.2)
LEUKOCYTE ESTERASE, URINE: ABNORMAL
LIPASE: 21 U/L (ref 13–60)
LYMPHOCYTES ABSOLUTE: 1.2 E9/L (ref 1.5–4)
LYMPHOCYTES RELATIVE PERCENT: 21.4 % (ref 20–42)
Lab: NORMAL
MCH RBC QN AUTO: 26.9 PG (ref 26–35)
MCHC RBC AUTO-ENTMCNC: 31.5 % (ref 32–34.5)
MCV RBC AUTO: 85.5 FL (ref 80–99.9)
MONOCYTES ABSOLUTE: 0.74 E9/L (ref 0.1–0.95)
MONOCYTES RELATIVE PERCENT: 13.2 % (ref 2–12)
NEGATIVE QC PASS/FAIL: NORMAL
NEUTROPHILS ABSOLUTE: 3.36 E9/L (ref 1.8–7.3)
NEUTROPHILS RELATIVE PERCENT: 59.7 % (ref 43–80)
NITRITE, URINE: NEGATIVE
PDW BLD-RTO: 13.9 FL (ref 11.5–15)
PH UA: 6 (ref 5–9)
PLATELET # BLD: 188 E9/L (ref 130–450)
PMV BLD AUTO: 11.4 FL (ref 7–12)
POSITIVE QC PASS/FAIL: NORMAL
POTASSIUM REFLEX MAGNESIUM: 3.6 MMOL/L (ref 3.5–5)
PROTEIN UA: 100 MG/DL
RBC # BLD: 4.27 E12/L (ref 3.5–5.5)
RBC UA: ABNORMAL /HPF (ref 0–2)
SODIUM BLD-SCNC: 143 MMOL/L (ref 132–146)
SPECIFIC GRAVITY UA: >=1.03 (ref 1–1.03)
TOTAL PROTEIN: 7.4 G/DL (ref 6.4–8.3)
UROBILINOGEN, URINE: >=8 E.U./DL
WBC # BLD: 5.6 E9/L (ref 4.5–11.5)
WBC UA: ABNORMAL /HPF (ref 0–5)

## 2022-09-02 PROCEDURE — 6360000002 HC RX W HCPCS: Performed by: STUDENT IN AN ORGANIZED HEALTH CARE EDUCATION/TRAINING PROGRAM

## 2022-09-02 PROCEDURE — 87088 URINE BACTERIA CULTURE: CPT

## 2022-09-02 PROCEDURE — 81001 URINALYSIS AUTO W/SCOPE: CPT

## 2022-09-02 PROCEDURE — 96374 THER/PROPH/DIAG INJ IV PUSH: CPT

## 2022-09-02 PROCEDURE — 96376 TX/PRO/DX INJ SAME DRUG ADON: CPT

## 2022-09-02 PROCEDURE — 96375 TX/PRO/DX INJ NEW DRUG ADDON: CPT

## 2022-09-02 PROCEDURE — 83605 ASSAY OF LACTIC ACID: CPT

## 2022-09-02 PROCEDURE — 2580000003 HC RX 258: Performed by: STUDENT IN AN ORGANIZED HEALTH CARE EDUCATION/TRAINING PROGRAM

## 2022-09-02 PROCEDURE — 74176 CT ABD & PELVIS W/O CONTRAST: CPT

## 2022-09-02 PROCEDURE — 96361 HYDRATE IV INFUSION ADD-ON: CPT

## 2022-09-02 PROCEDURE — 83690 ASSAY OF LIPASE: CPT

## 2022-09-02 PROCEDURE — 99284 EMERGENCY DEPT VISIT MOD MDM: CPT

## 2022-09-02 PROCEDURE — 85025 COMPLETE CBC W/AUTO DIFF WBC: CPT

## 2022-09-02 PROCEDURE — 80053 COMPREHEN METABOLIC PANEL: CPT

## 2022-09-02 RX ORDER — ONDANSETRON 4 MG/1
8 TABLET, ORALLY DISINTEGRATING ORAL EVERY 8 HOURS PRN
Qty: 20 TABLET | Refills: 0 | Status: SHIPPED | OUTPATIENT
Start: 2022-09-02

## 2022-09-02 RX ORDER — NAPROXEN 250 MG/1
250 TABLET ORAL 2 TIMES DAILY PRN
Qty: 10 TABLET | Refills: 0 | Status: SHIPPED | OUTPATIENT
Start: 2022-09-02 | End: 2022-09-07

## 2022-09-02 RX ORDER — FENTANYL CITRATE 0.05 MG/ML
50 INJECTION, SOLUTION INTRAMUSCULAR; INTRAVENOUS ONCE
Status: COMPLETED | OUTPATIENT
Start: 2022-09-02 | End: 2022-09-02

## 2022-09-02 RX ORDER — 0.9 % SODIUM CHLORIDE 0.9 %
1000 INTRAVENOUS SOLUTION INTRAVENOUS ONCE
Status: COMPLETED | OUTPATIENT
Start: 2022-09-02 | End: 2022-09-02

## 2022-09-02 RX ORDER — KETOROLAC TROMETHAMINE 15 MG/ML
15 INJECTION, SOLUTION INTRAMUSCULAR; INTRAVENOUS ONCE
Status: COMPLETED | OUTPATIENT
Start: 2022-09-02 | End: 2022-09-02

## 2022-09-02 RX ORDER — FENTANYL CITRATE 0.05 MG/ML
25 INJECTION, SOLUTION INTRAMUSCULAR; INTRAVENOUS ONCE
Status: COMPLETED | OUTPATIENT
Start: 2022-09-02 | End: 2022-09-02

## 2022-09-02 RX ORDER — ONDANSETRON 2 MG/ML
4 INJECTION INTRAMUSCULAR; INTRAVENOUS ONCE
Status: COMPLETED | OUTPATIENT
Start: 2022-09-02 | End: 2022-09-02

## 2022-09-02 RX ADMIN — FENTANYL CITRATE 25 MCG: 50 INJECTION INTRAMUSCULAR; INTRAVENOUS at 20:03

## 2022-09-02 RX ADMIN — FENTANYL CITRATE 50 MCG: 50 INJECTION INTRAMUSCULAR; INTRAVENOUS at 18:26

## 2022-09-02 RX ADMIN — ONDANSETRON 4 MG: 2 INJECTION INTRAMUSCULAR; INTRAVENOUS at 18:26

## 2022-09-02 RX ADMIN — SODIUM CHLORIDE 1000 ML: 9 INJECTION, SOLUTION INTRAVENOUS at 18:23

## 2022-09-02 RX ADMIN — KETOROLAC TROMETHAMINE 15 MG: 15 INJECTION, SOLUTION INTRAMUSCULAR; INTRAVENOUS at 20:03

## 2022-09-02 ASSESSMENT — PAIN DESCRIPTION - DESCRIPTORS
DESCRIPTORS: ACHING;SHARP
DESCRIPTORS: ACHING

## 2022-09-02 ASSESSMENT — PAIN DESCRIPTION - ORIENTATION: ORIENTATION: LEFT

## 2022-09-02 ASSESSMENT — PAIN SCALES - GENERAL
PAINLEVEL_OUTOF10: 6
PAINLEVEL_OUTOF10: 9
PAINLEVEL_OUTOF10: 2
PAINLEVEL_OUTOF10: 10

## 2022-09-02 ASSESSMENT — PAIN DESCRIPTION - LOCATION
LOCATION: FLANK

## 2022-09-02 ASSESSMENT — PAIN - FUNCTIONAL ASSESSMENT: PAIN_FUNCTIONAL_ASSESSMENT: 0-10

## 2022-09-02 NOTE — ED PROVIDER NOTES
Department of Emergency Medicine   ED Provider Note  Admit Date/RoomTime: 2022  5:54 PM  ED Room: [place] [address]          History of Present Illness:  22, Time: 5:59 PM EDT         Ericka Marrufo is a 30 y.o. female presenting to the ED for left-sided flank pain, beginning yesterday. The complaint has been intermittent, severe in severity, and worsened by nothing. Patient states that she went to [facility] yesterday, was told that she had a kidney stone and possible sepsis, was given antibiotics and pain medicines there. She was told that she would be admitted but signed out AMA because of modifications. She went back to the emergency department there today, was reportedly admitted but was not given anything other than Toradol and was dissatisfied with her pain control and therefore signed out AMA again and came here. She has been having intermittent left-sided flank pain that is severe, sharp, worse with nothing and improved by nothing. She has had subjective fevers at home, intermittent dysuria. She has history of kidney stones in the past.  She has had nausea and vomiting, denies hematemesis. She is currently mildly nauseous. She denies lightheadedness syncope or chest pain or shortness of breath. Review of Systems:     Pertinent positives and negatives are stated within HPI. 10 point ROS otherwise negative.    --------------------------------------------- PAST HISTORY ---------------------------------------------  Past Medical History:  has a past medical history of 28 weeks gestation of pregnancy, 35 weeks gestation of pregnancy, 38 weeks gestation of pregnancy, 39 weeks gestation of pregnancy, Abnormal Pap smear of cervix, Chronic kidney disease, Knee effusion, Mental disorder, Postpartum depression, Pyelonephritis of right kidney,  (spontaneous vaginal delivery), and Urinary tract infection in mother during second trimester of pregnancy.     Past Surgical History:  has a past surgical history that includes Dilation and curettage of uterus (N/A, 7/6/2019). Social History:  reports that she has been smoking cigarettes. She has a 7.50 pack-year smoking history. She uses smokeless tobacco. She reports that she does not currently use alcohol. She reports that she does not use drugs. Family History: family history includes Allergy (Severe) in her nephew; Arthritis in her father and maternal grandfather; Breast Cancer in her mother; Cancer in her mother and paternal grandfather; Eliz Comber in her maternal grandfather; Diabetes in her father, maternal grandfather, and paternal grandfather; Heart Attack in her father and paternal grandfather; Heart Disease in her father and paternal grandfather; High Blood Pressure in her maternal grandmother; High Cholesterol in her maternal grandmother. The patients home medications have been reviewed. Allergies: Penicillins and Vicodin [hydrocodone-acetaminophen]        ---------------------------------------------------PHYSICAL EXAM--------------------------------------    Constitutional/General: AAO to person/place/time/purpose, mildly uncomfortable appearing  Head: Normocephalic and atraumatic  Eyes: EOMI, conjunctiva normal, sclera non icteric  Mouth: Moist mucous membranes, uvula midline  Neck: Supple, no stridor, no meningeal signs  Respiratory: Lungs clear to auscultation bilaterally, no wheezes, rales, or rhonchi. Not in respiratory distress. Mildly tachypnea. Cardiovascular:  Regular rate. Regular rhythm. No murmurs, no gallops, or rubs. 2+ distal pulses. Equal extremity pulses. Chest: No chest wall tenderness or deformity  GI:  Abdomen Soft, moderately tender left CVA/flank, Non distended. No rebound, guarding, or rigidity. No pulsatile masses. Musculoskeletal: Moves all extremities x 4. Warm and well perfused, no clubbing, cyanosis, or edema. Capillary refill <3 seconds  Integument: skin warm and dry. No rashes.    Neurologic: GCS 15, no focal deficits, symmetric strength 5/5 in the major muscle groups of upper and lower extremities bilaterally  Psychiatric: Normal Affect      -----------------------------------------PROCEDURES----------------------------------------------------      -------------------------------------------------- RESULTS -------------------------------------------------  I have personally reviewed all laboratory and imaging results for this patient. Results are listed below.      LABS:  Results for orders placed or performed during the hospital encounter of 09/02/22   CBC with Auto Differential   Result Value Ref Range    WBC 5.6 4.5 - 11.5 E9/L    RBC 4.27 3.50 - 5.50 E12/L    Hemoglobin 11.5 11.5 - 15.5 g/dL    Hematocrit 36.5 34.0 - 48.0 %    MCV 85.5 80.0 - 99.9 fL    MCH 26.9 26.0 - 35.0 pg    MCHC 31.5 (L) 32.0 - 34.5 %    RDW 13.9 11.5 - 15.0 fL    Platelets 186 605 - 626 E9/L    MPV 11.4 7.0 - 12.0 fL    Neutrophils % 59.7 43.0 - 80.0 %    Immature Granulocytes % 0.7 0.0 - 5.0 %    Lymphocytes % 21.4 20.0 - 42.0 %    Monocytes % 13.2 (H) 2.0 - 12.0 %    Eosinophils % 4.3 0.0 - 6.0 %    Basophils % 0.7 0.0 - 2.0 %    Neutrophils Absolute 3.36 1.80 - 7.30 E9/L    Immature Granulocytes # 0.04 E9/L    Lymphocytes Absolute 1.20 (L) 1.50 - 4.00 E9/L    Monocytes Absolute 0.74 0.10 - 0.95 E9/L    Eosinophils Absolute 0.24 0.05 - 0.50 E9/L    Basophils Absolute 0.04 0.00 - 0.20 E9/L   Comprehensive Metabolic Panel w/ Reflex to MG   Result Value Ref Range    Sodium 143 132 - 146 mmol/L    Potassium reflex Magnesium 3.6 3.5 - 5.0 mmol/L    Chloride 107 98 - 107 mmol/L    CO2 23 22 - 29 mmol/L    Anion Gap 13 7 - 16 mmol/L    Glucose 133 (H) 74 - 99 mg/dL    BUN 15 6 - 20 mg/dL    Creatinine 0.7 0.5 - 1.0 mg/dL    GFR Non-African American >60 >=60 mL/min/1.73    GFR African American >60     Calcium 9.5 8.6 - 10.2 mg/dL    Total Protein 7.4 6.4 - 8.3 g/dL    Albumin 3.7 3.5 - 5.2 g/dL    Total Bilirubin 0.4 0.0 - 1.2 mg/dL Alkaline Phosphatase 152 (H) 35 - 104 U/L    ALT 43 (H) 0 - 32 U/L    AST 32 (H) 0 - 31 U/L   Lipase   Result Value Ref Range    Lipase 21 13 - 60 U/L   Urinalysis with Microscopic   Result Value Ref Range    Color, UA VALENTIN (A) Straw/Yellow    Clarity, UA SLCLOUDY Clear    Glucose, Ur 100 (A) Negative mg/dL    Bilirubin Urine MODERATE (A) Negative    Ketones, Urine TRACE (A) Negative mg/dL    Specific Gravity, UA >=1.030 1.005 - 1.030    Blood, Urine SMALL (A) Negative    pH, UA 6.0 5.0 - 9.0    Protein,  (A) Negative mg/dL    Urobilinogen, Urine >=8.0 (A) <2.0 E.U./dL    Nitrite, Urine Negative Negative    Leukocyte Esterase, Urine TRACE (A) Negative    WBC, UA 1-3 0 - 5 /HPF    RBC, UA 0-1 0 - 2 /HPF    Epithelial Cells, UA FEW /HPF    Bacteria, UA RARE (A) None Seen /HPF   Lactic Acid   Result Value Ref Range    Lactic Acid 1.3 0.5 - 2.2 mmol/L   POC Pregnancy Urine   Result Value Ref Range    HCG, Urine, POC Negative Negative    Lot Number [id number]     Positive QC Pass/Fail Pass     Negative QC Pass/Fail Pass        RADIOLOGY:  Interpreted by Radiologist unless otherwise specified  CT ABDOMEN PELVIS WO CONTRAST Additional Contrast? None   Final Result   1. Mild fullness of the left collecting system with subtle perinephric edema. Findings may be due to recently passed calculus. An infectious etiology   cannot be excluded. 2. No ureteral calculus seen. Tiny 1-2 mm intrarenal calculi bilaterally. 3. Mild hepatomegaly. ------------------------- NURSING NOTES AND VITALS REVIEWED ---------------------------   The nursing notes within the ED encounter and vital signs as below have been reviewed by myself. /78   Pulse 90   Temp 98.8 °F (37.1 °C) (Oral)   Resp 18   Ht 5' 9\" (1.753 m)   Wt 203 lb (92.1 kg)   SpO2 100%   BMI 29.98 kg/m²   Oxygen Saturation Interpretation: Normal    The patients available past medical records and past encounters were reviewed. information unless otherwise noted. I have also reviewed and agree with the past medical, family and social history unless otherwise noted. I have discussed this patient in detail with the resident, and provided the instruction and education regarding patient here complaining of kidney stones and possible UTI. States she was admitted at [place] and was receiving IV antibiotics but states they were not fixing her so she left. States she is here for evaluation of kidney stones and infection. No fevers, sweats or chills at home. No current dysuria or hematuria. Does states she has some left flank pain. .  My findings/plan: Patient sitting in the chair resting comfortably no distress. Heart rate regular, lungs are clear and equal.  Abdomen soft nontender. Mild left CVA tenderness. No jaundice or icterus. Nontoxic in appearance. [NC]      ED Course User Index  [NC] Maurice Chambers,          This patient's ED course included: a personal history and physicial examination, re-evaluation prior to disposition, multiple bedside re-evaluations, IV medications, and continuous pulse oximetry    This patient has remained hemodynamically stable during their ED course. Consultations:  See ED Course    Counseling: The emergency provider has spoken with the patient and discussed todays results, in addition to providing specific details for the plan of care and counseling regarding the diagnosis and prognosis. Questions are answered at this time and they are agreeable with the plan.       --------------------------------- IMPRESSION AND DISPOSITION ---------------------------------    IMPRESSION  1. Renal colic    2. Nephrolithiasis        DISPOSITION  Disposition: Discharge to home  Patient condition is stable    NOTE: This report was transcribed using voice recognition software. Every effort was made to ensure accuracy; however, inadvertent computerized transcription errors may be present.   Also please note that patient was seen and examined by attending physician. Plan of care and disposition discussed with attending physician and they were immediately available or present for all procedures performed.        -- Nisreen Ventura D.O. PGY-3     Resident Physician     Emergency Medicine      9/3/2022 12:36 AM         [address] DO  Resident  09/03/22 [id number]

## 2022-09-02 NOTE — ED NOTES
Patient c/o pain at this time, this nurse made MD aware of patient c/o pain.        Alexander Flores RN  09/02/22 [id number]

## 2022-09-05 LAB — URINE CULTURE, ROUTINE: NORMAL

## 2023-06-17 ENCOUNTER — HOSPITAL ENCOUNTER (EMERGENCY)
Age: 38
Discharge: HOME | End: 2023-06-17
Attending: EMERGENCY MEDICINE
Payer: COMMERCIAL

## 2023-06-17 ENCOUNTER — APPOINTMENT (OUTPATIENT)
Dept: CT IMAGING | Age: 38
End: 2023-06-17
Payer: COMMERCIAL

## 2023-06-17 ENCOUNTER — APPOINTMENT (OUTPATIENT)
Dept: GENERAL RADIOLOGY | Age: 38
End: 2023-06-17
Payer: COMMERCIAL

## 2023-06-17 VITALS
DIASTOLIC BLOOD PRESSURE: 56 MMHG | HEART RATE: 93 BPM | OXYGEN SATURATION: 98 % | TEMPERATURE: 98.1 F | SYSTOLIC BLOOD PRESSURE: 126 MMHG | RESPIRATION RATE: 18 BRPM

## 2023-06-17 DIAGNOSIS — R51.9 FACIAL PAIN: ICD-10-CM
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
## 2023-06-17 DIAGNOSIS — M25.512 ACUTE PAIN OF LEFT SHOULDER: Primary | ICD-10-CM

## 2023-06-17 LAB
EKG ATRIAL RATE: 81 BPM
EKG P AXIS: 45 DEGREES
EKG P-R INTERVAL: 146 MS
EKG Q-T INTERVAL: 370 MS
EKG QRS DURATION: 86 MS
EKG QTC CALCULATION (BAZETT): 429 MS
EKG R AXIS: 63 DEGREES
EKG T AXIS: 43 DEGREES
EKG VENTRICULAR RATE: 81 BPM

## 2023-06-17 PROCEDURE — 93005 ELECTROCARDIOGRAM TRACING: CPT | Performed by: EMERGENCY MEDICINE

## 2023-06-17 PROCEDURE — 99284 EMERGENCY DEPT VISIT MOD MDM: CPT

## 2023-06-17 PROCEDURE — 93010 ELECTROCARDIOGRAM REPORT: CPT | Performed by: INTERNAL MEDICINE

## 2023-06-17 PROCEDURE — 70486 CT MAXILLOFACIAL W/O DYE: CPT

## 2023-06-17 PROCEDURE — 73030 X-RAY EXAM OF SHOULDER: CPT

## 2023-06-17 PROCEDURE — 6370000000 HC RX 637 (ALT 250 FOR IP): Performed by: EMERGENCY MEDICINE

## 2023-06-17 RX ORDER — ACETAMINOPHEN 500 MG
1000 TABLET ORAL ONCE
Status: COMPLETED | OUTPATIENT
Start: 2023-06-17 | End: 2023-06-17

## 2023-06-17 RX ADMIN — ACETAMINOPHEN 1000 MG: 500 TABLET ORAL at 13:17

## 2023-06-17 ASSESSMENT — ENCOUNTER SYMPTOMS
ABDOMINAL DISTENTION: 0
VOMITING: 0
BACK PAIN: 0
SINUS PRESSURE: 0
NAUSEA: 0
EYE DISCHARGE: 0
SHORTNESS OF BREATH: 0
SORE THROAT: 0
COUGH: 0
EYE REDNESS: 0
WHEEZING: 0
ABDOMINAL PAIN: 0
EYE PAIN: 0
DIARRHEA: 0

## 2023-06-17 ASSESSMENT — PAIN SCALES - GENERAL: PAINLEVEL_OUTOF10: 7

## 2023-06-17 ASSESSMENT — PAIN DESCRIPTION - LOCATION: LOCATION: FACE;SHOULDER

## 2023-06-17 ASSESSMENT — PAIN DESCRIPTION - DESCRIPTORS: DESCRIPTORS: THROBBING
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
## 2023-10-05 LAB
HCG, URINE, POC: POSITIVE
Lab: NORMAL
NEGATIVE QC PASS/FAIL: NORMAL
POSITIVE QC PASS/FAIL: NORMAL

## 2023-10-05 PROCEDURE — 86850 RBC ANTIBODY SCREEN: CPT

## 2023-10-05 PROCEDURE — 80053 COMPREHEN METABOLIC PANEL: CPT

## 2023-10-05 PROCEDURE — 86901 BLOOD TYPING SEROLOGIC RH(D): CPT

## 2023-10-05 PROCEDURE — 99284 EMERGENCY DEPT VISIT MOD MDM: CPT

## 2023-10-05 PROCEDURE — 85025 COMPLETE CBC W/AUTO DIFF WBC: CPT

## 2023-10-05 PROCEDURE — 81001 URINALYSIS AUTO W/SCOPE: CPT

## 2023-10-05 PROCEDURE — 84702 CHORIONIC GONADOTROPIN TEST: CPT

## 2023-10-05 PROCEDURE — 86900 BLOOD TYPING SEROLOGIC ABO: CPT

## 2023-10-05 RX ORDER — ACETAMINOPHEN 325 MG/1
650 TABLET ORAL ONCE
Status: COMPLETED | OUTPATIENT
Start: 2023-10-05 | End: 2023-10-06

## 2023-10-05 ASSESSMENT — PAIN DESCRIPTION - DESCRIPTORS: DESCRIPTORS: ACHING;THROBBING

## 2023-10-05 ASSESSMENT — PAIN DESCRIPTION - PAIN TYPE: TYPE: ACUTE PAIN

## 2023-10-05 ASSESSMENT — PAIN DESCRIPTION - LOCATION: LOCATION: MOUTH

## 2023-10-05 ASSESSMENT — PAIN - FUNCTIONAL ASSESSMENT: PAIN_FUNCTIONAL_ASSESSMENT: 0-10

## 2023-10-05 ASSESSMENT — PAIN DESCRIPTION - ORIENTATION: ORIENTATION: RIGHT

## 2023-10-06 ENCOUNTER — HOSPITAL ENCOUNTER (EMERGENCY)
Age: 38
Discharge: HOME | End: 2023-10-06
Attending: EMERGENCY MEDICINE
Payer: COMMERCIAL

## 2023-10-06 ENCOUNTER — APPOINTMENT (OUTPATIENT)
Dept: ULTRASOUND IMAGING | Age: 38
End: 2023-10-06
Payer: COMMERCIAL

## 2023-10-06 VITALS
HEART RATE: 83 BPM | BODY MASS INDEX: 31.1 KG/M2 | WEIGHT: 210 LBS | HEIGHT: 69 IN | TEMPERATURE: 97.8 F | OXYGEN SATURATION: 100 % | RESPIRATION RATE: 18 BRPM | SYSTOLIC BLOOD PRESSURE: 127 MMHG | DIASTOLIC BLOOD PRESSURE: 63 MMHG

## 2023-10-06 DIAGNOSIS — K08.89 PAIN, DENTAL: ICD-10-CM

## 2023-10-06 DIAGNOSIS — O46.90 VAGINAL BLEEDING IN PREGNANCY: Primary | ICD-10-CM

## 2023-10-06 LAB
ABO + RH BLD: NORMAL
ALBUMIN SERPL-MCNC: 4.3 G/DL (ref 3.5–5.2)
ALP SERPL-CCNC: 52 U/L (ref 35–104)
ALT SERPL-CCNC: 21 U/L (ref 0–32)
ANION GAP SERPL CALCULATED.3IONS-SCNC: 14 MMOL/L (ref 7–16)
ARM BAND NUMBER: NORMAL
AST SERPL-CCNC: 14 U/L (ref 0–31)
B-HCG SERPL EIA 3RD IS-ACNC: 5382 MIU/ML (ref 0–7)
BACTERIA URNS QL MICRO: ABNORMAL
BASOPHILS # BLD: 0.04 K/UL (ref 0–0.2)
BASOPHILS NFR BLD: 1 % (ref 0–2)
BILIRUB SERPL-MCNC: <0.2 MG/DL (ref 0–1.2)
BILIRUB UR QL STRIP: NEGATIVE
BLOOD BANK SAMPLE EXPIRATION: NORMAL
BLOOD GROUP ANTIBODIES SERPL: NEGATIVE
BUN SERPL-MCNC: 14 MG/DL (ref 6–20)
CALCIUM SERPL-MCNC: 9.2 MG/DL (ref 8.6–10.2)
CHLORIDE SERPL-SCNC: 107 MMOL/L (ref 98–107)
CLARITY UR: CLEAR
CO2 SERPL-SCNC: 19 MMOL/L (ref 22–29)
COLOR UR: YELLOW
CREAT SERPL-MCNC: 0.6 MG/DL (ref 0.5–1)
CRYSTALS URNS MICRO: ABNORMAL /HPF
EOSINOPHIL # BLD: 0.4 K/UL (ref 0.05–0.5)
EOSINOPHILS RELATIVE PERCENT: 5 % (ref 0–6)
EPI CELLS #/AREA URNS HPF: ABNORMAL /HPF
ERYTHROCYTE [DISTWIDTH] IN BLOOD BY AUTOMATED COUNT: 12.7 % (ref 11.5–15)
GFR SERPL CREATININE-BSD FRML MDRD: >60 ML/MIN/1.73M2
GLUCOSE SERPL-MCNC: 95 MG/DL (ref 74–99)
GLUCOSE UR STRIP-MCNC: NEGATIVE MG/DL
HCT VFR BLD AUTO: 38.7 % (ref 34–48)
HGB BLD-MCNC: 12.4 G/DL (ref 11.5–15.5)
HGB UR QL STRIP.AUTO: ABNORMAL
IMM GRANULOCYTES # BLD AUTO: 0.03 K/UL (ref 0–0.58)
IMM GRANULOCYTES NFR BLD: 0 % (ref 0–5)
KETONES UR STRIP-MCNC: NEGATIVE MG/DL
LEUKOCYTE ESTERASE UR QL STRIP: ABNORMAL
LYMPHOCYTES NFR BLD: 2.23 K/UL (ref 1.5–4)
LYMPHOCYTES RELATIVE PERCENT: 26 % (ref 20–42)
MCH RBC QN AUTO: 28.5 PG (ref 26–35)
MCHC RBC AUTO-ENTMCNC: 32 G/DL (ref 32–34.5)
MCV RBC AUTO: 89 FL (ref 80–99.9)
MONOCYTES NFR BLD: 0.69 K/UL (ref 0.1–0.95)
MONOCYTES NFR BLD: 8 % (ref 2–12)
NEUTROPHILS NFR BLD: 61 % (ref 43–80)
NEUTS SEG NFR BLD: 5.29 K/UL (ref 1.8–7.3)
NITRITE UR QL STRIP: NEGATIVE
PH UR STRIP: 5.5 [PH] (ref 5–9)
PLATELET # BLD AUTO: 202 K/UL (ref 130–450)
PMV BLD AUTO: 11.4 FL (ref 7–12)
POTASSIUM SERPL-SCNC: 3.9 MMOL/L (ref 3.5–5)
PROT SERPL-MCNC: 7.1 G/DL (ref 6.4–8.3)
PROT UR STRIP-MCNC: NEGATIVE MG/DL
RBC # BLD AUTO: 4.35 M/UL (ref 3.5–5.5)
RBC #/AREA URNS HPF: ABNORMAL /HPF
SODIUM SERPL-SCNC: 140 MMOL/L (ref 132–146)
SP GR UR STRIP: >1.03 (ref 1–1.03)
UROBILINOGEN UR STRIP-ACNC: 0.2 EU/DL (ref 0–1)
WBC #/AREA URNS HPF: ABNORMAL /HPF
WBC OTHER # BLD: 8.7 K/UL (ref 4.5–11.5)

## 2023-10-06 PROCEDURE — 6370000000 HC RX 637 (ALT 250 FOR IP): Performed by: NURSE PRACTITIONER

## 2023-10-06 PROCEDURE — 76801 OB US < 14 WKS SINGLE FETUS: CPT

## 2023-10-06 PROCEDURE — 36415 COLL VENOUS BLD VENIPUNCTURE: CPT

## 2023-10-06 RX ORDER — CEPHALEXIN 500 MG/1
500 CAPSULE ORAL 3 TIMES DAILY
Qty: 21 CAPSULE | Refills: 0 | Status: SHIPPED | OUTPATIENT
Start: 2023-10-06 | End: 2023-10-13

## 2023-10-06 RX ORDER — ACETAMINOPHEN 500 MG
500 TABLET ORAL 4 TIMES DAILY PRN
Qty: 120 TABLET | Refills: 0 | Status: SHIPPED | OUTPATIENT
Start: 2023-10-06

## 2023-10-06 RX ADMIN — ACETAMINOPHEN 650 MG: 325 TABLET ORAL at 00:04

## 2023-10-06 NOTE — ED NOTES
Was informed that the patient needed a Stat US to r/o ectopic.   Ute Mukherjee was called at [facility] and ED was informed that she is on her way

## 2023-10-06 NOTE — ED NOTES
Patient given discharge paperwork and verbalized understanding of discharge instructions, medications, and follow ups from today's visit.        Jaxon Kaminski RN  10/06/23 7034

## 2023-10-11 ENCOUNTER — APPOINTMENT (OUTPATIENT)
Dept: ULTRASOUND IMAGING | Age: 38
End: 2023-10-11
Payer: COMMERCIAL

## 2023-10-11 ENCOUNTER — HOSPITAL ENCOUNTER (EMERGENCY)
Age: 38
Discharge: LEFT BEFORE DISCHARGE | End: 2023-10-11
Attending: STUDENT IN AN ORGANIZED HEALTH CARE EDUCATION/TRAINING PROGRAM
Payer: COMMERCIAL

## 2023-10-11 VITALS
WEIGHT: 210 LBS | RESPIRATION RATE: 14 BRPM | HEIGHT: 69 IN | SYSTOLIC BLOOD PRESSURE: 110 MMHG | HEART RATE: 86 BPM | TEMPERATURE: 98.7 F | DIASTOLIC BLOOD PRESSURE: 54 MMHG | OXYGEN SATURATION: 98 % | BODY MASS INDEX: 31.1 KG/M2

## 2023-10-11 DIAGNOSIS — O03.4 INCOMPLETE MISCARRIAGE: Primary | ICD-10-CM

## 2023-10-11 LAB
ABO + RH BLD: NORMAL
ALBUMIN SERPL-MCNC: 4.3 G/DL (ref 3.5–5.2)
ALP SERPL-CCNC: 44 U/L (ref 35–104)
ALT SERPL-CCNC: 23 U/L (ref 0–32)
ANION GAP SERPL CALCULATED.3IONS-SCNC: 8 MMOL/L (ref 7–16)
ARM BAND NUMBER: NORMAL
AST SERPL-CCNC: 22 U/L (ref 0–31)
B-HCG SERPL EIA 3RD IS-ACNC: 1836 MIU/ML (ref 0–7)
BASOPHILS # BLD: 0.05 K/UL (ref 0–0.2)
BASOPHILS NFR BLD: 1 % (ref 0–2)
BILIRUB SERPL-MCNC: 0.3 MG/DL (ref 0–1.2)
BILIRUB UR QL STRIP: NEGATIVE
BLOOD BANK SAMPLE EXPIRATION: NORMAL
BLOOD GROUP ANTIBODIES SERPL: NEGATIVE
BUN SERPL-MCNC: 16 MG/DL (ref 6–20)
CALCIUM SERPL-MCNC: 8.3 MG/DL (ref 8.6–10.2)
CHLORIDE SERPL-SCNC: 106 MMOL/L (ref 98–107)
CLARITY UR: CLEAR
CO2 SERPL-SCNC: 21 MMOL/L (ref 22–29)
COLOR UR: YELLOW
CREAT SERPL-MCNC: 0.6 MG/DL (ref 0.5–1)
EOSINOPHIL # BLD: 0.24 K/UL (ref 0.05–0.5)
EOSINOPHILS RELATIVE PERCENT: 3 % (ref 0–6)
ERYTHROCYTE [DISTWIDTH] IN BLOOD BY AUTOMATED COUNT: 12.5 % (ref 11.5–15)
GFR SERPL CREATININE-BSD FRML MDRD: >60 ML/MIN/1.73M2
GLUCOSE SERPL-MCNC: 98 MG/DL (ref 74–99)
GLUCOSE UR STRIP-MCNC: NEGATIVE MG/DL
HCT VFR BLD AUTO: 35 % (ref 34–48)
HGB BLD-MCNC: 11.6 G/DL (ref 11.5–15.5)
HGB UR QL STRIP.AUTO: ABNORMAL
IMM GRANULOCYTES # BLD AUTO: 0.04 K/UL (ref 0–0.58)
IMM GRANULOCYTES NFR BLD: 0 % (ref 0–5)
KETONES UR STRIP-MCNC: NEGATIVE MG/DL
LEUKOCYTE ESTERASE UR QL STRIP: NEGATIVE
LYMPHOCYTES NFR BLD: 1.76 K/UL (ref 1.5–4)
LYMPHOCYTES RELATIVE PERCENT: 19 % (ref 20–42)
MCH RBC QN AUTO: 29.1 PG (ref 26–35)
MCHC RBC AUTO-ENTMCNC: 33.1 G/DL (ref 32–34.5)
MCV RBC AUTO: 87.9 FL (ref 80–99.9)
MONOCYTES NFR BLD: 0.43 K/UL (ref 0.1–0.95)
MONOCYTES NFR BLD: 5 % (ref 2–12)
MUCOUS THREADS URNS QL MICRO: PRESENT
NEUTROPHILS NFR BLD: 73 % (ref 43–80)
NEUTS SEG NFR BLD: 6.82 K/UL (ref 1.8–7.3)
NITRITE UR QL STRIP: NEGATIVE
PH UR STRIP: 6 [PH] (ref 5–9)
PLATELET # BLD AUTO: 191 K/UL (ref 130–450)
PMV BLD AUTO: 11.3 FL (ref 7–12)
POTASSIUM SERPL-SCNC: 3.8 MMOL/L (ref 3.5–5)
PROT SERPL-MCNC: 6.5 G/DL (ref 6.4–8.3)
PROT UR STRIP-MCNC: NEGATIVE MG/DL
RBC # BLD AUTO: 3.98 M/UL (ref 3.5–5.5)
RBC #/AREA URNS HPF: ABNORMAL /HPF
SODIUM SERPL-SCNC: 135 MMOL/L (ref 132–146)
SP GR UR STRIP: >1.03 (ref 1–1.03)
UROBILINOGEN UR STRIP-ACNC: 0.2 EU/DL (ref 0–1)
WBC #/AREA URNS HPF: ABNORMAL /HPF
WBC OTHER # BLD: 9.3 K/UL (ref 4.5–11.5)

## 2023-10-11 PROCEDURE — 6370000000 HC RX 637 (ALT 250 FOR IP): Performed by: STUDENT IN AN ORGANIZED HEALTH CARE EDUCATION/TRAINING PROGRAM

## 2023-10-11 PROCEDURE — 81001 URINALYSIS AUTO W/SCOPE: CPT

## 2023-10-11 PROCEDURE — 87086 URINE CULTURE/COLONY COUNT: CPT

## 2023-10-11 PROCEDURE — 86900 BLOOD TYPING SEROLOGIC ABO: CPT

## 2023-10-11 PROCEDURE — 84702 CHORIONIC GONADOTROPIN TEST: CPT

## 2023-10-11 PROCEDURE — 99284 EMERGENCY DEPT VISIT MOD MDM: CPT

## 2023-10-11 PROCEDURE — 86901 BLOOD TYPING SEROLOGIC RH(D): CPT

## 2023-10-11 PROCEDURE — 36415 COLL VENOUS BLD VENIPUNCTURE: CPT

## 2023-10-11 PROCEDURE — 86850 RBC ANTIBODY SCREEN: CPT

## 2023-10-11 PROCEDURE — 85025 COMPLETE CBC W/AUTO DIFF WBC: CPT

## 2023-10-11 PROCEDURE — 80053 COMPREHEN METABOLIC PANEL: CPT

## 2023-10-11 PROCEDURE — 76801 OB US < 14 WKS SINGLE FETUS: CPT

## 2023-10-11 PROCEDURE — 2580000003 HC RX 258: Performed by: STUDENT IN AN ORGANIZED HEALTH CARE EDUCATION/TRAINING PROGRAM

## 2023-10-11 RX ORDER — ACETAMINOPHEN 500 MG
1000 TABLET ORAL ONCE
Status: COMPLETED | OUTPATIENT
Start: 2023-10-11 | End: 2023-10-11

## 2023-10-11 RX ORDER — 0.9 % SODIUM CHLORIDE 0.9 %
1000 INTRAVENOUS SOLUTION INTRAVENOUS ONCE
Status: COMPLETED | OUTPATIENT
Start: 2023-10-11 | End: 2023-10-11

## 2023-10-11 RX ADMIN — ACETAMINOPHEN 1000 MG: 500 TABLET ORAL at 16:58

## 2023-10-11 RX ADMIN — SODIUM CHLORIDE 1000 ML: 9 INJECTION, SOLUTION INTRAVENOUS at 15:47

## 2023-10-11 ASSESSMENT — LIFESTYLE VARIABLES
HOW OFTEN DO YOU HAVE A DRINK CONTAINING ALCOHOL: NEVER
HOW MANY STANDARD DRINKS CONTAINING ALCOHOL DO YOU HAVE ON A TYPICAL DAY: PATIENT DOES NOT DRINK

## 2023-10-11 ASSESSMENT — PAIN DESCRIPTION - ORIENTATION
ORIENTATION: RIGHT;LEFT;LOWER
ORIENTATION: RIGHT;LEFT;LOWER

## 2023-10-11 ASSESSMENT — PAIN DESCRIPTION - FREQUENCY: FREQUENCY: CONTINUOUS

## 2023-10-11 ASSESSMENT — PAIN DESCRIPTION - LOCATION
LOCATION: ABDOMEN
LOCATION: ABDOMEN

## 2023-10-11 ASSESSMENT — PAIN DESCRIPTION - PAIN TYPE: TYPE: ACUTE PAIN

## 2023-10-11 ASSESSMENT — PAIN SCALES - GENERAL: PAINLEVEL_OUTOF10: 10

## 2023-10-11 ASSESSMENT — PAIN DESCRIPTION - DESCRIPTORS
DESCRIPTORS: CRAMPING
DESCRIPTORS: CRAMPING

## 2023-10-11 ASSESSMENT — PAIN - FUNCTIONAL ASSESSMENT: PAIN_FUNCTIONAL_ASSESSMENT: 0-10

## 2023-10-11 NOTE — ED PROVIDER NOTES
been advised that they should return to the ED immediately if they change their mind at any time, or if their condition begins to change or worsen. CONSULTS: (Who and What was discussed)  None        I am the Primary Clinician of Record. FINAL IMPRESSION      1.  Incomplete miscarriage          DISPOSITION/PLAN     DISPOSITION [place] 10/11/2023 07:06:12 PM      PATIENT REFERRED TO:  Geovanna Marie MD  [address] [place] [address]  [phone]    Schedule an appointment as soon as possible for a visit         DISCHARGE MEDICATIONS:  Discharge Medication List as of 10/11/2023  7:29 PM          DISCONTINUED MEDICATIONS:  Discharge Medication List as of 10/11/2023  7:29 PM                 (Please note that portions of this note were completed with a voice recognition program.  Efforts were made to edit the dictations but occasionally words are mis-transcribed.)    Loly Wagner DO (electronically signed)           Loly Wagner DO  10/12/23 0210

## 2023-10-12 LAB
MICROORGANISM SPEC CULT: NO GROWTH
SPECIMEN DESCRIPTION: NORMAL

## 2024-01-22 ENCOUNTER — APPOINTMENT (OUTPATIENT)
Dept: CT IMAGING | Age: 39
End: 2024-01-22
Payer: COMMERCIAL

## 2024-01-22 ENCOUNTER — HOSPITAL ENCOUNTER (EMERGENCY)
Age: 39
Discharge: HOME | End: 2024-01-22
Attending: EMERGENCY MEDICINE
Payer: COMMERCIAL

## 2024-01-22 VITALS
WEIGHT: 221 LBS | RESPIRATION RATE: 20 BRPM | DIASTOLIC BLOOD PRESSURE: 74 MMHG | HEART RATE: 92 BPM | SYSTOLIC BLOOD PRESSURE: 138 MMHG | BODY MASS INDEX: 32.64 KG/M2 | OXYGEN SATURATION: 100 % | TEMPERATURE: 98.2 F

## 2024-01-22 DIAGNOSIS — R10.9 LEFT FLANK PAIN: Primary | ICD-10-CM

## 2024-01-22 LAB
ALBUMIN SERPL-MCNC: 4.1 G/DL (ref 3.5–5.2)
ALP SERPL-CCNC: 58 U/L (ref 35–104)
ALT SERPL-CCNC: 10 U/L (ref 0–32)
ANION GAP SERPL CALCULATED.3IONS-SCNC: 9 MMOL/L (ref 7–16)
AST SERPL-CCNC: 12 U/L (ref 0–31)
BACTERIA URNS QL MICRO: ABNORMAL
BASOPHILS # BLD: 0.05 K/UL (ref 0–0.2)
BASOPHILS NFR BLD: 1 % (ref 0–2)
BILIRUB SERPL-MCNC: 0.2 MG/DL (ref 0–1.2)
BILIRUB UR QL STRIP: NEGATIVE
BUN SERPL-MCNC: 15 MG/DL (ref 6–20)
CALCIUM SERPL-MCNC: 9.2 MG/DL (ref 8.6–10.2)
CHLORIDE SERPL-SCNC: 107 MMOL/L (ref 98–107)
CLARITY UR: CLEAR
CO2 SERPL-SCNC: 24 MMOL/L (ref 22–29)
COLOR UR: YELLOW
CREAT SERPL-MCNC: 0.8 MG/DL (ref 0.5–1)
EOSINOPHIL # BLD: 0.17 K/UL (ref 0.05–0.5)
EOSINOPHILS RELATIVE PERCENT: 2 % (ref 0–6)
EPI CELLS #/AREA URNS HPF: ABNORMAL /HPF
ERYTHROCYTE [DISTWIDTH] IN BLOOD BY AUTOMATED COUNT: 13.4 % (ref 11.5–15)
GFR SERPL CREATININE-BSD FRML MDRD: >60 ML/MIN/1.73M2
GLUCOSE SERPL-MCNC: 101 MG/DL (ref 74–99)
GLUCOSE UR STRIP-MCNC: NEGATIVE MG/DL
HCG, URINE, POC: NEGATIVE
HCT VFR BLD AUTO: 42.3 % (ref 34–48)
HGB BLD-MCNC: 13.7 G/DL (ref 11.5–15.5)
HGB UR QL STRIP.AUTO: NEGATIVE
IMM GRANULOCYTES # BLD AUTO: 0.03 K/UL (ref 0–0.58)
IMM GRANULOCYTES NFR BLD: 0 % (ref 0–5)
KETONES UR STRIP-MCNC: NEGATIVE MG/DL
LACTATE BLDV-SCNC: 1 MMOL/L (ref 0.5–2.2)
LEUKOCYTE ESTERASE UR QL STRIP: NEGATIVE
LIPASE SERPL-CCNC: 21 U/L (ref 13–60)
LYMPHOCYTES NFR BLD: 1.96 K/UL (ref 1.5–4)
LYMPHOCYTES RELATIVE PERCENT: 19 % (ref 20–42)
Lab: NORMAL
MCH RBC QN AUTO: 27.7 PG (ref 26–35)
MCHC RBC AUTO-ENTMCNC: 32.4 G/DL (ref 32–34.5)
MCV RBC AUTO: 85.6 FL (ref 80–99.9)
MONOCYTES NFR BLD: 0.49 K/UL (ref 0.1–0.95)
MONOCYTES NFR BLD: 5 % (ref 2–12)
MUCOUS THREADS URNS QL MICRO: PRESENT
NEGATIVE QC PASS/FAIL: NORMAL
NEUTROPHILS NFR BLD: 73 % (ref 43–80)
NEUTS SEG NFR BLD: 7.45 K/UL (ref 1.8–7.3)
NITRITE UR QL STRIP: NEGATIVE
PH UR STRIP: 6 [PH] (ref 5–9)
PLATELET # BLD AUTO: 266 K/UL (ref 130–450)
PMV BLD AUTO: 11.1 FL (ref 7–12)
POSITIVE QC PASS/FAIL: NORMAL
POTASSIUM SERPL-SCNC: 3.7 MMOL/L (ref 3.5–5)
PROT SERPL-MCNC: 6.5 G/DL (ref 6.4–8.3)
PROT UR STRIP-MCNC: NEGATIVE MG/DL
RBC # BLD AUTO: 4.94 M/UL (ref 3.5–5.5)
RBC #/AREA URNS HPF: ABNORMAL /HPF
SODIUM SERPL-SCNC: 140 MMOL/L (ref 132–146)
SP GR UR STRIP: 1.02 (ref 1–1.03)
UROBILINOGEN UR STRIP-ACNC: 0.2 EU/DL (ref 0–1)
WBC #/AREA URNS HPF: ABNORMAL /HPF
WBC OTHER # BLD: 10.2 K/UL (ref 4.5–11.5)
YEAST URNS QL MICRO: PRESENT

## 2024-01-22 PROCEDURE — 96375 TX/PRO/DX INJ NEW DRUG ADDON: CPT

## 2024-01-22 PROCEDURE — 2580000003 HC RX 258: Performed by: EMERGENCY MEDICINE

## 2024-01-22 PROCEDURE — 83690 ASSAY OF LIPASE: CPT

## 2024-01-22 PROCEDURE — 96374 THER/PROPH/DIAG INJ IV PUSH: CPT

## 2024-01-22 PROCEDURE — 87106 FUNGI IDENTIFICATION YEAST: CPT

## 2024-01-22 PROCEDURE — 85025 COMPLETE CBC W/AUTO DIFF WBC: CPT

## 2024-01-22 PROCEDURE — 99284 EMERGENCY DEPT VISIT MOD MDM: CPT

## 2024-01-22 PROCEDURE — 81001 URINALYSIS AUTO W/SCOPE: CPT

## 2024-01-22 PROCEDURE — 6360000002 HC RX W HCPCS: Performed by: EMERGENCY MEDICINE

## 2024-01-22 PROCEDURE — 80053 COMPREHEN METABOLIC PANEL: CPT

## 2024-01-22 PROCEDURE — 83605 ASSAY OF LACTIC ACID: CPT

## 2024-01-22 PROCEDURE — 87086 URINE CULTURE/COLONY COUNT: CPT

## 2024-01-22 PROCEDURE — 74176 CT ABD & PELVIS W/O CONTRAST: CPT

## 2024-01-22 RX ORDER — KETOROLAC TROMETHAMINE 30 MG/ML
30 INJECTION, SOLUTION INTRAMUSCULAR; INTRAVENOUS ONCE
Status: COMPLETED | OUTPATIENT
Start: 2024-01-22 | End: 2024-01-22

## 2024-01-22 RX ORDER — 0.9 % SODIUM CHLORIDE 0.9 %
500 INTRAVENOUS SOLUTION INTRAVENOUS ONCE
Status: COMPLETED | OUTPATIENT
Start: 2024-01-22 | End: 2024-01-22

## 2024-01-22 RX ORDER — FENTANYL CITRATE 0.05 MG/ML
50 INJECTION, SOLUTION INTRAMUSCULAR; INTRAVENOUS ONCE
Status: COMPLETED | OUTPATIENT
Start: 2024-01-22 | End: 2024-01-22

## 2024-01-22 RX ADMIN — FENTANYL CITRATE 50 MCG: 0.05 INJECTION, SOLUTION INTRAMUSCULAR; INTRAVENOUS at 16:34

## 2024-01-22 RX ADMIN — KETOROLAC TROMETHAMINE 30 MG: 30 INJECTION, SOLUTION INTRAMUSCULAR; INTRAVENOUS at 15:04

## 2024-01-22 RX ADMIN — SODIUM CHLORIDE 500 ML: 9 INJECTION, SOLUTION INTRAVENOUS at 15:03

## 2024-01-22 ASSESSMENT — PAIN SCALES - GENERAL
PAINLEVEL_OUTOF10: 8
PAINLEVEL_OUTOF10: 10

## 2024-01-22 ASSESSMENT — PAIN DESCRIPTION - FREQUENCY: FREQUENCY: INTERMITTENT

## 2024-01-22 ASSESSMENT — PAIN DESCRIPTION - LOCATION
LOCATION: FLANK
LOCATION: FLANK

## 2024-01-22 ASSESSMENT — LIFESTYLE VARIABLES: HOW MANY STANDARD DRINKS CONTAINING ALCOHOL DO YOU HAVE ON A TYPICAL DAY: PATIENT DOES NOT DRINK

## 2024-01-22 ASSESSMENT — PAIN DESCRIPTION - ORIENTATION
ORIENTATION: RIGHT
ORIENTATION: RIGHT

## 2024-01-22 ASSESSMENT — PAIN - FUNCTIONAL ASSESSMENT: PAIN_FUNCTIONAL_ASSESSMENT: 0-10

## 2024-01-22 ASSESSMENT — PAIN DESCRIPTION - DESCRIPTORS: DESCRIPTORS: DULL;SHARP

## 2024-01-22 ASSESSMENT — PAIN DESCRIPTION - PAIN TYPE: TYPE: ACUTE PAIN

## 2024-01-22 NOTE — ED PROVIDER NOTES
[facility] EMERGENCY DEPARTMENT  EMERGENCY DEPARTMENT ENCOUNTER      Pt Name: Tawana Limon  MRN: [id number]  Birthdate 1985  Date of evaluation: 1/22/2024  Provider: Papito Long DO  PCP: No primary care provider on file.  Note Started: 3:09 PM EST 1/22/24    CHIEF COMPLAINT       Chief Complaint   Patient presents with    Flank Pain     RT SIDED FLANK PAIN/ VOMITING/ HX KIDNEY STONES/ PAIN STARTED SAT DENIES HEMATURIA       HISTORY OF PRESENT ILLNESS: 1 or more Elements   History From: Patient  Limitations to history : None    Tawana Limon is a 38 y.o. female who presents to the emerged part for evaluation of flank pain.  Patient states that on Saturday developed pain to her right flank. Patient states that this pain seems to come and go. Patient also complains of hematuria. Patient has no fevers or chills. She has no chest pain or shortness of breath. Patient has no other reported mitigating or worsening factors.    Nursing Notes were all reviewed and agreed with or any disagreements were addressed in the HPI.    REVIEW OF SYSTEMS :    Positives and Pertinent negatives as per HPI.     SURGICAL HISTORY     Past Surgical History:   Procedure Laterality Date    DILATION AND CURETTAGE OF UTERUS N/A 7/6/2019    DILATATION AND CURETTAGE SUCTION performed by Clayton Head MD at [facility] OR       CURRENTMEDICATIONS       Discharge Medication List as of 1/22/2024  5:52 PM        CONTINUE these medications which have NOT CHANGED    Details   acetaminophen (TYLENOL) 500 MG tablet Take 1 tablet by mouth 4 times daily as needed for Pain, Disp-120 tablet, R-0Normal             ALLERGIES     Penicillins and Vicodin [hydrocodone-acetaminophen]    FAMILYHISTORY       Family History   Problem Relation Age of Onset    Cancer Mother     Breast Cancer Mother     Arthritis Father     Diabetes Father     Heart Attack Father     Heart Disease Father     High Blood Pressure Maternal Grandmother

## 2024-01-24 LAB
MICROORGANISM SPEC CULT: ABNORMAL
SPECIMEN DESCRIPTION: ABNORMAL

## 2024-01-24 RX ORDER — FLUCONAZOLE 150 MG/1
150 TABLET ORAL ONCE
Qty: 1 TABLET | Refills: 0 | Status: SHIPPED | OUTPATIENT
Start: 2024-01-24 | End: 2024-01-24

## 2024-01-24 NOTE — PROGRESS NOTES
After-hours results review significant for urince culture yielding Candida.    Patient was contacted and notified of result. One time dose of diflucan sent to pharmacy.      Alvino Wheeler, PharmD 1/24/2024 1:40 PM   [phone]

## 2024-09-09 ENCOUNTER — APPOINTMENT (OUTPATIENT)
Dept: GENERAL RADIOLOGY | Age: 39
End: 2024-09-09
Payer: COMMERCIAL

## 2024-09-09 ENCOUNTER — HOSPITAL ENCOUNTER (EMERGENCY)
Age: 39
Discharge: HOME | End: 2024-09-09
Payer: COMMERCIAL

## 2024-09-09 VITALS
WEIGHT: 230 LBS | HEART RATE: 82 BPM | HEIGHT: 69 IN | BODY MASS INDEX: 34.07 KG/M2 | RESPIRATION RATE: 16 BRPM | DIASTOLIC BLOOD PRESSURE: 73 MMHG | OXYGEN SATURATION: 99 % | TEMPERATURE: 97.8 F | SYSTOLIC BLOOD PRESSURE: 120 MMHG

## 2024-09-09 DIAGNOSIS — M62.838 SPASM OF MUSCLE: ICD-10-CM

## 2024-09-09 DIAGNOSIS — S46.811A STRAIN OF RIGHT TRAPEZIUS MUSCLE, INITIAL ENCOUNTER: ICD-10-CM

## 2024-09-09 DIAGNOSIS — M25.511 ACUTE PAIN OF RIGHT SHOULDER: Primary | ICD-10-CM

## 2024-09-09 PROCEDURE — 96372 THER/PROPH/DIAG INJ SC/IM: CPT

## 2024-09-09 PROCEDURE — 73030 X-RAY EXAM OF SHOULDER: CPT

## 2024-09-09 PROCEDURE — 6360000002 HC RX W HCPCS: Performed by: PHYSICIAN ASSISTANT

## 2024-09-09 PROCEDURE — 99284 EMERGENCY DEPT VISIT MOD MDM: CPT

## 2024-09-09 RX ORDER — PREDNISONE 10 MG/1
TABLET ORAL
Qty: 20 TABLET | Refills: 0 | Status: SHIPPED | OUTPATIENT
Start: 2024-09-09 | End: 2024-09-19

## 2024-09-09 RX ORDER — KETOROLAC TROMETHAMINE 30 MG/ML
30 INJECTION, SOLUTION INTRAMUSCULAR; INTRAVENOUS ONCE
Status: COMPLETED | OUTPATIENT
Start: 2024-09-09 | End: 2024-09-09

## 2024-09-09 RX ORDER — OXYCODONE AND ACETAMINOPHEN 5; 325 MG/1; MG/1
1 TABLET ORAL EVERY 6 HOURS PRN
Qty: 10 TABLET | Refills: 0 | Status: SHIPPED | OUTPATIENT
Start: 2024-09-09 | End: 2024-09-12

## 2024-09-09 RX ORDER — DEXAMETHASONE SODIUM PHOSPHATE 10 MG/ML
10 INJECTION INTRAMUSCULAR; INTRAVENOUS ONCE
Status: COMPLETED | OUTPATIENT
Start: 2024-09-09 | End: 2024-09-09

## 2024-09-09 RX ORDER — CYCLOBENZAPRINE HCL 10 MG
10 TABLET ORAL 3 TIMES DAILY PRN
Qty: 21 TABLET | Refills: 0 | Status: SHIPPED | OUTPATIENT
Start: 2024-09-09 | End: 2024-09-19

## 2024-09-09 RX ADMIN — KETOROLAC TROMETHAMINE 30 MG: 30 INJECTION, SOLUTION INTRAMUSCULAR at 09:42

## 2024-09-09 RX ADMIN — DEXAMETHASONE SODIUM PHOSPHATE 10 MG: 10 INJECTION INTRAMUSCULAR; INTRAVENOUS at 09:43

## 2024-09-09 ASSESSMENT — PAIN SCALES - GENERAL
PAINLEVEL_OUTOF10: 9
PAINLEVEL_OUTOF10: 9

## 2024-09-09 ASSESSMENT — LIFESTYLE VARIABLES
HOW MANY STANDARD DRINKS CONTAINING ALCOHOL DO YOU HAVE ON A TYPICAL DAY: PATIENT DOES NOT DRINK
HOW OFTEN DO YOU HAVE A DRINK CONTAINING ALCOHOL: NEVER

## 2024-09-09 ASSESSMENT — PAIN DESCRIPTION - PAIN TYPE: TYPE: ACUTE PAIN

## 2024-09-09 ASSESSMENT — PAIN DESCRIPTION - LOCATION: LOCATION: SHOULDER

## 2024-09-09 ASSESSMENT — PAIN DESCRIPTION - DESCRIPTORS: DESCRIPTORS: DISCOMFORT

## 2024-09-09 ASSESSMENT — PAIN DESCRIPTION - ORIENTATION: ORIENTATION: RIGHT

## 2024-09-09 ASSESSMENT — PAIN DESCRIPTION - FREQUENCY: FREQUENCY: CONTINUOUS

## 2024-11-05 ENCOUNTER — HOSPITAL ENCOUNTER (EMERGENCY)
Age: 39
Discharge: HOME | End: 2024-11-05
Payer: COMMERCIAL

## 2024-11-05 VITALS
HEART RATE: 86 BPM | BODY MASS INDEX: 37.77 KG/M2 | OXYGEN SATURATION: 98 % | TEMPERATURE: 98 F | HEIGHT: 69 IN | RESPIRATION RATE: 18 BRPM | DIASTOLIC BLOOD PRESSURE: 82 MMHG | SYSTOLIC BLOOD PRESSURE: 134 MMHG | WEIGHT: 255 LBS

## 2024-11-05 DIAGNOSIS — G89.29 CHRONIC RIGHT SHOULDER PAIN: Primary | ICD-10-CM

## 2024-11-05 DIAGNOSIS — M25.511 CHRONIC RIGHT SHOULDER PAIN: Primary | ICD-10-CM

## 2024-11-05 PROCEDURE — 6360000002 HC RX W HCPCS: Performed by: NURSE PRACTITIONER

## 2024-11-05 PROCEDURE — 96372 THER/PROPH/DIAG INJ SC/IM: CPT

## 2024-11-05 PROCEDURE — 6370000000 HC RX 637 (ALT 250 FOR IP): Performed by: NURSE PRACTITIONER

## 2024-11-05 PROCEDURE — 99284 EMERGENCY DEPT VISIT MOD MDM: CPT

## 2024-11-05 RX ORDER — PREDNISONE 10 MG/1
TABLET ORAL
Qty: 14 TABLET | Refills: 0 | Status: SHIPPED | OUTPATIENT
Start: 2024-11-05

## 2024-11-05 RX ORDER — ORPHENADRINE CITRATE 30 MG/ML
60 INJECTION INTRAMUSCULAR; INTRAVENOUS ONCE
Status: COMPLETED | OUTPATIENT
Start: 2024-11-05 | End: 2024-11-05

## 2024-11-05 RX ORDER — CYCLOBENZAPRINE HCL 10 MG
10 TABLET ORAL 3 TIMES DAILY PRN
Qty: 21 TABLET | Refills: 0 | Status: SHIPPED | OUTPATIENT
Start: 2024-11-05 | End: 2024-11-15

## 2024-11-05 RX ORDER — LIDOCAINE 50 MG/G
1 PATCH TOPICAL EVERY 24 HOURS
Qty: 5 PATCH | Refills: 0 | Status: SHIPPED | OUTPATIENT
Start: 2024-11-05 | End: 2024-11-10

## 2024-11-05 RX ORDER — OXYCODONE AND ACETAMINOPHEN 5; 325 MG/1; MG/1
1 TABLET ORAL ONCE
Status: COMPLETED | OUTPATIENT
Start: 2024-11-05 | End: 2024-11-05

## 2024-11-05 RX ORDER — DEXAMETHASONE SODIUM PHOSPHATE 10 MG/ML
10 INJECTION INTRAMUSCULAR; INTRAVENOUS ONCE
Status: COMPLETED | OUTPATIENT
Start: 2024-11-05 | End: 2024-11-05

## 2024-11-05 RX ORDER — LIDOCAINE 4 G/G
1 PATCH TOPICAL ONCE
Status: DISCONTINUED | OUTPATIENT
Start: 2024-11-05 | End: 2024-11-05 | Stop reason: HOSPADM

## 2024-11-05 RX ADMIN — OXYCODONE HYDROCHLORIDE AND ACETAMINOPHEN 1 TABLET: 5; 325 TABLET ORAL at 19:40

## 2024-11-05 RX ADMIN — ORPHENADRINE CITRATE 60 MG: 60 INJECTION INTRAMUSCULAR; INTRAVENOUS at 19:40

## 2024-11-05 RX ADMIN — DEXAMETHASONE SODIUM PHOSPHATE 10 MG: 10 INJECTION INTRAMUSCULAR; INTRAVENOUS at 19:40

## 2024-11-06 NOTE — ED PROVIDER NOTES
Independent HARMEET Visit.            [facility]  Department of Emergency Medicine   ED  Encounter Note  Admit Date/RoomTime: 2024  7:46 PM  ED Room: WAITING RESULTS/WAITING *    NAME: Tawana Limon  : 1985  MRN: [id number]     Chief Complaint:  Shoulder Injury (Shoulder pain old injury)    History of Present Illness       Tawana Limon is a 39 y.o. old female who presents to the emergency department for evaluation of chronic shoulder pain.  She follows with Dr. Perez and is following up an appointment with him on Monday for her results of her MRI.  She was evaluated here in September and states she ran out of her muscle relaxant which was helping her tolerate the pain.  She has been taking ibuprofen without the muscle relaxant and it is not helping her with her chronic pain.  She denies any new injury.  There is no associated syncope, chest pain, shortness of breath, nausea, vomiting, diaphoresis or new symptoms.  She has not noticed any limited movements, fever, chills, myalgias or malaise nor has she noticed any rash or redness to the joint.  She states, \"this is my typical pain and nothing new about it.\"  Repetitious movements and going to work does not improve her discomfort.  She is looking for something for discomfort and pain until she sees her orthopedist on Monday.    ROS   Pertinent positives and negatives are stated within HPI, all other systems reviewed and are negative.    Past Medical History:  has a past medical history of 28 weeks gestation of pregnancy, 35 weeks gestation of pregnancy, 38 weeks gestation of pregnancy, 39 weeks gestation of pregnancy, Abnormal Pap smear of cervix, Chronic kidney disease, Knee effusion, Mental disorder, Postpartum depression, Pyelonephritis of right kidney,  (spontaneous vaginal delivery), and Urinary tract infection in mother during second trimester of pregnancy.    Surgical History:  has a past surgical history that includes Dilation

## 2024-11-30 ENCOUNTER — HOSPITAL ENCOUNTER (EMERGENCY)
Age: 39
Discharge: HOME | End: 2024-11-30
Payer: COMMERCIAL

## 2024-11-30 VITALS
OXYGEN SATURATION: 100 % | BODY MASS INDEX: 37.77 KG/M2 | HEART RATE: 98 BPM | WEIGHT: 255 LBS | RESPIRATION RATE: 20 BRPM | DIASTOLIC BLOOD PRESSURE: 84 MMHG | HEIGHT: 69 IN | TEMPERATURE: 97.1 F | SYSTOLIC BLOOD PRESSURE: 126 MMHG

## 2024-11-30 DIAGNOSIS — M25.511 CHRONIC RIGHT SHOULDER PAIN: ICD-10-CM

## 2024-11-30 DIAGNOSIS — G89.29 CHRONIC RIGHT SHOULDER PAIN: ICD-10-CM

## 2024-11-30 DIAGNOSIS — M75.41 SHOULDER IMPINGEMENT SYNDROME, RIGHT: Primary | ICD-10-CM

## 2024-11-30 PROCEDURE — 6360000002 HC RX W HCPCS: Performed by: PHYSICIAN ASSISTANT

## 2024-11-30 PROCEDURE — 99284 EMERGENCY DEPT VISIT MOD MDM: CPT

## 2024-11-30 PROCEDURE — 96372 THER/PROPH/DIAG INJ SC/IM: CPT

## 2024-11-30 RX ORDER — TRAMADOL HYDROCHLORIDE 50 MG/1
50 TABLET ORAL EVERY 6 HOURS PRN
Qty: 12 TABLET | Refills: 0 | Status: SHIPPED | OUTPATIENT
Start: 2024-11-30 | End: 2024-12-03

## 2024-11-30 RX ORDER — KETOROLAC TROMETHAMINE 30 MG/ML
30 INJECTION, SOLUTION INTRAMUSCULAR; INTRAVENOUS ONCE
Status: COMPLETED | OUTPATIENT
Start: 2024-11-30 | End: 2024-11-30

## 2024-11-30 RX ORDER — CYCLOBENZAPRINE HCL 10 MG
10 TABLET ORAL 2 TIMES DAILY PRN
Qty: 30 TABLET | Refills: 0 | Status: SHIPPED | OUTPATIENT
Start: 2024-11-30

## 2024-11-30 RX ORDER — PREDNISONE 20 MG/1
40 TABLET ORAL DAILY
Qty: 10 TABLET | Refills: 0 | Status: SHIPPED | OUTPATIENT
Start: 2024-11-30 | End: 2024-12-05

## 2024-11-30 RX ADMIN — KETOROLAC TROMETHAMINE 30 MG: 30 INJECTION, SOLUTION INTRAMUSCULAR at 12:16

## 2024-11-30 ASSESSMENT — PAIN - FUNCTIONAL ASSESSMENT
PAIN_FUNCTIONAL_ASSESSMENT: 0-10
PAIN_FUNCTIONAL_ASSESSMENT: PREVENTS OR INTERFERES SOME ACTIVE ACTIVITIES AND ADLS
PAIN_FUNCTIONAL_ASSESSMENT: PREVENTS OR INTERFERES SOME ACTIVE ACTIVITIES AND ADLS

## 2024-11-30 ASSESSMENT — PAIN SCALES - GENERAL
PAINLEVEL_OUTOF10: 10
PAINLEVEL_OUTOF10: 8

## 2024-11-30 ASSESSMENT — PAIN DESCRIPTION - ORIENTATION
ORIENTATION: RIGHT
ORIENTATION: RIGHT

## 2024-11-30 ASSESSMENT — PAIN DESCRIPTION - LOCATION
LOCATION: SHOULDER
LOCATION: SHOULDER

## 2024-11-30 ASSESSMENT — PAIN DESCRIPTION - FREQUENCY: FREQUENCY: CONTINUOUS

## 2024-11-30 ASSESSMENT — PAIN DESCRIPTION - DESCRIPTORS: DESCRIPTORS: ACHING;THROBBING

## 2024-11-30 NOTE — ED PROVIDER NOTES
Independent HARMEET Visit.     Department of Emergency Medicine   ED  Provider Note  Admit Date/RoomTime: 2024 11:37 AM  ED Room: 34/34    Chief Complaint:   Shoulder Pain (R shoulder-dx with impingement/rotator cuff tear, states her provider won't give her refills and she's out of meds (percoset, tramadol, muscle relaxers))    History of Present Illness      Tawana Limon is a 39 y.o. old female presents to the emergency department for right shoulder pain.  Patient states she has an impingement in her right shoulder with rotator cuff tear.  She states she does follow-up with orthopedics who is working on getting her cortisone injections into her shoulder.  Patient states she is waiting for insurance approval.  She states she ran out of medication for pain and her provider will not refill them for her which prompted her visit to the emergency department.  Patient states the pain worsened last night she was unable to sleep much.  She is denying chest pain, shortness of breath, fever/chills, or new trauma/injury.  Patient is alert and oriented x 3 and in no apparent distress at this exam.  She is nontoxic-appearing.  She has no other complaint or concern at this ED visit.    PCP: No primary care provider on file.  Ortho: Dr. Chris SMYTH   Pertinent positives and negatives are stated within HPI, all other systems reviewed and are negative.    Past Medical History:   Past Medical History:   Diagnosis Date    28 weeks gestation of pregnancy 3/12/2020    35 weeks gestation of pregnancy 2020    38 weeks gestation of pregnancy 2020    39 weeks gestation of pregnancy 2020    Abnormal Pap smear of cervix     Chronic kidney disease     Knee effusion     Mental disorder     Postpartum depression     Pyelonephritis of right kidney 3/20/2020     (spontaneous vaginal delivery) 2020    Urinary tract infection in mother during second trimester of pregnancy 3/20/2020     Surgical History:   Past Surgical  myself.     Consultations:  None  Discussion with Other Profesionals : None    COUNSELING:   I have spoken with the patient/caregiver and discussed today’s results, in addition to providing specific details for the plan of care and counseling regarding the diagnosis and prognosis and are agreeable with the plan. All results reviewed with pt and all questions answered.  I discussed the differential, results and discharge plan with the patient and/or family/friend/caregiver if present.  I emphasized the importance of follow-up with the physician I referred them to in the timeframe recommended.  I explained reasons for the patient to return to the Emergency Department. Additional verbal discharge instructions were also given and discussed with the patient to supplement those generated by the EMR. We also discussed medications that were prescribed (if any) including common side effects and interactions. The patient was advised to abstain from driving, operating heavy machinery or making significant decisions while taking medications such as opiates and muscle relaxers that may impair this. All questions were addressed.  They understand return precautions and discharge instructions. The patient and/or family/friend/caregiver expressed understanding. Vitals were stable and they were in no distress at discharge. Findings were discussed with the patient and reasons to immediately return to the ED were articulated to them.     I used an evidence-based tool along with my training and experience to weigh the risk of discharge against the risks of further testing, imaging, or hospitalization. At this time, I estimate the risks of additional testing, imaging, or hospitalization to be equal to or greater than the risk of discharge.  I discussed my risk assessment with the patient and the patient consents to the risk of discharge as well as the risk of uncertainty in estimating outcomes. At this time, the risk of acute

## 2024-11-30 NOTE — DISCHARGE INSTR - COC
SpO2 100%   BMI 37.66 kg/m²     Last documented pain score (0-10 scale): Pain Level: 8  Last Weight:   Wt Readings from Last 1 Encounters:   24 115.7 kg (255 lb)     Mental Status:  {IP PT MENTAL STATUS:}    IV Access:  { GERARDO IV ACCESS:806094319}    Nursing Mobility/ADLs:  Walking   {CHP DME ADLs:132525763}  Transfer  {CHP DME ADLs:917461080}  Bathing  {CHP DME ADLs:424064773}  Dressing  {CHP DME ADLs:196700156}  Toileting  {CHP DME ADLs:533510193}  Feeding  {CHP DME ADLs:758200031}  Med Admin  {CHP DME ADLs:611347727}  Med Delivery   { GERARDO MED Delivery:268131652}    Wound Care Documentation and Therapy:        Elimination:  Continence:   Bowel: {YES / NO:}  Bladder: {YES / NO:}  Urinary Catheter: {Urinary Catheter:345621364}   Colostomy/Ileostomy/Ileal Conduit: {YES / NO:}       Date of Last BM: ***  No intake or output data in the 24 hours ending 24 1234  No intake/output data recorded.    Safety Concerns:     { GERARDO Safety Concerns:653376172}    Impairments/Disabilities:      { GERARDO Impairments/Disabilities:142726050}    Nutrition Therapy:  Current Nutrition Therapy:   { GERARDO Diet List:201307156}    Routes of Feeding: {CHP DME Other Feedings:967926877}  Liquids: {Slp liquid thickness:55917}  Daily Fluid Restriction: {CHP DME Yes amt example:276389629}  Last Modified Barium Swallow with Video (Video Swallowing Test): {Done Not Done Date:}    Treatments at the Time of Hospital Discharge:   Respiratory Treatments: ***  Oxygen Therapy:  {Therapy; copd oxygen:35890}  Ventilator:    { CC Vent List:129026742}    Rehab Therapies: {THERAPEUTIC INTERVENTION:0431343333}  Weight Bearing Status/Restrictions: {[facility] Weight Bearin}  Other Medical Equipment (for information only, NOT a DME order):  {EQUIPMENT:631127004}  Other Treatments: ***    Patient's personal belongings (please select all that are sent with patient):  {LIZZIE DUNBAR Belongings:274820316}    RHIANNA

## 2025-01-25 ENCOUNTER — HOSPITAL ENCOUNTER (EMERGENCY)
Age: 40
Discharge: HOME | End: 2025-01-25
Attending: FAMILY MEDICINE
Payer: COMMERCIAL

## 2025-01-25 VITALS
DIASTOLIC BLOOD PRESSURE: 82 MMHG | HEART RATE: 99 BPM | TEMPERATURE: 97.8 F | BODY MASS INDEX: 34.07 KG/M2 | WEIGHT: 230 LBS | HEIGHT: 69 IN | OXYGEN SATURATION: 99 % | RESPIRATION RATE: 16 BRPM | SYSTOLIC BLOOD PRESSURE: 128 MMHG

## 2025-01-25 DIAGNOSIS — J40 BRONCHITIS: ICD-10-CM

## 2025-01-25 DIAGNOSIS — J01.90 ACUTE NON-RECURRENT SINUSITIS, UNSPECIFIED LOCATION: Primary | ICD-10-CM

## 2025-01-25 PROCEDURE — 99283 EMERGENCY DEPT VISIT LOW MDM: CPT

## 2025-01-25 RX ORDER — DEXTROMETHORPHAN HYDROBROMIDE AND PROMETHAZINE HYDROCHLORIDE 15; 6.25 MG/5ML; MG/5ML
5 SYRUP ORAL 4 TIMES DAILY PRN
Qty: 118 ML | Refills: 0 | Status: SHIPPED | OUTPATIENT
Start: 2025-01-25 | End: 2025-02-01

## 2025-01-25 RX ORDER — ECHINACEA PURPUREA EXTRACT 125 MG
1 TABLET ORAL PRN
Qty: 88 ML | Refills: 0 | Status: SHIPPED | OUTPATIENT
Start: 2025-01-25

## 2025-01-25 RX ORDER — AZITHROMYCIN 250 MG/1
TABLET, FILM COATED ORAL
Qty: 1 PACKET | Refills: 0 | Status: SHIPPED | OUTPATIENT
Start: 2025-01-25 | End: 2025-01-29

## 2025-01-25 ASSESSMENT — PAIN DESCRIPTION - DESCRIPTORS: DESCRIPTORS: SORE

## 2025-01-25 ASSESSMENT — PAIN - FUNCTIONAL ASSESSMENT: PAIN_FUNCTIONAL_ASSESSMENT: 0-10

## 2025-01-25 ASSESSMENT — PAIN DESCRIPTION - LOCATION: LOCATION: HEAD;CHEST

## 2025-01-25 ASSESSMENT — PAIN DESCRIPTION - FREQUENCY: FREQUENCY: INTERMITTENT

## 2025-01-25 ASSESSMENT — PAIN DESCRIPTION - DIRECTION: RADIATING_TOWARDS: HURTS TO COUGH

## 2025-01-25 ASSESSMENT — PAIN DESCRIPTION - PAIN TYPE: TYPE: ACUTE PAIN

## 2025-01-25 NOTE — ED PROVIDER NOTES
HPI:  25,   Time: 12:09 PM DEEP Limon is a 39 y.o. female presenting to the ED for a 2-month history of for the most part has been a dry cough, now is productive of green sputum, the cough is worse at night.  She complains of facial pressure and a green nasal discharge that is pretty consistent throughout the day.  She complains of postnasal drainage.  She does smoke cigarettes starting about 15 years ago.  She denies fever or chills or bodyaches.  She denies nausea vomiting diarrhea and she has no sick contacts at home, she does work in the public sector in a restaurant.        ROS:   Pertinent positives and negatives are stated within HPI, all other systems reviewed and are negative.  --------------------------------------------- PAST HISTORY ---------------------------------------------  Past Medical History:  has a past medical history of 28 weeks gestation of pregnancy, 35 weeks gestation of pregnancy, 38 weeks gestation of pregnancy, 39 weeks gestation of pregnancy, Abnormal Pap smear of cervix, Chronic kidney disease, Knee effusion, Mental disorder, Postpartum depression, Pyelonephritis of right kidney,  (spontaneous vaginal delivery), and Urinary tract infection in mother during second trimester of pregnancy.    Past Surgical History:  has a past surgical history that includes Dilation and curettage of uterus (N/A, 2019).    Social History:  reports that she has been smoking cigarettes. She has a 7.5 pack-year smoking history. She uses smokeless tobacco. She reports that she does not currently use alcohol. She reports that she does not use drugs.    Family History: family history includes Allergy (Severe) in her nephew; Arthritis in her father and maternal grandfather; Breast Cancer in her mother; Cancer in her mother and paternal grandfather; Colon Cancer in her maternal grandfather; Diabetes in her father, maternal grandfather, and paternal grandfather; Heart Attack in her father  discussed today’s results, in addition to providing specific details for the plan of care and counseling regarding the diagnosis and prognosis.  Questions are answered at this time and they are agreeable with the plan.      --------------------------------- IMPRESSION AND DISPOSITION ---------------------------------    IMPRESSION  1. Acute non-recurrent sinusitis, unspecified location    2. Bronchitis        DISPOSITION  Disposition: Discharge to home  Patient condition is stable                 Lucien Graves MD  01/25/25 1210

## 2025-06-26 ENCOUNTER — APPOINTMENT (OUTPATIENT)
Dept: CT IMAGING | Age: 40
DRG: 720 | End: 2025-06-26
Payer: COMMERCIAL

## 2025-06-26 ENCOUNTER — HOSPITAL ENCOUNTER (INPATIENT)
Age: 40
LOS: 3 days | Discharge: HOME | DRG: 720 | End: 2025-06-29
Attending: EMERGENCY MEDICINE | Admitting: FAMILY MEDICINE
Payer: COMMERCIAL

## 2025-06-26 DIAGNOSIS — N39.0 URINARY TRACT INFECTION WITHOUT HEMATURIA, SITE UNSPECIFIED: ICD-10-CM

## 2025-06-26 DIAGNOSIS — N20.1 LEFT URETERAL STONE: ICD-10-CM

## 2025-06-26 DIAGNOSIS — N20.0 KIDNEY STONE: ICD-10-CM

## 2025-06-26 DIAGNOSIS — R10.9 FLANK PAIN: Primary | ICD-10-CM

## 2025-06-26 DIAGNOSIS — R50.9 FEVER, UNSPECIFIED FEVER CAUSE: ICD-10-CM

## 2025-06-26 PROBLEM — N12 PYELONEPHRITIS: Status: ACTIVE | Noted: 2025-06-26

## 2025-06-26 LAB
ALBUMIN SERPL-MCNC: 3.7 G/DL (ref 3.5–5.2)
ALP SERPL-CCNC: 73 U/L (ref 35–104)
ALT SERPL-CCNC: 33 U/L (ref 0–35)
ANION GAP SERPL CALCULATED.3IONS-SCNC: 11 MMOL/L (ref 7–16)
AST SERPL-CCNC: 42 U/L (ref 0–35)
BACTERIA URNS QL MICRO: ABNORMAL
BASOPHILS # BLD: 0.05 K/UL (ref 0–0.2)
BASOPHILS NFR BLD: 0 % (ref 0–2)
BILIRUB SERPL-MCNC: 0.3 MG/DL (ref 0–1.2)
BILIRUB UR QL STRIP: NEGATIVE
BUN SERPL-MCNC: 11 MG/DL (ref 6–20)
CALCIUM SERPL-MCNC: 9 MG/DL (ref 8.6–10)
CHLORIDE SERPL-SCNC: 107 MMOL/L (ref 98–107)
CLARITY UR: CLEAR
CO2 SERPL-SCNC: 23 MMOL/L (ref 22–29)
COLOR UR: YELLOW
CREAT SERPL-MCNC: 0.8 MG/DL (ref 0.5–1)
EOSINOPHIL # BLD: 0.08 K/UL (ref 0.05–0.5)
EOSINOPHILS RELATIVE PERCENT: 1 % (ref 0–6)
EPI CELLS #/AREA URNS HPF: ABNORMAL /HPF
ERYTHROCYTE [DISTWIDTH] IN BLOOD BY AUTOMATED COUNT: 15.4 % (ref 11.5–15)
GFR, ESTIMATED: >90 ML/MIN/1.73M2
GLUCOSE SERPL-MCNC: 174 MG/DL (ref 74–99)
GLUCOSE UR STRIP-MCNC: NEGATIVE MG/DL
HCG, URINE, POC: NEGATIVE
HCT VFR BLD AUTO: 40.3 % (ref 34–48)
HGB BLD-MCNC: 12.7 G/DL (ref 11.5–15.5)
HGB UR QL STRIP.AUTO: ABNORMAL
IMM GRANULOCYTES # BLD AUTO: 0.06 K/UL (ref 0–0.58)
IMM GRANULOCYTES NFR BLD: 0 % (ref 0–5)
KETONES UR STRIP-MCNC: ABNORMAL MG/DL
LACTATE BLDV-SCNC: 1.8 MMOL/L (ref 0.5–2.2)
LEUKOCYTE ESTERASE UR QL STRIP: ABNORMAL
LYMPHOCYTES NFR BLD: 0.92 K/UL (ref 1.5–4)
LYMPHOCYTES RELATIVE PERCENT: 7 % (ref 20–42)
Lab: NORMAL
MCH RBC QN AUTO: 26 PG (ref 26–35)
MCHC RBC AUTO-ENTMCNC: 31.5 G/DL (ref 32–34.5)
MCV RBC AUTO: 82.6 FL (ref 80–99.9)
MONOCYTES NFR BLD: 0.73 K/UL (ref 0.1–0.95)
MONOCYTES NFR BLD: 5 % (ref 2–12)
NEGATIVE QC PASS/FAIL: NORMAL
NEUTROPHILS NFR BLD: 87 % (ref 43–80)
NEUTS SEG NFR BLD: 12.35 K/UL (ref 1.8–7.3)
NITRITE UR QL STRIP: POSITIVE
PH UR STRIP: 6 [PH] (ref 5–8)
PLATELET # BLD AUTO: 249 K/UL (ref 130–450)
PMV BLD AUTO: 11.3 FL (ref 7–12)
POSITIVE QC PASS/FAIL: NORMAL
POTASSIUM SERPL-SCNC: 3.8 MMOL/L (ref 3.5–5.1)
PROT SERPL-MCNC: 6.1 G/DL (ref 6.4–8.3)
PROT UR STRIP-MCNC: 30 MG/DL
RBC # BLD AUTO: 4.88 M/UL (ref 3.5–5.5)
RBC #/AREA URNS HPF: ABNORMAL /HPF
SODIUM SERPL-SCNC: 141 MMOL/L (ref 136–145)
SP GR UR STRIP: 1.02 (ref 1–1.03)
UROBILINOGEN UR STRIP-ACNC: 0.2 EU/DL (ref 0–1)
WBC #/AREA URNS HPF: ABNORMAL /HPF
WBC OTHER # BLD: 14.2 K/UL (ref 4.5–11.5)

## 2025-06-26 PROCEDURE — 6370000000 HC RX 637 (ALT 250 FOR IP): Performed by: NURSE PRACTITIONER

## 2025-06-26 PROCEDURE — 87088 URINE BACTERIA CULTURE: CPT

## 2025-06-26 PROCEDURE — 99285 EMERGENCY DEPT VISIT HI MDM: CPT

## 2025-06-26 PROCEDURE — 74176 CT ABD & PELVIS W/O CONTRAST: CPT

## 2025-06-26 PROCEDURE — 96375 TX/PRO/DX INJ NEW DRUG ADDON: CPT

## 2025-06-26 PROCEDURE — 81001 URINALYSIS AUTO W/SCOPE: CPT

## 2025-06-26 PROCEDURE — 96372 THER/PROPH/DIAG INJ SC/IM: CPT

## 2025-06-26 PROCEDURE — 1200000000 HC SEMI PRIVATE

## 2025-06-26 PROCEDURE — 87040 BLOOD CULTURE FOR BACTERIA: CPT

## 2025-06-26 PROCEDURE — 85025 COMPLETE CBC W/AUTO DIFF WBC: CPT

## 2025-06-26 PROCEDURE — 99222 1ST HOSP IP/OBS MODERATE 55: CPT | Performed by: FAMILY MEDICINE

## 2025-06-26 PROCEDURE — 2500000003 HC RX 250 WO HCPCS: Performed by: NURSE PRACTITIONER

## 2025-06-26 PROCEDURE — 80053 COMPREHEN METABOLIC PANEL: CPT

## 2025-06-26 PROCEDURE — 96374 THER/PROPH/DIAG INJ IV PUSH: CPT

## 2025-06-26 PROCEDURE — 2580000003 HC RX 258: Performed by: FAMILY MEDICINE

## 2025-06-26 PROCEDURE — 83605 ASSAY OF LACTIC ACID: CPT

## 2025-06-26 PROCEDURE — 87086 URINE CULTURE/COLONY COUNT: CPT

## 2025-06-26 PROCEDURE — 6360000002 HC RX W HCPCS: Performed by: NURSE PRACTITIONER

## 2025-06-26 PROCEDURE — 2580000003 HC RX 258: Performed by: NURSE PRACTITIONER

## 2025-06-26 PROCEDURE — 87150 DNA/RNA AMPLIFIED PROBE: CPT

## 2025-06-26 RX ORDER — ONDANSETRON 2 MG/ML
4 INJECTION INTRAMUSCULAR; INTRAVENOUS EVERY 6 HOURS PRN
Status: DISCONTINUED | OUTPATIENT
Start: 2025-06-26 | End: 2025-06-29 | Stop reason: HOSPADM

## 2025-06-26 RX ORDER — ONDANSETRON 2 MG/ML
4 INJECTION INTRAMUSCULAR; INTRAVENOUS ONCE
Status: COMPLETED | OUTPATIENT
Start: 2025-06-26 | End: 2025-06-26

## 2025-06-26 RX ORDER — ONDANSETRON 4 MG/1
4 TABLET, ORALLY DISINTEGRATING ORAL EVERY 8 HOURS PRN
Status: DISCONTINUED | OUTPATIENT
Start: 2025-06-26 | End: 2025-06-29 | Stop reason: HOSPADM

## 2025-06-26 RX ORDER — ACETAMINOPHEN 325 MG/1
650 TABLET ORAL EVERY 6 HOURS PRN
Status: DISCONTINUED | OUTPATIENT
Start: 2025-06-26 | End: 2025-06-29 | Stop reason: HOSPADM

## 2025-06-26 RX ORDER — MAGNESIUM SULFATE IN WATER 40 MG/ML
2000 INJECTION, SOLUTION INTRAVENOUS PRN
Status: DISCONTINUED | OUTPATIENT
Start: 2025-06-26 | End: 2025-06-29 | Stop reason: HOSPADM

## 2025-06-26 RX ORDER — POLYETHYLENE GLYCOL 3350 17 G/17G
17 POWDER, FOR SOLUTION ORAL DAILY PRN
Status: DISCONTINUED | OUTPATIENT
Start: 2025-06-26 | End: 2025-06-29 | Stop reason: HOSPADM

## 2025-06-26 RX ORDER — SODIUM CHLORIDE 0.9 % (FLUSH) 0.9 %
5-40 SYRINGE (ML) INJECTION PRN
Status: DISCONTINUED | OUTPATIENT
Start: 2025-06-26 | End: 2025-06-29 | Stop reason: HOSPADM

## 2025-06-26 RX ORDER — TRAMADOL HYDROCHLORIDE 50 MG/1
50 TABLET ORAL EVERY 6 HOURS PRN
Status: DISCONTINUED | OUTPATIENT
Start: 2025-06-26 | End: 2025-06-29 | Stop reason: HOSPADM

## 2025-06-26 RX ORDER — 0.9 % SODIUM CHLORIDE 0.9 %
1000 INTRAVENOUS SOLUTION INTRAVENOUS ONCE
Status: COMPLETED | OUTPATIENT
Start: 2025-06-26 | End: 2025-06-27

## 2025-06-26 RX ORDER — ACETAMINOPHEN 650 MG/1
650 SUPPOSITORY RECTAL EVERY 6 HOURS PRN
Status: DISCONTINUED | OUTPATIENT
Start: 2025-06-26 | End: 2025-06-29 | Stop reason: HOSPADM

## 2025-06-26 RX ORDER — 0.9 % SODIUM CHLORIDE 0.9 %
1000 INTRAVENOUS SOLUTION INTRAVENOUS ONCE
Status: COMPLETED | OUTPATIENT
Start: 2025-06-26 | End: 2025-06-26

## 2025-06-26 RX ORDER — POTASSIUM CHLORIDE 7.45 MG/ML
10 INJECTION INTRAVENOUS PRN
Status: DISCONTINUED | OUTPATIENT
Start: 2025-06-26 | End: 2025-06-29 | Stop reason: HOSPADM

## 2025-06-26 RX ORDER — SODIUM CHLORIDE 9 MG/ML
INJECTION, SOLUTION INTRAVENOUS PRN
Status: DISCONTINUED | OUTPATIENT
Start: 2025-06-26 | End: 2025-06-29 | Stop reason: HOSPADM

## 2025-06-26 RX ORDER — KETOROLAC TROMETHAMINE 30 MG/ML
15 INJECTION, SOLUTION INTRAMUSCULAR; INTRAVENOUS ONCE
Status: COMPLETED | OUTPATIENT
Start: 2025-06-26 | End: 2025-06-26

## 2025-06-26 RX ORDER — ENOXAPARIN SODIUM 100 MG/ML
40 INJECTION SUBCUTANEOUS DAILY
Status: DISCONTINUED | OUTPATIENT
Start: 2025-06-27 | End: 2025-06-29 | Stop reason: HOSPADM

## 2025-06-26 RX ORDER — FENTANYL CITRATE 50 UG/ML
50 INJECTION, SOLUTION INTRAMUSCULAR; INTRAVENOUS ONCE
Status: COMPLETED | OUTPATIENT
Start: 2025-06-26 | End: 2025-06-26

## 2025-06-26 RX ORDER — SODIUM CHLORIDE 9 MG/ML
INJECTION, SOLUTION INTRAVENOUS CONTINUOUS
Status: ACTIVE | OUTPATIENT
Start: 2025-06-26 | End: 2025-06-27

## 2025-06-26 RX ORDER — CEFDINIR 300 MG/1
300 CAPSULE ORAL 2 TIMES DAILY
Qty: 20 CAPSULE | Refills: 0 | Status: SHIPPED | OUTPATIENT
Start: 2025-06-26 | End: 2025-06-29 | Stop reason: HOSPADM

## 2025-06-26 RX ORDER — ACETAMINOPHEN 325 MG/1
650 TABLET ORAL ONCE
Status: COMPLETED | OUTPATIENT
Start: 2025-06-26 | End: 2025-06-26

## 2025-06-26 RX ORDER — SODIUM CHLORIDE 0.9 % (FLUSH) 0.9 %
5-40 SYRINGE (ML) INJECTION EVERY 12 HOURS SCHEDULED
Status: DISCONTINUED | OUTPATIENT
Start: 2025-06-26 | End: 2025-06-29 | Stop reason: HOSPADM

## 2025-06-26 RX ORDER — POTASSIUM CHLORIDE 1500 MG/1
40 TABLET, EXTENDED RELEASE ORAL PRN
Status: DISCONTINUED | OUTPATIENT
Start: 2025-06-26 | End: 2025-06-29 | Stop reason: HOSPADM

## 2025-06-26 RX ADMIN — ACETAMINOPHEN 650 MG: 325 TABLET ORAL at 22:54

## 2025-06-26 RX ADMIN — SODIUM CHLORIDE 1000 ML: 9 INJECTION, SOLUTION INTRAVENOUS at 23:33

## 2025-06-26 RX ADMIN — FENTANYL CITRATE 50 MCG: 50 INJECTION INTRAMUSCULAR; INTRAVENOUS at 20:43

## 2025-06-26 RX ADMIN — ONDANSETRON 4 MG: 2 INJECTION, SOLUTION INTRAMUSCULAR; INTRAVENOUS at 18:56

## 2025-06-26 RX ADMIN — HYDROMORPHONE HYDROCHLORIDE 0.5 MG: 1 INJECTION, SOLUTION INTRAMUSCULAR; INTRAVENOUS; SUBCUTANEOUS at 22:23

## 2025-06-26 RX ADMIN — SODIUM CHLORIDE 1000 ML: 0.9 INJECTION, SOLUTION INTRAVENOUS at 18:55

## 2025-06-26 RX ADMIN — WATER 2000 MG: 1 INJECTION INTRAMUSCULAR; INTRAVENOUS; SUBCUTANEOUS at 20:44

## 2025-06-26 RX ADMIN — SODIUM CHLORIDE: 9 INJECTION, SOLUTION INTRAVENOUS at 23:33

## 2025-06-26 RX ADMIN — KETOROLAC TROMETHAMINE 15 MG: 30 INJECTION, SOLUTION INTRAMUSCULAR at 18:56

## 2025-06-26 ASSESSMENT — PAIN - FUNCTIONAL ASSESSMENT: PAIN_FUNCTIONAL_ASSESSMENT: NONE - DENIES PAIN

## 2025-06-26 NOTE — ED PROVIDER NOTES
Shared HARMEET-ED Attending Visit.  CC: No         [facility] EMERGENCY DEPARTMENT  ED  Encounter Note  Admit Date/RoomTime: 2025  5:38 PM  ED Room: [place] [address]  NAME: Tawana Limon  : 1985  MRN: [id number]  PCP: No primary care provider on file.    CHIEF COMPLAINT     Flank Pain (/Started one week ago, came back today worse )    HISTORY OF PRESENT ILLNESS        Tawana Limon is a 40 y.o. female who presents to the ED via private vehicle with complaint of left flank pain worsening yesterday.  States she has had intermittent renal calculi since 2018.  Generally resolved with lithotripsy.  She does report that today she had a temperature at its max of 103.  She has been taking over-the-counter Tylenol and ibuprofen for pain/fever.  She reports the pain is severe.  She does have some burning with urination.  She currently denies any chance of pregnancy.  She does endorse some nausea.  Has not vomited.  REVIEW OF SYSTEMS     Pertinent positives and negatives are stated within HPI, all other systems reviewed and are negative.    Past Medical History:  has a past medical history of 28 weeks gestation of pregnancy, 35 weeks gestation of pregnancy, 38 weeks gestation of pregnancy, 39 weeks gestation of pregnancy, Abnormal Pap smear of cervix, Chronic kidney disease, Knee effusion, Mental disorder, Postpartum depression, Pyelonephritis of right kidney,  (spontaneous vaginal delivery), and Urinary tract infection in mother during second trimester of pregnancy.  Surgical History:  has a past surgical history that includes Dilation and curettage of uterus (N/A, 2019).  Social History:  reports that she has been smoking cigarettes. She has a 7.5 pack-year smoking history. She uses smokeless tobacco. She reports that she does not currently use alcohol. She reports that she does not use drugs.  Family History: family history includes Allergy (Severe) in her nephew; Arthritis in her father  of UTI history of flank pain presenting here for last day.  Patient reporting nausea vomiting as well as fever patient reports taking Tylenol at home for the fever.  Patient reporting no chest pain or difficulty breathing she reports no hematuria.  Patient here is awake alert oriented.  Patient tachycardic lungs are clear abdomen soft but tender she does have CVA tenderness.  Patient able to move all extremities.  Patient has no edema.  Patient does have a history of tobacco use.  Patient differential clues kidney stone as well as pyelonephritis as well as UTI as well as diverticulitis.  Patient seen by OB/GYN and 7/6/2019 for dilatation and curettage suction patient was seen by Emergency Department on 1/25/2025 for sinusitis bronchitis  Patient lab work obtained white count was 14.2 hemoglobin is 12.7 blood cultures were obtained platelet count was 249 sodium is 141 potassium 3.8 BUN was 11 creatinine 0.8 alk phos 73 urine showed 21-50 WBCs nitrite and leukocyte esterase positive pregnancy test negative lactic was 1.8 CT abdomen pelvis showed 2 adjacent 2 mm obstructing calculi proximal left ureter mild left hydronephrosis.  Patient was given IV normal saline here she was given Tylenol.  Patient was also given Dilaudid several times for pain as well as fentanyl.  Patient given IV Rocephin due to her urinary infection.  Patient rechecked several times but still having pain.  Patient with her fever as well as kidney stone plan will be to admit we did speak to urology regarding patient.  Patient to be NPO.  Patient will be admitted we did speak to [facility] internal medicine they will accept admission.     Please note that the withdrawal or failure to initiate urgent interventions for this patient would likely result in a life threatening deterioration or permanent disability.      Accordingly this patient received 30 minutes of critical care time, excluding separately billable procedures.    Corby Acharya,

## 2025-06-27 ENCOUNTER — ANESTHESIA (OUTPATIENT)
Dept: OPERATING ROOM | Age: 40
End: 2025-06-27
Payer: COMMERCIAL

## 2025-06-27 ENCOUNTER — ANESTHESIA EVENT (OUTPATIENT)
Dept: OPERATING ROOM | Age: 40
End: 2025-06-27
Payer: COMMERCIAL

## 2025-06-27 ENCOUNTER — APPOINTMENT (OUTPATIENT)
Dept: GENERAL RADIOLOGY | Age: 40
DRG: 720 | End: 2025-06-27
Payer: COMMERCIAL

## 2025-06-27 LAB
ANION GAP SERPL CALCULATED.3IONS-SCNC: 9 MMOL/L (ref 7–16)
BASOPHILS # BLD: 0.03 K/UL (ref 0–0.2)
BASOPHILS NFR BLD: 0 % (ref 0–2)
BUN SERPL-MCNC: 10 MG/DL (ref 6–20)
CALCIUM SERPL-MCNC: 7.7 MG/DL (ref 8.6–10)
CHLORIDE SERPL-SCNC: 111 MMOL/L (ref 98–107)
CO2 SERPL-SCNC: 18 MMOL/L (ref 22–29)
CREAT SERPL-MCNC: 0.7 MG/DL (ref 0.5–1)
EOSINOPHIL # BLD: 0.11 K/UL (ref 0.05–0.5)
EOSINOPHILS RELATIVE PERCENT: 1 % (ref 0–6)
ERYTHROCYTE [DISTWIDTH] IN BLOOD BY AUTOMATED COUNT: 15.8 % (ref 11.5–15)
GFR, ESTIMATED: >90 ML/MIN/1.73M2
GLUCOSE SERPL-MCNC: 106 MG/DL (ref 74–99)
HCT VFR BLD AUTO: 35.5 % (ref 34–48)
HGB BLD-MCNC: 11.2 G/DL (ref 11.5–15.5)
IMM GRANULOCYTES # BLD AUTO: 0.09 K/UL (ref 0–0.58)
IMM GRANULOCYTES NFR BLD: 1 % (ref 0–5)
LYMPHOCYTES NFR BLD: 0.96 K/UL (ref 1.5–4)
LYMPHOCYTES RELATIVE PERCENT: 7 % (ref 20–42)
MCH RBC QN AUTO: 26.2 PG (ref 26–35)
MCHC RBC AUTO-ENTMCNC: 31.5 G/DL (ref 32–34.5)
MCV RBC AUTO: 83.1 FL (ref 80–99.9)
MONOCYTES NFR BLD: 0.92 K/UL (ref 0.1–0.95)
MONOCYTES NFR BLD: 7 % (ref 2–12)
NEUTROPHILS NFR BLD: 85 % (ref 43–80)
NEUTS SEG NFR BLD: 11.82 K/UL (ref 1.8–7.3)
PLATELET # BLD AUTO: 196 K/UL (ref 130–450)
PMV BLD AUTO: 11.2 FL (ref 7–12)
POTASSIUM SERPL-SCNC: 3.9 MMOL/L (ref 3.5–5.1)
RBC # BLD AUTO: 4.27 M/UL (ref 3.5–5.5)
SODIUM SERPL-SCNC: 138 MMOL/L (ref 136–145)
WBC OTHER # BLD: 13.9 K/UL (ref 4.5–11.5)

## 2025-06-27 PROCEDURE — 6360000002 HC RX W HCPCS

## 2025-06-27 PROCEDURE — 7100000001 HC PACU RECOVERY - ADDTL 15 MIN: Performed by: UROLOGY

## 2025-06-27 PROCEDURE — 85025 COMPLETE CBC W/AUTO DIFF WBC: CPT

## 2025-06-27 PROCEDURE — 2580000003 HC RX 258: Performed by: FAMILY MEDICINE

## 2025-06-27 PROCEDURE — C1769 GUIDE WIRE: HCPCS | Performed by: UROLOGY

## 2025-06-27 PROCEDURE — 3700000000 HC ANESTHESIA ATTENDED CARE: Performed by: UROLOGY

## 2025-06-27 PROCEDURE — 6370000000 HC RX 637 (ALT 250 FOR IP): Performed by: FAMILY MEDICINE

## 2025-06-27 PROCEDURE — 99233 SBSQ HOSP IP/OBS HIGH 50: CPT | Performed by: INTERNAL MEDICINE

## 2025-06-27 PROCEDURE — 3600000014 HC SURGERY LEVEL 4 ADDTL 15MIN: Performed by: UROLOGY

## 2025-06-27 PROCEDURE — 6360000002 HC RX W HCPCS: Performed by: NURSE PRACTITIONER

## 2025-06-27 PROCEDURE — 2500000003 HC RX 250 WO HCPCS

## 2025-06-27 PROCEDURE — C2617 STENT, NON-COR, TEM W/O DEL: HCPCS | Performed by: UROLOGY

## 2025-06-27 PROCEDURE — 36415 COLL VENOUS BLD VENIPUNCTURE: CPT

## 2025-06-27 PROCEDURE — 0T778DZ DILATION OF LEFT URETER WITH INTRALUMINAL DEVICE, VIA NATURAL OR ARTIFICIAL OPENING ENDOSCOPIC: ICD-10-PCS | Performed by: UROLOGY

## 2025-06-27 PROCEDURE — 3700000001 HC ADD 15 MINUTES (ANESTHESIA): Performed by: UROLOGY

## 2025-06-27 PROCEDURE — 6360000002 HC RX W HCPCS: Performed by: FAMILY MEDICINE

## 2025-06-27 PROCEDURE — 6370000000 HC RX 637 (ALT 250 FOR IP): Performed by: UROLOGY

## 2025-06-27 PROCEDURE — BT1F1ZZ FLUOROSCOPY OF LEFT KIDNEY, URETER AND BLADDER USING LOW OSMOLAR CONTRAST: ICD-10-PCS | Performed by: UROLOGY

## 2025-06-27 PROCEDURE — 2500000003 HC RX 250 WO HCPCS: Performed by: FAMILY MEDICINE

## 2025-06-27 PROCEDURE — 7100000000 HC PACU RECOVERY - FIRST 15 MIN: Performed by: UROLOGY

## 2025-06-27 PROCEDURE — 3600000004 HC SURGERY LEVEL 4 BASE: Performed by: UROLOGY

## 2025-06-27 PROCEDURE — 74420 UROGRAPHY RTRGR +-KUB: CPT

## 2025-06-27 PROCEDURE — 2709999900 HC NON-CHARGEABLE SUPPLY: Performed by: UROLOGY

## 2025-06-27 PROCEDURE — 87086 URINE CULTURE/COLONY COUNT: CPT

## 2025-06-27 PROCEDURE — C1758 CATHETER, URETERAL: HCPCS | Performed by: UROLOGY

## 2025-06-27 PROCEDURE — 80048 BASIC METABOLIC PNL TOTAL CA: CPT

## 2025-06-27 PROCEDURE — 2580000003 HC RX 258: Performed by: UROLOGY

## 2025-06-27 PROCEDURE — 1200000000 HC SEMI PRIVATE

## 2025-06-27 DEVICE — URETERAL STENT
Type: IMPLANTABLE DEVICE | Site: URETER | Status: FUNCTIONAL
Brand: PERCUFLEX™

## 2025-06-27 RX ORDER — PROPOFOL 10 MG/ML
INJECTION, EMULSION INTRAVENOUS
Status: DISCONTINUED | OUTPATIENT
Start: 2025-06-27 | End: 2025-06-27 | Stop reason: SDUPTHER

## 2025-06-27 RX ORDER — IBUPROFEN 600 MG/1
600 TABLET, FILM COATED ORAL EVERY 6 HOURS PRN
Status: DISCONTINUED | OUTPATIENT
Start: 2025-06-27 | End: 2025-06-29 | Stop reason: HOSPADM

## 2025-06-27 RX ORDER — SODIUM CHLORIDE 9 MG/ML
INJECTION, SOLUTION INTRAVENOUS PRN
Status: DISCONTINUED | OUTPATIENT
Start: 2025-06-27 | End: 2025-06-29 | Stop reason: HOSPADM

## 2025-06-27 RX ORDER — ONDANSETRON 2 MG/ML
INJECTION INTRAMUSCULAR; INTRAVENOUS
Status: DISCONTINUED | OUTPATIENT
Start: 2025-06-27 | End: 2025-06-27 | Stop reason: SDUPTHER

## 2025-06-27 RX ORDER — HYDROMORPHONE HYDROCHLORIDE 1 MG/ML
0.25 INJECTION, SOLUTION INTRAMUSCULAR; INTRAVENOUS; SUBCUTANEOUS EVERY 5 MIN PRN
Status: DISCONTINUED | OUTPATIENT
Start: 2025-06-27 | End: 2025-06-29 | Stop reason: HOSPADM

## 2025-06-27 RX ORDER — PROCHLORPERAZINE EDISYLATE 5 MG/ML
5 INJECTION INTRAMUSCULAR; INTRAVENOUS
Status: DISCONTINUED | OUTPATIENT
Start: 2025-06-27 | End: 2025-06-29 | Stop reason: HOSPADM

## 2025-06-27 RX ORDER — NALOXONE HYDROCHLORIDE 0.4 MG/ML
INJECTION, SOLUTION INTRAMUSCULAR; INTRAVENOUS; SUBCUTANEOUS PRN
Status: DISCONTINUED | OUTPATIENT
Start: 2025-06-27 | End: 2025-06-29 | Stop reason: HOSPADM

## 2025-06-27 RX ORDER — HYDROMORPHONE HYDROCHLORIDE 1 MG/ML
0.5 INJECTION, SOLUTION INTRAMUSCULAR; INTRAVENOUS; SUBCUTANEOUS EVERY 5 MIN PRN
Status: DISCONTINUED | OUTPATIENT
Start: 2025-06-27 | End: 2025-06-29 | Stop reason: HOSPADM

## 2025-06-27 RX ORDER — DEXMEDETOMIDINE HYDROCHLORIDE 100 UG/ML
INJECTION, SOLUTION INTRAVENOUS
Status: DISCONTINUED | OUTPATIENT
Start: 2025-06-27 | End: 2025-06-27 | Stop reason: SDUPTHER

## 2025-06-27 RX ORDER — SODIUM CHLORIDE 0.9 % (FLUSH) 0.9 %
5-40 SYRINGE (ML) INJECTION EVERY 12 HOURS SCHEDULED
Status: DISCONTINUED | OUTPATIENT
Start: 2025-06-27 | End: 2025-06-29 | Stop reason: HOSPADM

## 2025-06-27 RX ORDER — MIDAZOLAM HYDROCHLORIDE 1 MG/ML
INJECTION, SOLUTION INTRAMUSCULAR; INTRAVENOUS
Status: DISCONTINUED | OUTPATIENT
Start: 2025-06-27 | End: 2025-06-27 | Stop reason: SDUPTHER

## 2025-06-27 RX ORDER — FENTANYL CITRATE 50 UG/ML
INJECTION, SOLUTION INTRAMUSCULAR; INTRAVENOUS
Status: DISCONTINUED | OUTPATIENT
Start: 2025-06-27 | End: 2025-06-27 | Stop reason: SDUPTHER

## 2025-06-27 RX ORDER — SODIUM CHLORIDE 0.9 % (FLUSH) 0.9 %
5-40 SYRINGE (ML) INJECTION PRN
Status: DISCONTINUED | OUTPATIENT
Start: 2025-06-27 | End: 2025-06-29 | Stop reason: HOSPADM

## 2025-06-27 RX ADMIN — DEXMEDETOMIDINE HCL 4 MCG: 100 INJECTION INTRAVENOUS at 15:49

## 2025-06-27 RX ADMIN — TRAMADOL HYDROCHLORIDE 50 MG: 50 TABLET, COATED ORAL at 17:54

## 2025-06-27 RX ADMIN — HYDROMORPHONE HYDROCHLORIDE 0.25 MG: 1 INJECTION, SOLUTION INTRAMUSCULAR; INTRAVENOUS; SUBCUTANEOUS at 06:34

## 2025-06-27 RX ADMIN — HYDROMORPHONE HYDROCHLORIDE 0.25 MG: 1 INJECTION, SOLUTION INTRAMUSCULAR; INTRAVENOUS; SUBCUTANEOUS at 11:47

## 2025-06-27 RX ADMIN — FENTANYL CITRATE 50 MCG: 50 INJECTION, SOLUTION INTRAMUSCULAR; INTRAVENOUS at 15:49

## 2025-06-27 RX ADMIN — MIDAZOLAM 2 MG: 1 INJECTION INTRAMUSCULAR; INTRAVENOUS at 15:40

## 2025-06-27 RX ADMIN — PROPOFOL 50 MG: 10 INJECTION, EMULSION INTRAVENOUS at 15:47

## 2025-06-27 RX ADMIN — ONDANSETRON HYDROCHLORIDE 4 MG: 2 SOLUTION INTRAMUSCULAR; INTRAVENOUS at 15:46

## 2025-06-27 RX ADMIN — WATER 1000 MG: 1 INJECTION INTRAMUSCULAR; INTRAVENOUS; SUBCUTANEOUS at 13:10

## 2025-06-27 RX ADMIN — Medication 20 MG: at 16:08

## 2025-06-27 RX ADMIN — Medication 20 MG: at 15:57

## 2025-06-27 RX ADMIN — PROPOFOL 125 MCG/KG/MIN: 10 INJECTION, EMULSION INTRAVENOUS at 15:48

## 2025-06-27 RX ADMIN — SODIUM CHLORIDE: 9 INJECTION, SOLUTION INTRAVENOUS at 09:39

## 2025-06-27 RX ADMIN — SODIUM CHLORIDE: 9 INJECTION, SOLUTION INTRAVENOUS at 17:47

## 2025-06-27 RX ADMIN — FENTANYL CITRATE 50 MCG: 50 INJECTION, SOLUTION INTRAMUSCULAR; INTRAVENOUS at 15:56

## 2025-06-27 RX ADMIN — TRAMADOL HYDROCHLORIDE 50 MG: 50 TABLET, COATED ORAL at 10:23

## 2025-06-27 RX ADMIN — ACETAMINOPHEN 650 MG: 325 TABLET ORAL at 21:39

## 2025-06-27 RX ADMIN — ACETAMINOPHEN 650 MG: 325 TABLET ORAL at 09:12

## 2025-06-27 RX ADMIN — HYDROMORPHONE HYDROCHLORIDE 0.5 MG: 1 INJECTION, SOLUTION INTRAMUSCULAR; INTRAVENOUS; SUBCUTANEOUS at 02:26

## 2025-06-27 RX ADMIN — SODIUM CHLORIDE, PRESERVATIVE FREE 10 ML: 5 INJECTION INTRAVENOUS at 02:27

## 2025-06-27 RX ADMIN — DEXMEDETOMIDINE HCL 8 MCG: 100 INJECTION INTRAVENOUS at 15:47

## 2025-06-27 RX ADMIN — TRAMADOL HYDROCHLORIDE 50 MG: 50 TABLET, COATED ORAL at 03:37

## 2025-06-27 RX ADMIN — ONDANSETRON 4 MG: 2 INJECTION, SOLUTION INTRAMUSCULAR; INTRAVENOUS at 06:44

## 2025-06-27 RX ADMIN — TRAMADOL HYDROCHLORIDE 50 MG: 50 TABLET, COATED ORAL at 23:51

## 2025-06-27 ASSESSMENT — PAIN DESCRIPTION - PAIN TYPE
TYPE: ACUTE PAIN

## 2025-06-27 ASSESSMENT — PAIN DESCRIPTION - LOCATION
LOCATION: FLANK
LOCATION: HEAD
LOCATION: FLANK
LOCATION: BACK
LOCATION: HEAD
LOCATION: OTHER (COMMENT)
LOCATION: HEAD
LOCATION: FLANK
LOCATION: FLANK

## 2025-06-27 ASSESSMENT — PAIN DESCRIPTION - ORIENTATION
ORIENTATION: LEFT
ORIENTATION: RIGHT
ORIENTATION: LEFT
ORIENTATION: RIGHT

## 2025-06-27 ASSESSMENT — PAIN SCALES - GENERAL
PAINLEVEL_OUTOF10: 7
PAINLEVEL_OUTOF10: 1
PAINLEVEL_OUTOF10: 7
PAINLEVEL_OUTOF10: 5
PAINLEVEL_OUTOF10: 7
PAINLEVEL_OUTOF10: 7
PAINLEVEL_OUTOF10: 4
PAINLEVEL_OUTOF10: 8
PAINLEVEL_OUTOF10: 7
PAINLEVEL_OUTOF10: 0
PAINLEVEL_OUTOF10: 7
PAINLEVEL_OUTOF10: 8
PAINLEVEL_OUTOF10: 8

## 2025-06-27 ASSESSMENT — PAIN DESCRIPTION - ONSET
ONSET: ON-GOING
ONSET: GRADUAL
ONSET: ON-GOING
ONSET: ON-GOING
ONSET: GRADUAL
ONSET: ON-GOING
ONSET: ON-GOING

## 2025-06-27 ASSESSMENT — PAIN DESCRIPTION - FREQUENCY
FREQUENCY: CONTINUOUS

## 2025-06-27 ASSESSMENT — PAIN - FUNCTIONAL ASSESSMENT
PAIN_FUNCTIONAL_ASSESSMENT: NONE - DENIES PAIN
PAIN_FUNCTIONAL_ASSESSMENT: PREVENTS OR INTERFERES SOME ACTIVE ACTIVITIES AND ADLS

## 2025-06-27 ASSESSMENT — PAIN DESCRIPTION - DESCRIPTORS
DESCRIPTORS: ACHING;DISCOMFORT;THROBBING
DESCRIPTORS: ACHING;DISCOMFORT;THROBBING
DESCRIPTORS: ACHING;THROBBING;DISCOMFORT
DESCRIPTORS: THROBBING;TIGHTNESS;DISCOMFORT
DESCRIPTORS: SHARP;STABBING;TENDER
DESCRIPTORS: ACHING;BURNING;THROBBING
DESCRIPTORS: THROBBING;ACHING;BURNING
DESCRIPTORS: SHARP;STABBING;TENDER

## 2025-06-27 ASSESSMENT — LIFESTYLE VARIABLES: SMOKING_STATUS: 1

## 2025-06-27 NOTE — ANESTHESIA PRE PROCEDURE
Department of Anesthesiology  Preprocedure Note       Name:  Tawana Limon   Age:  40 y.o.  :  1985                                          MRN:  [id number]         Date:  2025      Surgeon: DAVID    Procedure: LABOR OPTIONS (JODY)  Medications prior to admission:   Prior to Admission medications    Medication Sig Start Date End Date Taking? Authorizing Provider   cefdinir (OMNICEF) 300 MG capsule Take 1 capsule by mouth 2 times daily for 10 days 25 Yes Malissa Colindres APRN - CNP   acetaminophen (TYLENOL) 500 MG tablet Take 1 tablet by mouth 4 times daily as needed for Pain 10/6/23  Yes Malissa Colindres APRN - CNP   sodium chloride (OCEAN) 0.65 % nasal spray 1 spray by Nasal route as needed for Congestion  Patient not taking: Reported on 2025   Lucien Graves MD       Current medications:    Current Facility-Administered Medications   Medication Dose Route Frequency Provider Last Rate Last Admin    sodium chloride flush 0.9 % injection 5-40 mL  5-40 mL IntraVENous 2 times per day Christopher Shipley MD        sodium chloride flush 0.9 % injection 5-40 mL  5-40 mL IntraVENous PRN Christopher Shipley MD   10 mL at 25 0227    0.9 % sodium chloride infusion   IntraVENous PRN Christopher Shipley MD        potassium chloride (KLOR-CON M) extended release tablet 40 mEq  40 mEq Oral PRN Christopher Shipley MD        Or    potassium bicarb-citric acid (EFFER-K) effervescent tablet 40 mEq  40 mEq Oral PRN Christopher Shipley MD        Or    potassium chloride 10 mEq/100 mL IVPB (Peripheral Line)  10 mEq IntraVENous PRN Christopher Shipley MD        magnesium sulfate 2000 mg in 50 mL IVPB premix  2,000 mg IntraVENous PRN Christopher Shipley MD        enoxaparin (LOVENOX) injection 40 mg  40 mg SubCUTAneous Daily Christopher Shipley MD        ondansetron (ZOFRAN-ODT) disintegrating tablet 4 mg  4 mg Oral Q8H PRN Christopher Shipley MD        Or    ondansetron (ZOFRAN)

## 2025-06-27 NOTE — BRIEF OP NOTE
Brief Postoperative Note      Patient: Tawana Limon  YOB: 1985  MRN: [id number]    Date of Procedure: 6/27/2025    Pre-Op Diagnosis Codes:      * Left ureteral stone [N20.1] 2 left ureteral calculi proximal ureter approximately 3 mm in greatest dimension with high-grade obstruction urinary tract infection and  bacteremia    Post-Op Diagnosis: Same       Procedure(s):  CYSTOSCOPY RETROGRADE PYELOGRAM LEFT  STENT INSERTION and exam under anesthesia    Surgeon(s):  Saroj Hackett MD    Assistant:  None    Anesthesia: Monitor Anesthesia Care    Estimated Blood Loss (mL): None    Complications: None    Specimens:   Urine for culture left renal pelvis    Implants:  None      Drains: 6 x 28 ureteral stent left    Findings:  Infection Present At Time Of Surgery (PATOS) (choose all levels that have infection present):  - Superficial Infection (skin/subcutaneous) present as evidenced by purulent fluid  Other Findings: As above    Electronically signed by Saroj Hackett MD on 6/27/2025 at 3:55 PM   LVM> Advised pt to c/b to discuss imaging and f/u with Dr. Crump

## 2025-06-27 NOTE — CONSULTS
2025 12:10 PM  Service: Urology  Group: [facility] (Lew/Tcuker/Ramonita/Vamsi)    Tawana Limon  [id number]     Chief Complaint:    Left flank pain     History of Present Illness:      The patient is a 40 y.o. female patient known to our practice last seen in consult  3/20/20 who presented to ER with left flak pain. Ct two obstructing left proximal ureteral calculi. Pt with fever.       Past Medical History:   Diagnosis Date    28 weeks gestation of pregnancy 3/12/2020    35 weeks gestation of pregnancy 2020    38 weeks gestation of pregnancy 2020    39 weeks gestation of pregnancy 2020    Abnormal Pap smear of cervix     Chronic kidney disease     Knee effusion     Mental disorder     Postpartum depression     Pyelonephritis of right kidney 3/20/2020     (spontaneous vaginal delivery) 2020    Urinary tract infection in mother during second trimester of pregnancy 3/20/2020         Past Surgical History:   Procedure Laterality Date    DILATION AND CURETTAGE OF UTERUS N/A 2019    DILATATION AND CURETTAGE SUCTION performed by Clayton Head MD at [facility] OR       Medications Prior to Admission:    Medications Prior to Admission: acetaminophen (TYLENOL) 500 MG tablet, Take 1 tablet by mouth 4 times daily as needed for Pain  sodium chloride (OCEAN) 0.65 % nasal spray, 1 spray by Nasal route as needed for Congestion (Patient not taking: Reported on 2025)    Allergies:    Penicillins and Vicodin [hydrocodone-acetaminophen]    Social History:    reports that she has been smoking cigarettes. She has a 7.5 pack-year smoking history. She uses smokeless tobacco. She reports that she does not currently use alcohol. She reports that she does not use drugs.    Family History:   Non-contributory to this Urological problem  family history includes Allergy (Severe) in her nephew; Arthritis in her father and maternal grandfather; Breast Cancer in her mother; Cancer in her mother and paternal

## 2025-06-27 NOTE — ANESTHESIA POSTPROCEDURE EVALUATION
Department of Anesthesiology  Postprocedure Note    Patient: Tawana Limon  MRN: [id number]  YOB: 1985  Date of evaluation: 6/27/2025    Procedure Summary       Date: 06/27/25 Room / Location: [address]    Anesthesia Start: 1542 Anesthesia Stop: 1624    Procedure: CYSTOSCOPY RETROGRADE PYELOGRAM LEFT  STENT INSERTION - EXAM UNDER ANESTHESIA (Left: Bladder) Diagnosis:       Left ureteral stone      (Left ureteral stone [N20.1])    Surgeons: Saroj Hackett MD Responsible Provider: Carolina Ferreira MD    Anesthesia Type: MAC ASA Status: 3            Anesthesia Type: MAC    Oneil Phase I: Oneil Score: 10    Oneil Phase II:      Anesthesia Post Evaluation    Patient location during evaluation: PACU  Patient participation: complete - patient participated  Level of consciousness: awake  Airway patency: patent  Nausea & Vomiting: no nausea and no vomiting  Cardiovascular status: hemodynamically stable  Respiratory status: acceptable  Hydration status: euvolemic  Pain management: adequate    No notable events documented.

## 2025-06-27 NOTE — OP NOTE
[facility]              [address] [place] [address]                            OPERATIVE REPORT      PATIENT NAME: ARELY SARABIA                 : 1985  MED REC NO: [id number]                        ROOM: [facility]  ACCOUNT NO: [id number]                       ADMIT DATE: 2025  PROVIDER: Saroj Hackett MD      DATE OF PROCEDURE:  2025    SURGEON:  Saroj Hackett MD    PREOPERATIVE DIAGNOSES:  Two proximal left ureteral calculi causing pain, hydronephrosis, urinary tract infection, and bacteremia.    POSTOPERATIVE DIAGNOSES:  Two proximal left ureteral calculi causing pain, hydronephrosis, urinary tract infection, and bacteremia.    OPERATIONS:  Cystopanendoscopy, left retrograde pyelogram, left stent insertion, exam under anesthesia.    BLOOD LOSS:  In this case was none.    CONDITION:  Stable.    DISPOSITION:  Back to room.    DESCRIPTION OF PROCEDURE:  The time-out was read by me to Anesthesia and operating room staff; reviewed history, physical, allergy, and medication.  No additional antibiotics needed, she is receiving antibiotics on the floor.  A #21 panendoscope with an obturator was inserted into the urethra.  No stones, clots, or foreign bodies in the bladder.  No lesions compatible with serious inflammatory premalignant or obviously malignant change, nothing needed to be biopsied.  No cystitis cystica, enterovesical or extravesical fistula, or extrinsic imprints.  The bladder capacity was over 300 mL, which was normal.  The trigone was well developed and both ureteral orifices look singular and nonrefluxing.  A #5 open-ended catheter was inserted into the renal pelvis through the left ureteral orifice, which was normal and urine was grossly clear.  Retrograde pyelogram demonstrated some moderate hydronephrosis without extravasation.  A 6 x 28 J-stent was inserted, the renal end positioned with fluoroscopy, the bladder end under

## 2025-06-27 NOTE — DISCHARGE INSTRUCTIONS
Follow-up with PCP.  Complete antibiotic per ID  Follow-up with urology  Follow up with ID in 2 weeks.

## 2025-06-27 NOTE — H&P
Hospital Medicine History & Physical      PCP: No primary care provider on file.    Date of Admission: 2025    Date of Service: Pt seen/examined on 25 and Admitted to Inpatient with expected LOS greater than two midnights due to medical therapy.     Chief Complaint:  left flank pain      History Of Present Illness:     40 y.o. female who presented to [facility] with Left flank pain.  Patient reports a history of kidney stones in the past and never had any procedures he mentioned also times histologist stated.  Also does not recall ever passing the stone.  She reported subjective fevers at home as high as 101.  In the ER presentation temperature one 1.6.  Tachycardic at 110.  CT abdomen pelvis obtained showed 2 adjacent 2 mm obstructing calculi proximal left ureter with moderate left hydronephrosis and perinephric fat stranding.  She was given Rocephin.  ER did speak to urology recommended n.p.o. after midnight and admission.  Urinalysis positive nitrates 21-50 white blood cells and moderate leukocyte Estrace.  Leukocytosis of 15,200.  Patient is currently admitted for left kidney stone and pyelonephritis.    Past Medical History:          Diagnosis Date    28 weeks gestation of pregnancy 3/12/2020    35 weeks gestation of pregnancy 2020    38 weeks gestation of pregnancy 2020    39 weeks gestation of pregnancy 2020    Abnormal Pap smear of cervix     Chronic kidney disease     Knee effusion     Mental disorder     Postpartum depression     Pyelonephritis of right kidney 3/20/2020     (spontaneous vaginal delivery) 2020    Urinary tract infection in mother during second trimester of pregnancy 3/20/2020       Past Surgical History:          Procedure Laterality Date    DILATION AND CURETTAGE OF UTERUS N/A 2019    DILATATION AND CURETTAGE SUCTION performed by Clayton Head MD at [facility] OR       Medications Prior to Admission:      Prior to Admission medications

## 2025-06-27 NOTE — CONSULTS
Infectious Disease Consult Note     Admit Date: 6/26/2025  5:38 PM    Chief complaint: back pain     Reason for Consult:  complicated UTI     Requesting Physician:  Cassius Mckay MD      HISTORY OF PRESENT ILLNESS:    40 y.o. female who presented to [facility] with Left flank pain.  Patient reports a history of kidney stones in the past and never had any procedures he mentioned also times histologist stated.  Also does not recall ever passing the stone.  She reported subjective fevers at home as high as 101.  In the ER presentation temperature one 1.6.  Tachycardic at 110.  CT abdomen pelvis obtained showed 2 adjacent 2 mm obstructing calculi proximal left ureter with moderate left hydronephrosis and perinephric fat stranding.  She was given Rocephin.  Infectious disease now consulted for complicated UTI and further antibiotic recommendation    REVIEW OF SYSTEMS:    Constitutional:no Fever, chills or rigors. No unexplained weight loss.  HEENT: no headache, dizziness or lightheadedness. No sore throat or runny nose.  Respiratory: no cough, chest pain, shortness of breath or wheeze.  Cardiovascular: no chest pain or palpitations  Gastrointestinal: no nausea, vomiting, diarrhea, constipation. No blood in stool.  Genitourinary: no dysuria, hematuria, urgency, frequency or incontinence.  Musculoskeletal: no joint pain anywhere in body, or bodyache.  Neurologic: no h/o passing out, focal weakness or seizure.  Skin: no h/o rashes or easy bruising.  Hematologic: no gum bleeding or easy bruising. No hemoptysis.    MEDICAL HISTORY  Past Medical History:   Diagnosis Date    28 weeks gestation of pregnancy 3/12/2020    35 weeks gestation of pregnancy 6/19/2020    38 weeks gestation of pregnancy 6/19/2020    39 weeks gestation of pregnancy 6/19/2020    Abnormal Pap smear of cervix     Chronic kidney disease     Knee effusion     Mental disorder     Postpartum depression     Pyelonephritis of right kidney  deficits           LABS AND DATA REVIEW:     CT ABDOMEN PELVIS WO CONTRAST Additional Contrast? None  Result Date: 6/26/2025  Two adjacent 2 mm obstructing calculi proximal left ureter with mild left hydronephrosis and perinephric fat stranding. Punctate nonobstructing bilateral renal calculi. Colonic fecal retention.     LABS  Recent Labs     06/26/25 1844 06/27/25 0518   WBC 14.2* 13.9*   HGB 12.7 11.2*   HCT 40.3 35.5   MCV 82.6 83.1    196     Recent Labs     06/26/25  1844 06/27/25  0518    138   K 3.8 3.9    111*   CO2 23 18*   BUN 11 10   CREATININE 0.8 0.7   LABGLOM >90 >90   GLUCOSE 174* 106*   CALCIUM 9.0 7.7*   BILITOT 0.3  --    ALKPHOS 73  --    AST 42*  --    ALT 33  --      No results for input(s): \"PROCAL\" in the last 72 hours.  No results found for: \"CRP\"  No results found for: \"SEDRATE\"  Lab Results   Component Value Date    FEUDFQS6G6 see below 03/11/2020     Lab Results   Component Value Date/Time    COVID19 Not Detected 06/03/2021 03:05 PM            Lab Results   Component Value Date    HCVABI Non-Reactive 03/11/2020     Hep C Ab Interp   Date Value Ref Range Status   03/11/2020 Non-Reactive NON REACT Final     No results found for: \"HCVQNTNAAT\"  No results found for: \"HCVQNTNAATLG\"    MICROBIOLOGY:  Results       Procedure Component Value Units Date/Time    Culture, Urine [[id number]] Collected: 06/27/25 1609    Order Status: Sent Specimen: Urine, Cystoscopic     Culture, Blood 1 [[id number]] Collected: 06/26/25 2050    Order Status: Completed Specimen: Blood Updated: 06/26/25 2131     Specimen Description .BLOOD     Special Requests          Culture NO GROWTH <24 HRS    Culture, Blood 2 [[id number]]  (Abnormal) Collected: 06/26/25 2050    Order Status: Completed Specimen: Blood Updated: 06/27/25 1114     Specimen Description .BLOOD     Special Requests          [employer] test comment ANAEROBIC BLD BOTTLE     Direct Exam DIRECT GRAM STAIN FROM BOTTLE: GRAM NEGATIVE RODS

## 2025-06-27 NOTE — CARE COORDINATION
Care Coordination  Met with the patient at bedside to discuss transition plan of care. She lives in a 1 story home alone 4 steps to enter. She was independent prior to admission. She has no primary care physician. Offered to have a new pcp appt made and she agreed. Called Aimee Remy to have a new primary care physician appt made.Her pharmacy is [facility]. Her plan is to return home once stable. She was admitted with left flank pain. She has a pmh of kidney stones. She was running a fever of 101 and she was tachycardic at 110. Ct of the abdomen shows 2 stones 2 mm obstructed calculi in left ureter with left hydronephrosis. She also has acute pyelonephritis. Wbc 14.2. She is on Ivf at 100 cc hr, Iv dilaudid q3 hrs  and Iv Rocephin 1000 mg iv q24 hrs.           Case Management Assessment  Initial Evaluation    Date/Time of Evaluation: 6/27/2025 11:59 AM  Assessment Completed by: Sandy Rangel RN    If patient is discharged prior to next notation, then this note serves as note for discharge by case management.    Patient Name: Tawana Limon                   YOB: 1985  Diagnosis: Kidney stone [N20.0]  Pyelonephritis [N12]  Flank pain [R10.9]  Fever, unspecified fever cause [R50.9]  Urinary tract infection without hematuria, site unspecified [N39.0]                   Date / Time: 6/26/2025  5:38 PM    Patient Admission Status: Inpatient   Readmission Risk (Low < 19, Mod (19-27), High > 27): Readmission Risk Score: 5.9    Current PCP: No primary care provider on file.  PCP verified by ? No (has no pcp)    Chart Reviewed: Yes      History Provided by: Patient  Patient Orientation: Alert and Oriented    Patient Cognition: Alert    Hospitalization in the last 30 days (Readmission):  No    If yes, Readmission Assessment in  Navigator will be completed.    Advance Directives:      Code Status: Full Code   Patient's Primary Decision Maker is: Named in Scanned ACP Document      Discharge Planning:    Patient  lives with: Children, Spouse/Significant Other Type of Home: House  Primary Care Giver: Self  Patient Support Systems include: Friends/Neighbors   Current Financial resources:    Current community resources:    Current services prior to admission: None            Current DME:              Type of Home Care services:  None    ADLS  Prior functional level: Independent in ADLs/IADLs  Current functional level: Independent in ADLs/IADLs    PT AM-PAC:   /24  OT AM-PAC:   /24    Family can provide assistance at DC: Yes  Would you like Case Management to discuss the discharge plan with any other family members/significant others, and if so, who? Yes  Plans to Return to Present Housing: Yes  Other Identified Issues/Barriers to RETURNING to current housing: yes  Potential Assistance needed at discharge: N/A            Potential DME:    Patient expects to discharge to: House  Plan for transportation at discharge:      Financial    Payor: [employer] / Plan: [employer] [place] MEDICAID / Product Type: *No Product type* /     Does insurance require precert for SNF: Yes    Potential assistance Purchasing Medications: No  Meds-to-Beds request:        [facility]/pharmacy #[id number] - [place], [place] - [address] - P [phone] - F [phone]  [address] [place] [address]  Phone: [phone] Fax: [phone]      Notes:    Factors facilitating achievement of predicted outcomes: Family support    Barriers to discharge: Pain    Additional Case Management Notes: see cm note    The Plan for Transition of Care is related to the following treatment goals of Kidney stone [N20.0]  Pyelonephritis [N12]  Flank pain [R10.9]  Fever, unspecified fever cause [R50.9]  Urinary tract infection without hematuria, site unspecified [N39.0]    IF APPLICABLE: The Patient and/or patient representative Taawna and her family were provided with a choice of provider and agrees with the discharge plan. Freedom of choice list with basic dialogue

## 2025-06-28 LAB
ANION GAP SERPL CALCULATED.3IONS-SCNC: 8 MMOL/L (ref 7–16)
BASOPHILS # BLD: 0.07 K/UL (ref 0–0.2)
BASOPHILS NFR BLD: 1 % (ref 0–2)
BUN SERPL-MCNC: 7 MG/DL (ref 6–20)
CALCIUM SERPL-MCNC: 8.3 MG/DL (ref 8.6–10)
CHLORIDE SERPL-SCNC: 110 MMOL/L (ref 98–107)
CO2 SERPL-SCNC: 22 MMOL/L (ref 22–29)
CREAT SERPL-MCNC: 0.7 MG/DL (ref 0.5–1)
EOSINOPHIL # BLD: 0 K/UL (ref 0.05–0.5)
EOSINOPHILS RELATIVE PERCENT: 0 % (ref 0–6)
ERYTHROCYTE [DISTWIDTH] IN BLOOD BY AUTOMATED COUNT: 15.5 % (ref 11.5–15)
GFR, ESTIMATED: >90 ML/MIN/1.73M2
GLUCOSE SERPL-MCNC: 100 MG/DL (ref 74–99)
HCT VFR BLD AUTO: 36.3 % (ref 34–48)
HGB BLD-MCNC: 11.3 G/DL (ref 11.5–15.5)
LYMPHOCYTES NFR BLD: 0.81 K/UL (ref 1.5–4)
LYMPHOCYTES RELATIVE PERCENT: 10 % (ref 20–42)
MCH RBC QN AUTO: 26.1 PG (ref 26–35)
MCHC RBC AUTO-ENTMCNC: 31.1 G/DL (ref 32–34.5)
MCV RBC AUTO: 83.8 FL (ref 80–99.9)
MICROORGANISM SPEC CULT: ABNORMAL
MICROORGANISM SPEC CULT: NO GROWTH
MONOCYTES NFR BLD: 0.37 K/UL (ref 0.1–0.95)
MONOCYTES NFR BLD: 4 % (ref 2–12)
NEUTROPHILS NFR BLD: 85 % (ref 43–80)
NEUTS SEG NFR BLD: 7.24 K/UL (ref 1.8–7.3)
PLATELET # BLD AUTO: 185 K/UL (ref 130–450)
PMV BLD AUTO: 11 FL (ref 7–12)
POTASSIUM SERPL-SCNC: 3.9 MMOL/L (ref 3.5–5.1)
RBC # BLD AUTO: 4.33 M/UL (ref 3.5–5.5)
RBC # BLD: ABNORMAL 10*6/UL
SERVICE CMNT-IMP: ABNORMAL
SODIUM SERPL-SCNC: 140 MMOL/L (ref 136–145)
SPECIMEN DESCRIPTION: ABNORMAL
SPECIMEN DESCRIPTION: NORMAL
WBC OTHER # BLD: 8.5 K/UL (ref 4.5–11.5)

## 2025-06-28 PROCEDURE — 80048 BASIC METABOLIC PNL TOTAL CA: CPT

## 2025-06-28 PROCEDURE — 99233 SBSQ HOSP IP/OBS HIGH 50: CPT | Performed by: INTERNAL MEDICINE

## 2025-06-28 PROCEDURE — 2500000003 HC RX 250 WO HCPCS: Performed by: UROLOGY

## 2025-06-28 PROCEDURE — 6370000000 HC RX 637 (ALT 250 FOR IP): Performed by: STUDENT IN AN ORGANIZED HEALTH CARE EDUCATION/TRAINING PROGRAM

## 2025-06-28 PROCEDURE — 6360000002 HC RX W HCPCS: Performed by: UROLOGY

## 2025-06-28 PROCEDURE — 85025 COMPLETE CBC W/AUTO DIFF WBC: CPT

## 2025-06-28 PROCEDURE — 6370000000 HC RX 637 (ALT 250 FOR IP): Performed by: NURSE PRACTITIONER

## 2025-06-28 PROCEDURE — 2500000003 HC RX 250 WO HCPCS: Performed by: STUDENT IN AN ORGANIZED HEALTH CARE EDUCATION/TRAINING PROGRAM

## 2025-06-28 PROCEDURE — 6370000000 HC RX 637 (ALT 250 FOR IP): Performed by: INTERNAL MEDICINE

## 2025-06-28 PROCEDURE — 1200000000 HC SEMI PRIVATE

## 2025-06-28 PROCEDURE — 36415 COLL VENOUS BLD VENIPUNCTURE: CPT

## 2025-06-28 PROCEDURE — 6370000000 HC RX 637 (ALT 250 FOR IP): Performed by: UROLOGY

## 2025-06-28 RX ORDER — OXYBUTYNIN CHLORIDE 5 MG/1
5 TABLET ORAL 3 TIMES DAILY PRN
Status: DISCONTINUED | OUTPATIENT
Start: 2025-06-28 | End: 2025-06-29 | Stop reason: HOSPADM

## 2025-06-28 RX ORDER — OXYCODONE AND ACETAMINOPHEN 5; 325 MG/1; MG/1
1 TABLET ORAL EVERY 4 HOURS PRN
Refills: 0 | Status: DISCONTINUED | OUTPATIENT
Start: 2025-06-28 | End: 2025-06-29 | Stop reason: HOSPADM

## 2025-06-28 RX ORDER — SENNA AND DOCUSATE SODIUM 50; 8.6 MG/1; MG/1
1 TABLET, FILM COATED ORAL DAILY
Status: DISCONTINUED | OUTPATIENT
Start: 2025-06-28 | End: 2025-06-29 | Stop reason: HOSPADM

## 2025-06-28 RX ORDER — PHENAZOPYRIDINE HYDROCHLORIDE 100 MG/1
100 TABLET, FILM COATED ORAL
Status: DISCONTINUED | OUTPATIENT
Start: 2025-06-28 | End: 2025-06-29 | Stop reason: HOSPADM

## 2025-06-28 RX ADMIN — TRAMADOL HYDROCHLORIDE 50 MG: 50 TABLET, COATED ORAL at 18:13

## 2025-06-28 RX ADMIN — SODIUM CHLORIDE, PRESERVATIVE FREE 10 ML: 5 INJECTION INTRAVENOUS at 08:07

## 2025-06-28 RX ADMIN — SODIUM CHLORIDE, PRESERVATIVE FREE 10 ML: 5 INJECTION INTRAVENOUS at 12:39

## 2025-06-28 RX ADMIN — SENNOSIDES AND DOCUSATE SODIUM 1 TABLET: 8.6; 5 TABLET ORAL at 16:48

## 2025-06-28 RX ADMIN — OXYCODONE AND ACETAMINOPHEN 1 TABLET: 5; 325 TABLET ORAL at 20:24

## 2025-06-28 RX ADMIN — TRAMADOL HYDROCHLORIDE 50 MG: 50 TABLET, COATED ORAL at 08:09

## 2025-06-28 RX ADMIN — WATER 1000 MG: 1 INJECTION INTRAMUSCULAR; INTRAVENOUS; SUBCUTANEOUS at 12:38

## 2025-06-28 RX ADMIN — OXYBUTYNIN CHLORIDE 5 MG: 5 TABLET ORAL at 12:01

## 2025-06-28 RX ADMIN — IBUPROFEN 600 MG: 600 TABLET, FILM COATED ORAL at 21:52

## 2025-06-28 RX ADMIN — SODIUM CHLORIDE, PRESERVATIVE FREE 10 ML: 5 INJECTION INTRAVENOUS at 20:25

## 2025-06-28 RX ADMIN — OXYCODONE AND ACETAMINOPHEN 1 TABLET: 5; 325 TABLET ORAL at 12:42

## 2025-06-28 RX ADMIN — OXYCODONE AND ACETAMINOPHEN 1 TABLET: 5; 325 TABLET ORAL at 16:48

## 2025-06-28 RX ADMIN — ONDANSETRON 4 MG: 2 INJECTION, SOLUTION INTRAMUSCULAR; INTRAVENOUS at 20:58

## 2025-06-28 RX ADMIN — SODIUM CHLORIDE, PRESERVATIVE FREE 10 ML: 5 INJECTION INTRAVENOUS at 20:58

## 2025-06-28 RX ADMIN — PHENAZOPYRIDINE 100 MG: 100 TABLET ORAL at 12:01

## 2025-06-28 RX ADMIN — SODIUM CHLORIDE, PRESERVATIVE FREE 10 ML: 5 INJECTION INTRAVENOUS at 20:24

## 2025-06-28 RX ADMIN — SODIUM CHLORIDE, PRESERVATIVE FREE 10 ML: 5 INJECTION INTRAVENOUS at 08:08

## 2025-06-28 RX ADMIN — ACETAMINOPHEN 650 MG: 325 TABLET ORAL at 21:51

## 2025-06-28 RX ADMIN — PHENAZOPYRIDINE 100 MG: 100 TABLET ORAL at 16:48

## 2025-06-28 ASSESSMENT — PAIN - FUNCTIONAL ASSESSMENT
PAIN_FUNCTIONAL_ASSESSMENT: PREVENTS OR INTERFERES SOME ACTIVE ACTIVITIES AND ADLS

## 2025-06-28 ASSESSMENT — PAIN DESCRIPTION - FREQUENCY
FREQUENCY: CONTINUOUS

## 2025-06-28 ASSESSMENT — PAIN DESCRIPTION - ORIENTATION
ORIENTATION: LEFT
ORIENTATION: RIGHT

## 2025-06-28 ASSESSMENT — PAIN DESCRIPTION - ONSET
ONSET: ON-GOING
ONSET: ON-GOING

## 2025-06-28 ASSESSMENT — PAIN DESCRIPTION - DESCRIPTORS
DESCRIPTORS: ACHING;DISCOMFORT;TENDER
DESCRIPTORS: ACHING;DISCOMFORT;POUNDING
DESCRIPTORS: ACHING;DISCOMFORT;THROBBING
DESCRIPTORS: ACHING;DISCOMFORT;THROBBING
DESCRIPTORS: ACHING;THROBBING;DISCOMFORT
DESCRIPTORS: ACHING;DISCOMFORT;THROBBING

## 2025-06-28 ASSESSMENT — PAIN DESCRIPTION - LOCATION
LOCATION: ABDOMEN;FLANK
LOCATION: HEAD
LOCATION: ABDOMEN;FLANK
LOCATION: HEAD
LOCATION: ABDOMEN;FLANK
LOCATION: ARM
LOCATION: FLANK
LOCATION: BACK;ABDOMEN
LOCATION: ABDOMEN;FLANK
LOCATION: HEAD

## 2025-06-28 ASSESSMENT — PAIN DESCRIPTION - PAIN TYPE
TYPE: ACUTE PAIN

## 2025-06-28 ASSESSMENT — PAIN SCALES - GENERAL
PAINLEVEL_OUTOF10: 6
PAINLEVEL_OUTOF10: 7
PAINLEVEL_OUTOF10: 8
PAINLEVEL_OUTOF10: 2
PAINLEVEL_OUTOF10: 3
PAINLEVEL_OUTOF10: 7
PAINLEVEL_OUTOF10: 3
PAINLEVEL_OUTOF10: 0
PAINLEVEL_OUTOF10: 10
PAINLEVEL_OUTOF10: 0
PAINLEVEL_OUTOF10: 5
PAINLEVEL_OUTOF10: 0
PAINLEVEL_OUTOF10: 8
PAINLEVEL_OUTOF10: 10

## 2025-06-28 NOTE — PLAN OF CARE
Problem: Discharge Planning  Goal: Discharge to home or other facility with appropriate resources  6/28/2025 0859 by Elyssa Doherty, RN  Outcome: Progressing  6/27/2025 2236 by Vivienne Spencer, RN  Outcome: Progressing

## 2025-06-28 NOTE — PLAN OF CARE
Problem: Discharge Planning  Goal: Discharge to home or other facility with appropriate resources  6/27/2025 2236 by Vivienne Spencer, RN  Outcome: Progressing  6/27/2025 1211 by Wanda Randhawa, RN  Outcome: Progressing     Problem: Pain  Goal: Verbalizes/displays adequate comfort level or baseline comfort level  6/27/2025 2236 by Vivienne Spencer RN  Outcome: Progressing  6/27/2025 1211 by Wanda Randhawa, RN  Outcome: Progressing

## 2025-06-29 VITALS
HEART RATE: 78 BPM | OXYGEN SATURATION: 98 % | TEMPERATURE: 97.6 F | RESPIRATION RATE: 16 BRPM | BODY MASS INDEX: 32.58 KG/M2 | WEIGHT: 220 LBS | SYSTOLIC BLOOD PRESSURE: 107 MMHG | DIASTOLIC BLOOD PRESSURE: 60 MMHG | HEIGHT: 69 IN

## 2025-06-29 LAB
ACB COMPLEX DNA BLD POS QL NAA+NON-PROBE: NOT DETECTED
ANION GAP SERPL CALCULATED.3IONS-SCNC: 8 MMOL/L (ref 7–16)
B FRAGILIS DNA BLD POS QL NAA+NON-PROBE: NOT DETECTED
BASOPHILS # BLD: 0.05 K/UL (ref 0–0.2)
BASOPHILS NFR BLD: 1 % (ref 0–2)
BIOFIRE TEST COMMENT: ABNORMAL
BLACTX-M ISLT/SPM QL: NOT DETECTED
BLAIMP ISLT/SPM QL: NOT DETECTED
BLAKPC ISLT/SPM QL: NOT DETECTED
BLAOXA-48-LIKE ISLT/SPM QL: NOT DETECTED
BLAVIM ISLT/SPM QL: NOT DETECTED
BUN SERPL-MCNC: 7 MG/DL (ref 6–20)
C ALBICANS DNA BLD POS QL NAA+NON-PROBE: NOT DETECTED
C AURIS DNA BLD POS QL NAA+NON-PROBE: NOT DETECTED
C GATTII+NEOFOR DNA BLD POS QL NAA+N-PRB: NOT DETECTED
C GLABRATA DNA BLD POS QL NAA+NON-PROBE: NOT DETECTED
C KRUSEI DNA BLD POS QL NAA+NON-PROBE: NOT DETECTED
C PARAP DNA BLD POS QL NAA+NON-PROBE: NOT DETECTED
C TROPICLS DNA BLD POS QL NAA+NON-PROBE: NOT DETECTED
CALCIUM SERPL-MCNC: 8.5 MG/DL (ref 8.6–10)
CHLORIDE SERPL-SCNC: 108 MMOL/L (ref 98–107)
CO2 SERPL-SCNC: 23 MMOL/L (ref 22–29)
COLISTIN RES MCR-1 ISLT/SPM QL: NOT DETECTED
CREAT SERPL-MCNC: 0.7 MG/DL (ref 0.5–1)
E CLOAC COMP DNA BLD POS NAA+NON-PROBE: NOT DETECTED
E COLI DNA BLD POS QL NAA+NON-PROBE: DETECTED
E FAECALIS DNA BLD POS QL NAA+NON-PROBE: NOT DETECTED
E FAECIUM DNA BLD POS QL NAA+NON-PROBE: NOT DETECTED
ENTEROBACTERALES DNA BLD POS NAA+N-PRB: DETECTED
EOSINOPHIL # BLD: 0.28 K/UL (ref 0.05–0.5)
EOSINOPHILS RELATIVE PERCENT: 6 % (ref 0–6)
ERYTHROCYTE [DISTWIDTH] IN BLOOD BY AUTOMATED COUNT: 15.4 % (ref 11.5–15)
GFR, ESTIMATED: >90 ML/MIN/1.73M2
GLUCOSE SERPL-MCNC: 75 MG/DL (ref 74–99)
GP B STREP DNA BLD POS QL NAA+NON-PROBE: NOT DETECTED
HAEM INFLU DNA BLD POS QL NAA+NON-PROBE: NOT DETECTED
HCT VFR BLD AUTO: 34.8 % (ref 34–48)
HGB BLD-MCNC: 11 G/DL (ref 11.5–15.5)
IMM GRANULOCYTES # BLD AUTO: 0.05 K/UL (ref 0–0.58)
IMM GRANULOCYTES NFR BLD: 1 % (ref 0–5)
K OXYTOCA DNA BLD POS QL NAA+NON-PROBE: NOT DETECTED
KLEBSIELLA SP DNA BLD POS QL NAA+NON-PRB: NOT DETECTED
KLEBSIELLA SP DNA BLD POS QL NAA+NON-PRB: NOT DETECTED
L MONOCYTOG DNA BLD POS QL NAA+NON-PROBE: NOT DETECTED
LYMPHOCYTES NFR BLD: 1.02 K/UL (ref 1.5–4)
LYMPHOCYTES RELATIVE PERCENT: 20 % (ref 20–42)
MCH RBC QN AUTO: 26.1 PG (ref 26–35)
MCHC RBC AUTO-ENTMCNC: 31.6 G/DL (ref 32–34.5)
MCV RBC AUTO: 82.5 FL (ref 80–99.9)
MICROORGANISM SPEC CULT: ABNORMAL
MICROORGANISM SPEC CULT: NORMAL
MICROORGANISM/AGENT SPEC: ABNORMAL
MONOCYTES NFR BLD: 0.6 K/UL (ref 0.1–0.95)
MONOCYTES NFR BLD: 12 % (ref 2–12)
N MEN DNA BLD POS QL NAA+NON-PROBE: NOT DETECTED
NEUTROPHILS NFR BLD: 60 % (ref 43–80)
NEUTS SEG NFR BLD: 2.99 K/UL (ref 1.8–7.3)
P AERUGINOSA DNA BLD POS NAA+NON-PROBE: NOT DETECTED
PLATELET # BLD AUTO: 172 K/UL (ref 130–450)
PMV BLD AUTO: 11 FL (ref 7–12)
POTASSIUM SERPL-SCNC: 4.4 MMOL/L (ref 3.5–5.1)
PROTEUS SP DNA BLD POS QL NAA+NON-PROBE: NOT DETECTED
RBC # BLD AUTO: 4.22 M/UL (ref 3.5–5.5)
RESISTANT GENE NDM BY PCR: NOT DETECTED
S AUREUS DNA BLD POS QL NAA+NON-PROBE: NOT DETECTED
S AUREUS+CONS DNA BLD POS NAA+NON-PROBE: NOT DETECTED
S EPIDERMIDIS DNA BLD POS QL NAA+NON-PRB: NOT DETECTED
S LUGDUNENSIS DNA BLD POS QL NAA+NON-PRB: NOT DETECTED
S MALTOPHILIA DNA BLD POS QL NAA+NON-PRB: NOT DETECTED
S MARCESCENS DNA BLD POS NAA+NON-PROBE: NOT DETECTED
S PNEUM DNA BLD POS QL NAA+NON-PROBE: NOT DETECTED
S PYO DNA BLD POS QL NAA+NON-PROBE: NOT DETECTED
SALMONELLA DNA BLD POS QL NAA+NON-PROBE: NOT DETECTED
SERVICE CMNT-IMP: ABNORMAL
SERVICE CMNT-IMP: NORMAL
SODIUM SERPL-SCNC: 139 MMOL/L (ref 136–145)
SPECIMEN DESCRIPTION: ABNORMAL
SPECIMEN DESCRIPTION: NORMAL
STREPTOCOCCUS DNA BLD POS NAA+NON-PROBE: NOT DETECTED
WBC OTHER # BLD: 5 K/UL (ref 4.5–11.5)

## 2025-06-29 PROCEDURE — 2500000003 HC RX 250 WO HCPCS: Performed by: UROLOGY

## 2025-06-29 PROCEDURE — 85025 COMPLETE CBC W/AUTO DIFF WBC: CPT

## 2025-06-29 PROCEDURE — 6370000000 HC RX 637 (ALT 250 FOR IP): Performed by: INTERNAL MEDICINE

## 2025-06-29 PROCEDURE — 6370000000 HC RX 637 (ALT 250 FOR IP): Performed by: STUDENT IN AN ORGANIZED HEALTH CARE EDUCATION/TRAINING PROGRAM

## 2025-06-29 PROCEDURE — 6360000002 HC RX W HCPCS: Performed by: UROLOGY

## 2025-06-29 PROCEDURE — 2500000003 HC RX 250 WO HCPCS: Performed by: STUDENT IN AN ORGANIZED HEALTH CARE EDUCATION/TRAINING PROGRAM

## 2025-06-29 PROCEDURE — 99239 HOSP IP/OBS DSCHRG MGMT >30: CPT | Performed by: INTERNAL MEDICINE

## 2025-06-29 PROCEDURE — 80048 BASIC METABOLIC PNL TOTAL CA: CPT

## 2025-06-29 PROCEDURE — 36415 COLL VENOUS BLD VENIPUNCTURE: CPT

## 2025-06-29 PROCEDURE — 6370000000 HC RX 637 (ALT 250 FOR IP): Performed by: UROLOGY

## 2025-06-29 RX ORDER — OXYBUTYNIN CHLORIDE 5 MG/1
5 TABLET ORAL 3 TIMES DAILY PRN
Qty: 90 TABLET | Refills: 3 | Status: SHIPPED | OUTPATIENT
Start: 2025-06-29

## 2025-06-29 RX ORDER — LEVOFLOXACIN 750 MG/1
750 TABLET, FILM COATED ORAL DAILY
Qty: 14 TABLET | Refills: 0 | Status: SHIPPED | OUTPATIENT
Start: 2025-06-29 | End: 2025-07-13

## 2025-06-29 RX ORDER — PHENAZOPYRIDINE HYDROCHLORIDE 100 MG/1
100 TABLET, FILM COATED ORAL
Qty: 30 TABLET | Refills: 0 | Status: SHIPPED | OUTPATIENT
Start: 2025-06-29 | End: 2025-07-09

## 2025-06-29 RX ADMIN — PHENAZOPYRIDINE 100 MG: 100 TABLET ORAL at 07:59

## 2025-06-29 RX ADMIN — SODIUM CHLORIDE, PRESERVATIVE FREE 10 ML: 5 INJECTION INTRAVENOUS at 08:01

## 2025-06-29 RX ADMIN — TRAMADOL HYDROCHLORIDE 50 MG: 50 TABLET, COATED ORAL at 10:30

## 2025-06-29 RX ADMIN — SENNOSIDES AND DOCUSATE SODIUM 1 TABLET: 8.6; 5 TABLET ORAL at 07:57

## 2025-06-29 RX ADMIN — SODIUM CHLORIDE, PRESERVATIVE FREE 10 ML: 5 INJECTION INTRAVENOUS at 08:09

## 2025-06-29 RX ADMIN — PHENAZOPYRIDINE 100 MG: 100 TABLET ORAL at 12:23

## 2025-06-29 RX ADMIN — ENOXAPARIN SODIUM 40 MG: 100 INJECTION SUBCUTANEOUS at 07:56

## 2025-06-29 RX ADMIN — OXYCODONE AND ACETAMINOPHEN 1 TABLET: 5; 325 TABLET ORAL at 12:22

## 2025-06-29 RX ADMIN — WATER 1000 MG: 1 INJECTION INTRAMUSCULAR; INTRAVENOUS; SUBCUTANEOUS at 12:24

## 2025-06-29 RX ADMIN — OXYBUTYNIN CHLORIDE 5 MG: 5 TABLET ORAL at 08:00

## 2025-06-29 RX ADMIN — OXYCODONE AND ACETAMINOPHEN 1 TABLET: 5; 325 TABLET ORAL at 07:57

## 2025-06-29 ASSESSMENT — PAIN DESCRIPTION - DESCRIPTORS
DESCRIPTORS: ACHING;DULL;SORE
DESCRIPTORS: DULL;CRAMPING;ACHING

## 2025-06-29 ASSESSMENT — PAIN DESCRIPTION - ORIENTATION
ORIENTATION: LEFT
ORIENTATION: LEFT
ORIENTATION: MID;LEFT

## 2025-06-29 ASSESSMENT — PAIN - FUNCTIONAL ASSESSMENT
PAIN_FUNCTIONAL_ASSESSMENT: ACTIVITIES ARE NOT PREVENTED

## 2025-06-29 ASSESSMENT — PAIN DESCRIPTION - LOCATION
LOCATION: ABDOMEN;BACK
LOCATION: ABDOMEN;BACK
LOCATION: BACK

## 2025-06-29 ASSESSMENT — PAIN SCALES - GENERAL
PAINLEVEL_OUTOF10: 7
PAINLEVEL_OUTOF10: 5
PAINLEVEL_OUTOF10: 6

## 2025-06-29 NOTE — PLAN OF CARE
Problem: Discharge Planning  Goal: Discharge to home or other facility with appropriate resources  6/29/2025 1041 by Autumn Mason RN  Outcome: Progressing  6/28/2025 2320 by Vivienne Spencer RN  Outcome: Progressing  6/28/2025 2319 by Vivienne Spencer RN  Outcome: Progressing     Problem: Pain  Goal: Verbalizes/displays adequate comfort level or baseline comfort level  6/29/2025 1041 by Autumn Mason RN  Outcome: Progressing  6/28/2025 2320 by Vivienne Spencer RN  Outcome: Progressing  6/28/2025 2319 by Vivienne Spencer RN  Outcome: Progressing     Problem: Safety - Adult  Goal: Free from fall injury  6/29/2025 1041 by Autumn Mason RN  Outcome: Progressing  6/28/2025 2320 by Vivienne Spencer RN  Outcome: Progressing  6/28/2025 2319 by Vivienne Spencer RN  Outcome: Progressing

## 2025-06-29 NOTE — DISCHARGE INSTR - COC
Continuity of Care Form    Patient Name: Tawana Limon   :  1985  MRN:  [id number]    Admit date:  2025  Discharge date:  ***    Code Status Order: Full Code   Advance Directives:    Date/Time Healthcare Directive Type of Healthcare Directive Copy in Chart Healthcare Agent Appointed Healthcare Agent's Name Healthcare Agent's Phone Number    25 1511 No, patient does not have an advance directive for healthcare treatment  --  --  --  --  --             Admitting Physician:  Christopher Shipley MD  PCP: No primary care provider on file.    Discharging Nurse: ***  Discharging Hospital Unit/Room#: 5402/5402-A  Discharging Unit Phone Number: ***    Emergency Contact:   Extended Emergency Contact Information  Primary Emergency Contact: Tayo Rebolledo  Home Phone: [phone]  Relation: Other   needed? No    Past Surgical History:  Past Surgical History:   Procedure Laterality Date    DILATION AND CURETTAGE OF UTERUS N/A 2019    DILATATION AND CURETTAGE SUCTION performed by Clayton Head MD at [facility] OR       Immunization History:   Immunization History   Administered Date(s) Administered    COVID-19, PFIZER GRAY top, DO NOT Dilute, (age 12 y+), IM, 30 mcg/0.3 mL 2022    COVID-19, PFIZER PURPLE top, DILUTE for use, (age 12 y+), 30mcg/0.3mL 2021, 2021    TDaP, ADACEL (age 10y-64y), BOOSTRIX (age 10y+), IM, 0.5mL 2020, 2021, 2022       Active Problems:  Patient Active Problem List   Diagnosis Code    Incomplete  O03.4    Right kidney stone N20.0    Pyelonephritis of right kidney N12    38 weeks gestation of pregnancy Z3A.38     (spontaneous vaginal delivery) O80    Back pain affecting pregnancy in third trimester O99.891, M54.9    Flank pain R10.9    Pregnancy related condition O26.90    Complication of pregnancy in third trimester O26.93    Term pregnancy Z34.90    Pyelonephritis N12       Isolation/Infection:   Isolation            No

## 2025-06-29 NOTE — PROGRESS NOTES
[facility]  [facility]  Progress Note    Chief complain: Complicated UTI    SUBJECTIVE:    Patient is awake, alert , tolerating antibiotics well     ROS:  Constitutional:  denies fever , chills   Cardiovascular: denies chest pain or palpitation   Gastrointestinal: . Denies abdominal pain, diarrhea, no nausea or vomiting  Genitourinary:      Denies  dysuria or burning micturition  Musculoskeletal: no aches or pain   Allergic/Immunologic:  No rash or itching     OBJECTIVE:    Vitals:    06/27/25 2126 06/27/25 2351 06/28/25 0021 06/28/25 0740   BP: 125/60   (!) 149/79   Pulse: (!) 116   98   Resp: 18 20 18    Temp: 98.3 °F (36.8 °C)   97.6 °F (36.4 °C)   TempSrc: Temporal   Temporal   SpO2:    100%   Weight:       Height:           HEENT :         unremarkable   Heart:             RRR,  No murmurs, no gallops  Lungs:            clear to auscultation, no wheezing , no rales  Abdomen:       soft, non tender, bowel sounds present  Extremites:     No edema, no ulcers,  Skin:                Normal turgor,normal texture  Lines :             peripheral              Carroll catheter :   absent  Wound : none present                        Current Facility-Administered Medications   Medication Dose Route Frequency Provider Last Rate Last Admin    oxyBUTYnin (DITROPAN) tablet 5 mg  5 mg Oral TID PRN Vinod Agustin MD   5 mg at 06/28/25 1201    phenazopyridine (PYRIDIUM) tablet 100 mg  100 mg Oral TID  Vinod Agustin MD   100 mg at 06/28/25 1201    oxyCODONE-acetaminophen (PERCOCET) 5-325 MG per tablet 1 tablet  1 tablet Oral Q4H PRN Mady Arrieta MD   1 tablet at 06/28/25 1242    sennosides-docusate sodium (SENOKOT-S) 8.6-50 MG tablet 1 tablet  1 tablet Oral Daily Mady Arrieta MD        naloxone 0.4 mg in 10 mL sodium chloride syringe   IntraVENous PRN Carolina Ferreira MD        sodium chloride flush 0.9 % injection 5-40 mL  5-40 mL IntraVENous 2 times per day Jhon 
       [facility] Hospitalist Progress Note    Admitting Date and Time: 6/26/2025  5:38 PM  Admit Dx: Kidney stone [N20.0]  Pyelonephritis [N12]  Flank pain [R10.9]  Fever, unspecified fever cause [R50.9]  Urinary tract infection without hematuria, site unspecified [N39.0]    Subjective:  Patient is being followed for Kidney stone [N20.0]  Pyelonephritis [N12]  Flank pain [R10.9]  Fever, unspecified fever cause [R50.9]  Urinary tract infection without hematuria, site unspecified [N39.0]   Patient seen and examined.  Events reviewed.    ROS: denies fever, chills, cp, sob, n/v, HA unless stated above.      phenazopyridine  100 mg Oral TID WC    sennosides-docusate sodium  1 tablet Oral Daily    sodium chloride flush  5-40 mL IntraVENous 2 times per day    sodium chloride flush  5-40 mL IntraVENous 2 times per day    enoxaparin  40 mg SubCUTAneous Daily    cefTRIAXone (ROCEPHIN) IV  1,000 mg IntraVENous Q24H     oxyBUTYnin, 5 mg, TID PRN  oxyCODONE-acetaminophen, 1 tablet, Q4H PRN  naloxone 0.4 mg in 10 mL sodium chloride syringe, , PRN  sodium chloride flush, 5-40 mL, PRN  sodium chloride, , PRN  HYDROmorphone, 0.25 mg, Q5 Min PRN  HYDROmorphone, 0.5 mg, Q5 Min PRN  prochlorperazine, 5 mg, Once PRN  ibuprofen, 600 mg, Q6H PRN  sodium chloride flush, 5-40 mL, PRN  sodium chloride, , PRN  potassium chloride, 40 mEq, PRN   Or  potassium alternative oral replacement, 40 mEq, PRN   Or  potassium chloride, 10 mEq, PRN  magnesium sulfate, 2,000 mg, PRN  ondansetron, 4 mg, Q8H PRN   Or  ondansetron, 4 mg, Q6H PRN  polyethylene glycol, 17 g, Daily PRN  acetaminophen, 650 mg, Q6H PRN   Or  acetaminophen, 650 mg, Q6H PRN  traMADol, 50 mg, Q6H PRN         Objective:    /60   Pulse 78   Temp 97.6 °F (36.4 °C)   Resp 16   Ht 1.753 m (5' 9\")   Wt 99.8 kg (220 lb)   SpO2 98%   BMI 32.49 kg/m²     General Appearance: alert and oriented to person, place and time and in no acute distress  Skin: warm and dry  Head: 
       [facility] Hospitalist Progress Note    Admitting Date and Time: 6/26/2025  5:38 PM  Admit Dx: Kidney stone [N20.0]  Pyelonephritis [N12]  Flank pain [R10.9]  Fever, unspecified fever cause [R50.9]  Urinary tract infection without hematuria, site unspecified [N39.0]    Subjective:  Patient is being followed for Kidney stone [N20.0]  Pyelonephritis [N12]  Flank pain [R10.9]  Fever, unspecified fever cause [R50.9]  Urinary tract infection without hematuria, site unspecified [N39.0]   Patient seen and examined.  Events reviewed.  Still having significant pain, unrelieved with Ultram.    ROS: denies fever, chills, cp, sob, n/v, HA unless stated above.      phenazopyridine  100 mg Oral TID WC    sodium chloride flush  5-40 mL IntraVENous 2 times per day    sodium chloride flush  5-40 mL IntraVENous 2 times per day    enoxaparin  40 mg SubCUTAneous Daily    cefTRIAXone (ROCEPHIN) IV  1,000 mg IntraVENous Q24H     oxyBUTYnin, 5 mg, TID PRN  oxyCODONE-acetaminophen, 1 tablet, Q4H PRN  naloxone 0.4 mg in 10 mL sodium chloride syringe, , PRN  sodium chloride flush, 5-40 mL, PRN  sodium chloride, , PRN  HYDROmorphone, 0.25 mg, Q5 Min PRN  HYDROmorphone, 0.5 mg, Q5 Min PRN  prochlorperazine, 5 mg, Once PRN  ibuprofen, 600 mg, Q6H PRN  sodium chloride flush, 5-40 mL, PRN  sodium chloride, , PRN  potassium chloride, 40 mEq, PRN   Or  potassium alternative oral replacement, 40 mEq, PRN   Or  potassium chloride, 10 mEq, PRN  magnesium sulfate, 2,000 mg, PRN  ondansetron, 4 mg, Q8H PRN   Or  ondansetron, 4 mg, Q6H PRN  polyethylene glycol, 17 g, Daily PRN  acetaminophen, 650 mg, Q6H PRN   Or  acetaminophen, 650 mg, Q6H PRN  traMADol, 50 mg, Q6H PRN         Objective:    BP (!) 149/79   Pulse 98   Temp 97.6 °F (36.4 °C) (Temporal)   Resp 18   Ht 1.753 m (5' 9\")   Wt 99.8 kg (220 lb)   SpO2 100%   BMI 32.49 kg/m²     General Appearance: alert and oriented to person, place and time and in no acute distress  Skin: warm 
    6/28/2025 9:28 AM  Tawana iLmon  [id number]    Subjective: Patient having significant bladder spasms and pain this morning.  It appears her blood cultures are positive.  She had a stent placement yesterday.      Scheduled Meds:   sodium chloride flush  5-40 mL IntraVENous 2 times per day    sodium chloride flush  5-40 mL IntraVENous 2 times per day    enoxaparin  40 mg SubCUTAneous Daily    cefTRIAXone (ROCEPHIN) IV  1,000 mg IntraVENous Q24H       Objective:  Vitals:    06/28/25 0740   BP: (!) 149/79   Pulse: 98   Resp:    Temp: 97.6 °F (36.4 °C)   SpO2: 100%         Allergies: Penicillins and Vicodin [hydrocodone-acetaminophen]    General Appearance: alert and oriented to person, place and time and in moderate distress from pain  Skin: no rash or erythema  Head: normocephalic and atraumatic  Pulmonary/Chest: normal air movement, no respiratory distress  Abdomen: soft, non-tender, non-distended  Genitourinary: No Carroll  Extremities: no cyanosis, clubbing or edema         Labs:     Recent Labs     06/28/25  0718      K 3.9   *   CO2 22   BUN 7   CREATININE 0.7   GLUCOSE 100*   CALCIUM 8.3*       Lab Results   Component Value Date/Time    HGB 11.3 06/28/2025 07:18 AM    HCT 36.3 06/28/2025 07:18 AM       No results found for: \"PSA\"      Assessment/Plan:  Postop day 1 left ureteral stent placement  Septicemia of urinary origin    I think her pain is a combination of the infection and bladder spasms.  I will verify that she is on maximal medical therapy for her stent.  Continue with antibiotic therapy.  It might be a good idea to get infectious disease on board if not already consulted given her positive blood cultures.  Our service will continue to follow along this hospitalization.    Vinod Agustin MD   [facility]  Urology     
    6/29/2025 8:46 AM  Tawana Limon  [id number]    Subjective: Patient is significantly improved today, feels much better, symptoms are under control now      Scheduled Meds:   phenazopyridine  100 mg Oral TID WC    sennosides-docusate sodium  1 tablet Oral Daily    sodium chloride flush  5-40 mL IntraVENous 2 times per day    sodium chloride flush  5-40 mL IntraVENous 2 times per day    enoxaparin  40 mg SubCUTAneous Daily    cefTRIAXone (ROCEPHIN) IV  1,000 mg IntraVENous Q24H       Objective:  Vitals:    06/29/25 0757   BP:    Pulse:    Resp: 16   Temp:    SpO2:          Allergies: Penicillins and Vicodin [hydrocodone-acetaminophen]    General Appearance: alert and oriented to person, place and time and in no acute distress  Skin: no rash or erythema  Head: normocephalic and atraumatic  Pulmonary/Chest: normal air movement, no respiratory distress  Abdomen: soft, non-tender, non-distended  Genitourinary: No Carroll  Extremities: no cyanosis, clubbing or edema         Labs:     Recent Labs     06/29/25  0725      K 4.4   *   CO2 23   BUN 7   CREATININE 0.7   GLUCOSE 75   CALCIUM 8.5*       Lab Results   Component Value Date/Time    HGB 11.0 06/29/2025 07:25 AM    HCT 34.8 06/29/2025 07:25 AM       No results found for: \"PSA\"      Assessment/Plan:  Postop day 2 left ureteral stent placement  Septicemia of urinary origin    Symptoms are under control  Cleared for discharge from our perspective  Antibiotics per ID  She will need to be scheduled for a second procedure to clear the stone in the coming weeks.    Vinod Agustin MD   [facility]  Urology     
4 Eyes Skin Assessment     NAME:  Tawana Limon  YOB: 1985  MEDICAL RECORD NUMBER:  [id number]    The patient is being assessed for  Post-Op Surgical    I agree that at least one RN has performed a thorough Head to Toe Skin Assessment on the patient. ALL assessment sites listed below have been assessed.      Areas assessed by both nurses:    Head, Face, Ears, Shoulders, Back, Chest, Arms, Elbows, Hands, Sacrum. Buttock, Coccyx, Ischium, and Legs. Feet and Heels        Does the Patient have a Wound? No noted wound(s)     Scratch noted on inside of right wrist  Victorino Prevention initiated by RN: Yes  Wound Care Orders initiated by RN: No    Pressure Injury (Stage 1,2,3,4, Unstageable, DTI, NWPT, and Complex wounds) if present, place Wound referral order by RN under : No    New Ostomies, if present place, Ostomy referral order under : No     Nurse 1 eSignature: Electronically signed by Malissa Alexander RN on 6/27/25 at 5:49 PM EDT    **SHARE this note so that the co-signing nurse can place an eSignature**    Nurse 2 eSignature: {Esignature:[id number]}  
4 Eyes Skin Assessment     NAME:  Tawana Limon  YOB: 1985  MEDICAL RECORD NUMBER:  [id number]    The patient is being assessed for  Admission    I agree that at least one RN has performed a thorough Head to Toe Skin Assessment on the patient. ALL assessment sites listed below have been assessed.      Areas assessed by both nurses:    Head, Face, Ears, Shoulders, Back, Chest, Arms, Elbows, Hands, Sacrum. Buttock, Coccyx, Ischium, and Legs. Feet and Heels        Does the Patient have a Wound? No noted wound(s)       Victorino Prevention initiated by RN: No  Wound Care Orders initiated by RN: No    Pressure Injury (Stage 1,2,3,4, Unstageable, DTI, NWPT, and Complex wounds) if present, place Wound referral order by RN under : No    New Ostomies, if present place, Ostomy referral order under : No     Nurse 1 eSignature: Electronically signed by Rebeca Caldwell RN on 6/27/25 at 2:59 AM EDT    **SHARE this note so that the co-signing nurse can place an eSignature**    Nurse 2 eSignature: Electronically signed by Soo Jack RN on 6/27/25 at 3:00 AM EDT  
Explained to patient that she will be going to surgery today. All jewelery removed, treatment consent signed. Patient understands that she will need a surgical bath, but reports that she is too nauseous and in too much pain at this time to bathe.Patient will bathe the next time she gets up to use the restroom.   
ID consult completed,  assigned to patient.   
Perfect serve dr. Jeong to reach out to dr. Strong to send abx script to her pharmacy   
Tawana Limon is a 40 y.o. female patient.    Current Facility-Administered Medications   Medication Dose Route Frequency Provider Last Rate Last Admin    HYDROmorphone (DILAUDID) injection 0.25 mg  0.25 mg IntraVENous Q4H PRN Gretchen Morris APRN - CNP   0.25 mg at 06/27/25 0634    sodium chloride flush 0.9 % injection 5-40 mL  5-40 mL IntraVENous 2 times per day Christopher Shipley MD        sodium chloride flush 0.9 % injection 5-40 mL  5-40 mL IntraVENous PRN Christopher Shipley MD   10 mL at 06/27/25 0227    0.9 % sodium chloride infusion   IntraVENous PRN Christopher Shipley MD        potassium chloride (KLOR-CON M) extended release tablet 40 mEq  40 mEq Oral PRN Christopher Shipley MD        Or    potassium bicarb-citric acid (EFFER-K) effervescent tablet 40 mEq  40 mEq Oral PRN Christopher Shipley MD        Or    potassium chloride 10 mEq/100 mL IVPB (Peripheral Line)  10 mEq IntraVENous PRN Christopher Shipley MD        magnesium sulfate 2000 mg in 50 mL IVPB premix  2,000 mg IntraVENous PRN Christopher Shipley MD        enoxaparin (LOVENOX) injection 40 mg  40 mg SubCUTAneous Daily Christopher Shipley MD        ondansetron (ZOFRAN-ODT) disintegrating tablet 4 mg  4 mg Oral Q8H PRN Christopher Shipley MD        Or    ondansetron (ZOFRAN) injection 4 mg  4 mg IntraVENous Q6H PRN Christopher Shipley MD   4 mg at 06/27/25 0644    polyethylene glycol (GLYCOLAX) packet 17 g  17 g Oral Daily PRN Christopher Shipley MD        acetaminophen (TYLENOL) tablet 650 mg  650 mg Oral Q6H PRN Christopher Shipley MD        Or    acetaminophen (TYLENOL) suppository 650 mg  650 mg Rectal Q6H PRN Christopher Shipley MD        0.9 % sodium chloride infusion   IntraVENous Continuous Christopher Shipley  mL/hr at 06/26/25 2333 New Bag at 06/26/25 2333    cefTRIAXone (ROCEPHIN) 1,000 mg in sterile water 10 mL IV syringe  1,000 mg IntraVENous Q24H Christopher Shipley MD        traMADol (ULTRAM) tablet 50 mg  
                      Date of Procedure: 6/27/2025     Pre-Op Diagnosis Codes:      * Left ureteral stone N20.1 2 left ureteral calculi proximal ureter approximately 3 mm in greatest dimension with high-grade obstruction urinary tract infection and  bacteremia     Post-Op Diagnosis: Same       Procedure(s):  CYSTOSCOPY RETROGRADE PYELOGRAM LEFT  STENT INSERTION and exam under anesthesia           ASSESSMENT & PLAN     E. coli sepsis  Complicated UTI secondary to left ureteral stone  Left hydronephrosis--obstructive uropathy  Status post cystoscopy with retrograde pyelogram and stent placement in the left ureter     continue with ceftriaxone 2 g IV daily   We will continue patient on IV ceftriaxone for now and upon discharge patient to go home on oral antibiotic levofloxacin for 14 days    Ok to discharge from ID point of view    Follow up with ID physician in 2 weeks        Toni Strong MD, SOPHIA PASCUAL  6/29/2025  11:09 AM

## 2025-06-29 NOTE — PLAN OF CARE
Problem: Discharge Planning  Goal: Discharge to home or other facility with appropriate resources  6/28/2025 2320 by Vivienne Spencer RN  Outcome: Progressing  6/28/2025 2319 by Vivienne Spencer RN  Outcome: Progressing     Problem: Pain  Goal: Verbalizes/displays adequate comfort level or baseline comfort level  6/28/2025 2320 by Vivienne Spencer RN  Outcome: Progressing  6/28/2025 2319 by Vivienne Spencer RN  Outcome: Progressing  6/28/2025 1600 by Sharmin Armstrong RN  Outcome: Progressing     Problem: Safety - Adult  Goal: Free from fall injury  6/28/2025 2320 by Vivienne Spencer RN  Outcome: Progressing  6/28/2025 2319 by Vivienne Spencer RN  Outcome: Progressing

## 2025-06-29 NOTE — DISCHARGE SUMMARY
free fluid or free air. Bones/Soft Tissues: Unremarkable     Two adjacent 2 mm obstructing calculi proximal left ureter with mild left hydronephrosis and perinephric fat stranding. Punctate nonobstructing bilateral renal calculi. Colonic fecal retention.       Patient Instructions:      Medication List        START taking these medications      oxyBUTYnin 5 MG tablet  Commonly known as: DITROPAN  Take 1 tablet by mouth 3 times daily as needed (Bladder spasms/pelvic pain)     phenazopyridine 100 MG tablet  Commonly known as: PYRIDIUM  Take 1 tablet by mouth 3 times daily (with meals) for 10 days            CONTINUE taking these medications      acetaminophen 500 MG tablet  Commonly known as: TYLENOL  Take 1 tablet by mouth 4 times daily as needed for Pain            STOP taking these medications      sodium chloride 0.65 % nasal spray  Commonly known as: OCEAN               Where to Get Your Medications        These medications were sent to [facility]/pharmacy #[id number] - [place], [place] - [address] - P [phone] - F [phone]  [address] [place] [address]      Phone: [phone]   oxyBUTYnin 5 MG tablet  phenazopyridine 100 MG tablet         35 minutes was spent in preparing discharge papers, discussing discharge with patient, medication review, etc.    Signed:  Electronically signed by Mady Arrieta MD on 6/29/2025 at 12:00 PM

## 2025-06-29 NOTE — PLAN OF CARE
Problem: Discharge Planning  Goal: Discharge to home or other facility with appropriate resources  Outcome: Progressing     Problem: Pain  Goal: Verbalizes/displays adequate comfort level or baseline comfort level  6/28/2025 2319 by Vivienne Spencer, RN  Outcome: Progressing  6/28/2025 1600 by Sharmin Armstrong, RN  Outcome: Progressing     Problem: Safety - Adult  Goal: Free from fall injury  Outcome: Progressing

## 2025-06-29 NOTE — CARE COORDINATION
Care Coordination: discharge order noted.  ID plan to oral s for 14 days. PCP appointment noted scheduled for next week. No needs noted.

## 2025-07-01 LAB
MICROORGANISM SPEC CULT: NORMAL
SERVICE CMNT-IMP: NORMAL
SPECIMEN DESCRIPTION: NORMAL

## 2025-07-03 ENCOUNTER — OFFICE VISIT (OUTPATIENT)
Age: 40
End: 2025-07-03
Payer: COMMERCIAL

## 2025-07-03 VITALS
HEIGHT: 69 IN | DIASTOLIC BLOOD PRESSURE: 66 MMHG | HEART RATE: 90 BPM | OXYGEN SATURATION: 97 % | BODY MASS INDEX: 31.99 KG/M2 | WEIGHT: 216 LBS | RESPIRATION RATE: 14 BRPM | TEMPERATURE: 98.1 F | SYSTOLIC BLOOD PRESSURE: 113 MMHG

## 2025-07-03 DIAGNOSIS — G43.909 MIGRAINE WITHOUT STATUS MIGRAINOSUS, NOT INTRACTABLE, UNSPECIFIED MIGRAINE TYPE: ICD-10-CM

## 2025-07-03 DIAGNOSIS — N10 ACUTE PYELONEPHRITIS: ICD-10-CM

## 2025-07-03 DIAGNOSIS — N20.0 RENAL CALCULUS, LEFT: ICD-10-CM

## 2025-07-03 DIAGNOSIS — N39.0 COMPLICATED UTI (URINARY TRACT INFECTION): Primary | ICD-10-CM

## 2025-07-03 DIAGNOSIS — Z09 HOSPITAL DISCHARGE FOLLOW-UP: ICD-10-CM

## 2025-07-03 PROCEDURE — 99213 OFFICE O/P EST LOW 20 MIN: CPT

## 2025-07-03 PROCEDURE — 1111F DSCHRG MED/CURRENT MED MERGE: CPT

## 2025-07-03 PROCEDURE — G8417 CALC BMI ABV UP PARAM F/U: HCPCS

## 2025-07-03 PROCEDURE — 99214 OFFICE O/P EST MOD 30 MIN: CPT

## 2025-07-03 PROCEDURE — G8427 DOCREV CUR MEDS BY ELIG CLIN: HCPCS

## 2025-07-03 PROCEDURE — 4004F PT TOBACCO SCREEN RCVD TLK: CPT

## 2025-07-03 NOTE — PATIENT INSTRUCTIONS
Dear Tawana Limon,    Thank you for coming to your appointment at [facility].    Please make sure to do the following:    - Continue medications as listed and contact our office if medications are unavailable at the pharmacy.    - If lab work was ordered please complete as instructed.    - If a referral was placed to another doctor's office, please contact our office in 5 business days if you have not heard from that office regarding the referral.    - If any imaging test was ordered at today's visit (ultrasound, CT scan, or MRI), expect a phone call to schedule in the next 5 business days. You may also call [facility] Imaging Scheduling department at [phone] to schedule.    Contact our office if you develop any new or worsening symptoms or if you have any questions regarding today's visit.    We aim to address your healthcare needs to your satisfaction!    Sincerely,  Hemant Smith MD  7/3/2025  10:13 AM

## 2025-07-03 NOTE — PROGRESS NOTES
Yeah I had trouble to    [facility]  [facility] Medicine [facility]    Attending Physician Statement  I have discussed the case, including pertinent history and exam findings with the resident physician.I have seen and examined the patient and the key elements of the encounter have been performed by me. I agree with the assessment, plan and orders as documented by the resident. I have reviewed the relevant PMHx, PSHx, FamHx, SocialHx, medications, and allergies and updated history as appropriate.    Patient presents for hospital follow up appointment   Patient was seen at bedside with the PGY 2  40-year-old woman recently had hydronephrosis from a stone  Had a stent placed  Treated for E. coli sepsis and UTI  She is currently feeling better  Follow up with ID and urology    Remainder of medical problems as per resident note.        Oscar Vaz MD  7/3/2025 3:23 PM    
(around 10/3/2025), or if symptoms worsen or fail to improve.      I have reviewed my findings and recommendations with Tawana Limon and Dr. Milind Smith MD   7/3/2025 10:26 AM

## 2025-07-15 NOTE — PROGRESS NOTES
[facility] PRE-ADMISSION TESTING INSTRUCTIONS: [phone]  [address] [place] [address]    The Preadmission Testing patient is instructed accordingly using the following criteria (check applicable):    ARRIVAL INSTRUCTIONS:     Arrival Time: 0745    [x] Parking the day of Surgery is located in the Main Entrance lot.  Upon entering through the main entrance (Entrance A) make an immediate right to the surgery reception desk    [x] Bring photo ID and insurance card    [x] Bring in a copy of Living will or Durable Power of  papers.    [x] Please be sure to arrange for a responsible adult to provide transportation to and from the hospital    [x] Please arrange for a responsible adult to be with you for the 24 hour period post procedure, due to having anesthesia    [x] Please notify surgeon if you develop any illness between now and time of surgery (cold, cough, sore throat, fever, nausea, vomiting) or any signs of infections  including skin, wounds, and dental.    [x] If you awake am of surgery not feeling well or have temperature >100 please call [phone].    GENERAL INSTRUCTIONS:    [x] NOTHING BY MOUTH AFTER MIDNIGHT. You may only have up to 8oz of water from midnight until 4 hours prior to surgery. No gum, no candy, no mints.     [x] You may brush your teeth    [x] Take medications as instructed: none    [x] Stop herbal supplements and vitamins 5 days prior to procedure    [x] Shower or bath with soap, lather and rinse well, AM of Surgery, no lotion, powders or creams to surgical site    [x] Please do not wear any nail polish, make up, hair products, body spray, aftershave, cologne or perfume on the day of surgery    [x] Jewelry, body piercings, eyeglasses, contact lenses and dentures are not permitted into surgery (bring cases)    [x] No tobacco products within 24 hours of surgery     [x] No alcohol or illegal drug use, marijuana included, within 24 hours of

## 2025-07-20 ENCOUNTER — ANESTHESIA EVENT (OUTPATIENT)
Dept: OPERATING ROOM | Age: 40
End: 2025-07-20
Payer: COMMERCIAL

## 2025-07-21 ENCOUNTER — ANESTHESIA (OUTPATIENT)
Dept: OPERATING ROOM | Age: 40
End: 2025-07-21
Payer: COMMERCIAL

## 2025-07-21 ENCOUNTER — HOSPITAL ENCOUNTER (OUTPATIENT)
Age: 40
Setting detail: OUTPATIENT SURGERY
Discharge: HOME | End: 2025-07-21
Attending: UROLOGY | Admitting: UROLOGY
Payer: COMMERCIAL

## 2025-07-21 ENCOUNTER — APPOINTMENT (OUTPATIENT)
Dept: GENERAL RADIOLOGY | Age: 40
End: 2025-07-21
Attending: UROLOGY
Payer: COMMERCIAL

## 2025-07-21 VITALS
DIASTOLIC BLOOD PRESSURE: 78 MMHG | HEIGHT: 69 IN | TEMPERATURE: 97.8 F | BODY MASS INDEX: 32.44 KG/M2 | RESPIRATION RATE: 14 BRPM | WEIGHT: 219 LBS | SYSTOLIC BLOOD PRESSURE: 135 MMHG | HEART RATE: 74 BPM | OXYGEN SATURATION: 95 %

## 2025-07-21 DIAGNOSIS — G89.18 POST-OPERATIVE PAIN: Primary | ICD-10-CM

## 2025-07-21 DIAGNOSIS — N20.1 URETERAL STONE: ICD-10-CM

## 2025-07-21 LAB
HCG, URINE, POC: NEGATIVE
Lab: NORMAL
NEGATIVE QC PASS/FAIL: NORMAL
POSITIVE QC PASS/FAIL: NORMAL

## 2025-07-21 PROCEDURE — 7100000000 HC PACU RECOVERY - FIRST 15 MIN: Performed by: UROLOGY

## 2025-07-21 PROCEDURE — 3700000001 HC ADD 15 MINUTES (ANESTHESIA): Performed by: UROLOGY

## 2025-07-21 PROCEDURE — 82365 CALCULUS SPECTROSCOPY: CPT

## 2025-07-21 PROCEDURE — 6370000000 HC RX 637 (ALT 250 FOR IP): Performed by: UROLOGY

## 2025-07-21 PROCEDURE — 7100000001 HC PACU RECOVERY - ADDTL 15 MIN: Performed by: UROLOGY

## 2025-07-21 PROCEDURE — 6360000002 HC RX W HCPCS: Performed by: ANESTHESIOLOGY

## 2025-07-21 PROCEDURE — 7100000011 HC PHASE II RECOVERY - ADDTL 15 MIN: Performed by: UROLOGY

## 2025-07-21 PROCEDURE — C2617 STENT, NON-COR, TEM W/O DEL: HCPCS | Performed by: UROLOGY

## 2025-07-21 PROCEDURE — 3700000000 HC ANESTHESIA ATTENDED CARE: Performed by: UROLOGY

## 2025-07-21 PROCEDURE — C1758 CATHETER, URETERAL: HCPCS | Performed by: UROLOGY

## 2025-07-21 PROCEDURE — C1894 INTRO/SHEATH, NON-LASER: HCPCS | Performed by: UROLOGY

## 2025-07-21 PROCEDURE — 2720000010 HC SURG SUPPLY STERILE: Performed by: UROLOGY

## 2025-07-21 PROCEDURE — 2709999900 HC NON-CHARGEABLE SUPPLY: Performed by: UROLOGY

## 2025-07-21 PROCEDURE — 74420 UROGRAPHY RTRGR +-KUB: CPT

## 2025-07-21 PROCEDURE — 6360000002 HC RX W HCPCS: Performed by: NURSE ANESTHETIST, CERTIFIED REGISTERED

## 2025-07-21 PROCEDURE — 2580000003 HC RX 258: Performed by: NURSE PRACTITIONER

## 2025-07-21 PROCEDURE — 3600000013 HC SURGERY LEVEL 3 ADDTL 15MIN: Performed by: UROLOGY

## 2025-07-21 PROCEDURE — 6360000002 HC RX W HCPCS

## 2025-07-21 PROCEDURE — 6360000002 HC RX W HCPCS: Performed by: NURSE PRACTITIONER

## 2025-07-21 PROCEDURE — 6360000004 HC RX CONTRAST MEDICATION: Performed by: UROLOGY

## 2025-07-21 PROCEDURE — 3600000003 HC SURGERY LEVEL 3 BASE: Performed by: UROLOGY

## 2025-07-21 PROCEDURE — 88300 SURGICAL PATH GROSS: CPT

## 2025-07-21 PROCEDURE — 7100000010 HC PHASE II RECOVERY - FIRST 15 MIN: Performed by: UROLOGY

## 2025-07-21 PROCEDURE — C1769 GUIDE WIRE: HCPCS | Performed by: UROLOGY

## 2025-07-21 DEVICE — URETERAL STENT
Type: IMPLANTABLE DEVICE | Site: URETER | Status: FUNCTIONAL
Brand: PERCUFLEX™

## 2025-07-21 RX ORDER — PHENAZOPYRIDINE HYDROCHLORIDE 100 MG/1
200 TABLET, FILM COATED ORAL ONCE
Status: COMPLETED | OUTPATIENT
Start: 2025-07-21 | End: 2025-07-21

## 2025-07-21 RX ORDER — ONDANSETRON 2 MG/ML
INJECTION INTRAMUSCULAR; INTRAVENOUS
Status: DISCONTINUED | OUTPATIENT
Start: 2025-07-21 | End: 2025-07-21 | Stop reason: SDUPTHER

## 2025-07-21 RX ORDER — OXYCODONE HYDROCHLORIDE 5 MG/1
5 TABLET ORAL EVERY 6 HOURS PRN
Qty: 5 TABLET | Refills: 0 | Status: SHIPPED | OUTPATIENT
Start: 2025-07-21 | End: 2025-07-24

## 2025-07-21 RX ORDER — FENTANYL CITRATE 50 UG/ML
INJECTION, SOLUTION INTRAMUSCULAR; INTRAVENOUS
Status: COMPLETED
Start: 2025-07-21 | End: 2025-07-21

## 2025-07-21 RX ORDER — LIDOCAINE HYDROCHLORIDE 20 MG/ML
JELLY TOPICAL PRN
Status: DISCONTINUED | OUTPATIENT
Start: 2025-07-21 | End: 2025-07-21 | Stop reason: ALTCHOICE

## 2025-07-21 RX ORDER — PHENAZOPYRIDINE HYDROCHLORIDE 100 MG/1
100 TABLET, FILM COATED ORAL 3 TIMES DAILY PRN
Qty: 21 TABLET | Refills: 0 | Status: SHIPPED | OUTPATIENT
Start: 2025-07-21 | End: 2025-07-28

## 2025-07-21 RX ORDER — LEVOFLOXACIN 5 MG/ML
500 INJECTION, SOLUTION INTRAVENOUS
Status: COMPLETED | OUTPATIENT
Start: 2025-07-21 | End: 2025-07-21

## 2025-07-21 RX ORDER — PROPOFOL 10 MG/ML
INJECTION, EMULSION INTRAVENOUS
Status: DISCONTINUED | OUTPATIENT
Start: 2025-07-21 | End: 2025-07-21 | Stop reason: SDUPTHER

## 2025-07-21 RX ORDER — FENTANYL CITRATE 50 UG/ML
INJECTION, SOLUTION INTRAMUSCULAR; INTRAVENOUS
Status: DISCONTINUED | OUTPATIENT
Start: 2025-07-21 | End: 2025-07-21 | Stop reason: SDUPTHER

## 2025-07-21 RX ORDER — MIDAZOLAM HYDROCHLORIDE 1 MG/ML
INJECTION, SOLUTION INTRAMUSCULAR; INTRAVENOUS
Status: DISCONTINUED | OUTPATIENT
Start: 2025-07-21 | End: 2025-07-21 | Stop reason: SDUPTHER

## 2025-07-21 RX ORDER — SODIUM CHLORIDE 0.9 % (FLUSH) 0.9 %
5-40 SYRINGE (ML) INJECTION PRN
Status: DISCONTINUED | OUTPATIENT
Start: 2025-07-21 | End: 2025-07-21 | Stop reason: HOSPADM

## 2025-07-21 RX ORDER — DIPHENHYDRAMINE HYDROCHLORIDE 50 MG/ML
INJECTION, SOLUTION INTRAMUSCULAR; INTRAVENOUS
Status: DISCONTINUED | OUTPATIENT
Start: 2025-07-21 | End: 2025-07-21 | Stop reason: SDUPTHER

## 2025-07-21 RX ORDER — IOPAMIDOL 612 MG/ML
INJECTION, SOLUTION INTRAVASCULAR PRN
Status: DISCONTINUED | OUTPATIENT
Start: 2025-07-21 | End: 2025-07-21 | Stop reason: ALTCHOICE

## 2025-07-21 RX ORDER — SODIUM CHLORIDE 0.9 % (FLUSH) 0.9 %
5-40 SYRINGE (ML) INJECTION EVERY 12 HOURS SCHEDULED
Status: DISCONTINUED | OUTPATIENT
Start: 2025-07-21 | End: 2025-07-21 | Stop reason: HOSPADM

## 2025-07-21 RX ORDER — SODIUM CHLORIDE 9 MG/ML
INJECTION, SOLUTION INTRAVENOUS CONTINUOUS
Status: DISCONTINUED | OUTPATIENT
Start: 2025-07-21 | End: 2025-07-21 | Stop reason: HOSPADM

## 2025-07-21 RX ORDER — OXYCODONE HYDROCHLORIDE 5 MG/1
10 TABLET ORAL PRN
Status: DISCONTINUED | OUTPATIENT
Start: 2025-07-21 | End: 2025-07-21 | Stop reason: HOSPADM

## 2025-07-21 RX ORDER — FENTANYL CITRATE 50 UG/ML
50 INJECTION, SOLUTION INTRAMUSCULAR; INTRAVENOUS EVERY 5 MIN PRN
Status: DISCONTINUED | OUTPATIENT
Start: 2025-07-21 | End: 2025-07-21 | Stop reason: HOSPADM

## 2025-07-21 RX ORDER — OXYCODONE HYDROCHLORIDE 5 MG/1
5 TABLET ORAL PRN
Status: DISCONTINUED | OUTPATIENT
Start: 2025-07-21 | End: 2025-07-21 | Stop reason: HOSPADM

## 2025-07-21 RX ORDER — SODIUM CHLORIDE 9 MG/ML
INJECTION, SOLUTION INTRAVENOUS PRN
Status: DISCONTINUED | OUTPATIENT
Start: 2025-07-21 | End: 2025-07-21 | Stop reason: HOSPADM

## 2025-07-21 RX ADMIN — HYDROMORPHONE HYDROCHLORIDE 0.5 MG: 1 INJECTION, SOLUTION INTRAMUSCULAR; INTRAVENOUS; SUBCUTANEOUS at 11:11

## 2025-07-21 RX ADMIN — LEVOFLOXACIN 500 MG: 500 INJECTION, SOLUTION INTRAVENOUS at 08:39

## 2025-07-21 RX ADMIN — ONDANSETRON 4 MG: 2 INJECTION INTRAMUSCULAR; INTRAVENOUS at 09:35

## 2025-07-21 RX ADMIN — PROPOFOL 50 MG: 10 INJECTION, EMULSION INTRAVENOUS at 09:39

## 2025-07-21 RX ADMIN — FENTANYL CITRATE 50 MCG: 0.05 INJECTION, SOLUTION INTRAMUSCULAR; INTRAVENOUS at 10:49

## 2025-07-21 RX ADMIN — FENTANYL CITRATE 50 MCG: 50 INJECTION, SOLUTION INTRAMUSCULAR; INTRAVENOUS at 09:35

## 2025-07-21 RX ADMIN — PHENAZOPYRIDINE 200 MG: 100 TABLET ORAL at 11:11

## 2025-07-21 RX ADMIN — SODIUM CHLORIDE: 9 INJECTION, SOLUTION INTRAVENOUS at 08:10

## 2025-07-21 RX ADMIN — PROPOFOL 150 MCG/KG/MIN: 10 INJECTION, EMULSION INTRAVENOUS at 09:36

## 2025-07-21 RX ADMIN — Medication 0.5 MG: at 11:11

## 2025-07-21 RX ADMIN — PROPOFOL 50 MG: 10 INJECTION, EMULSION INTRAVENOUS at 09:45

## 2025-07-21 RX ADMIN — MIDAZOLAM 2 MG: 1 INJECTION INTRAMUSCULAR; INTRAVENOUS at 09:29

## 2025-07-21 RX ADMIN — DIPHENHYDRAMINE HYDROCHLORIDE 25 MG: 50 INJECTION, SOLUTION INTRAMUSCULAR; INTRAVENOUS at 09:39

## 2025-07-21 RX ADMIN — FENTANYL CITRATE 50 MCG: 50 INJECTION, SOLUTION INTRAMUSCULAR; INTRAVENOUS at 09:37

## 2025-07-21 RX ADMIN — FENTANYL CITRATE 50 MCG: 0.05 INJECTION, SOLUTION INTRAMUSCULAR; INTRAVENOUS at 10:30

## 2025-07-21 RX ADMIN — PROPOFOL 50 MG: 10 INJECTION, EMULSION INTRAVENOUS at 09:35

## 2025-07-21 ASSESSMENT — PAIN SCALES - GENERAL
PAINLEVEL_OUTOF10: 10
PAINLEVEL_OUTOF10: 9
PAINLEVEL_OUTOF10: 4

## 2025-07-21 ASSESSMENT — PAIN DESCRIPTION - DESCRIPTORS
DESCRIPTORS: ACHING;DISCOMFORT;DULL
DESCRIPTORS: PRESSURE;CRAMPING
DESCRIPTORS: CRAMPING
DESCRIPTORS: PRESSURE;CRAMPING

## 2025-07-21 ASSESSMENT — PAIN DESCRIPTION - PAIN TYPE
TYPE: SURGICAL PAIN

## 2025-07-21 ASSESSMENT — PAIN DESCRIPTION - ONSET
ONSET: GRADUAL
ONSET: ON-GOING

## 2025-07-21 ASSESSMENT — PAIN DESCRIPTION - ORIENTATION
ORIENTATION: MID

## 2025-07-21 ASSESSMENT — PAIN DESCRIPTION - FREQUENCY
FREQUENCY: CONTINUOUS
FREQUENCY: INTERMITTENT
FREQUENCY: CONTINUOUS

## 2025-07-21 ASSESSMENT — PAIN DESCRIPTION - LOCATION
LOCATION: PELVIS

## 2025-07-21 ASSESSMENT — PAIN - FUNCTIONAL ASSESSMENT: PAIN_FUNCTIONAL_ASSESSMENT: 0-10

## 2025-07-21 ASSESSMENT — LIFESTYLE VARIABLES: SMOKING_STATUS: 1

## 2025-07-21 NOTE — PROGRESS NOTES
Attempted to verify chart, saying horacio score not complete even though it is...unable to verify chart

## 2025-07-21 NOTE — ANESTHESIA POSTPROCEDURE EVALUATION
Department of Anesthesiology  Postprocedure Note    Patient: Tawana Limon  MRN: [id number]  YOB: 1985  Date of evaluation: 7/21/2025    Procedure Summary       Date: 07/21/25 Room / Location: [address]    Anesthesia Start: 0926 Anesthesia Stop:     Procedure: CYSTOSCOPY RETROGRADE PYELOGRAM LEFT URETEROSCOPY WITH STONE BASKET EXTRACTION, LEFT STENT EXCHANGE (Left: Ureter) Diagnosis:       Ureteral stone      (Ureteral stone [N20.1])    Surgeons: Matteo Ceballos MD Responsible Provider: Autumn Finley DO    Anesthesia Type: MAC ASA Status: 3            Anesthesia Type: No value filed.    Oneil Phase I: Oneil Score: 10    Oneil Phase II:      Anesthesia Post Evaluation    Patient location during evaluation: PACU  Patient participation: complete - patient participated  Level of consciousness: awake  Airway patency: patent  Nausea & Vomiting: no nausea and no vomiting  Cardiovascular status: hemodynamically stable  Respiratory status: acceptable  Hydration status: euvolemic  Pain management: adequate        No notable events documented.

## 2025-07-21 NOTE — ANESTHESIA PRE PROCEDURE
history of anesthetic complications:   Airway: Mallampati: IV  TM distance: >3 FB   Neck ROM: full  Mouth opening: > = 3 FB   Dental:          Pulmonary:   (+)           current smoker          Patient smoked on day of surgery.                 Cardiovascular:Negative CV ROS  Exercise tolerance: good (>4 METS)           Beta Blocker:  Not on Beta Blocker         Neuro/Psych:   (+) psychiatric history:             ROS comment: Hx PPD GI/Hepatic/Renal:   (+) renal disease: kidney stones          Endo/Other:              Pt had no PAT visit       Abdominal:             Vascular: negative vascular ROS.         Other Findings:             Anesthesia Plan      MAC     ASA 3       Induction: intravenous.    MIPS: Postoperative opioids intended and Prophylactic antiemetics administered.  Anesthetic plan and risks discussed with patient.      Plan discussed with CRNA.    Attending anesthesiologist reviewed and agrees with Preprocedure content            KIT PIERRE DO   7/21/2025

## 2025-07-21 NOTE — DISCHARGE INSTRUCTIONS
[facility]  [address]  [place] [address]  [phone]  FAX [phone]                   INSTRUCTIONS FOR AFTER SURGERY DR. SAMPSON MUÑIZ    General:   -You will be groggy throughout the day due to the effects of anesthesia.  Have a responsible adult monitor you for the next 24 hours.  -Call if fever or chills  -It is very normal to have burning and pain.  If the pain is severe and out of your control contact Dr. Muñiz.    Diet: You may eat or drink whatever you like today.  You may experience some nausea.  Call if you have vomiting or inability to tolerate food or drink.    Urine: Expect blood in your urine.  This is normal.  This may be very traumatic in appearance but is not concerning unless there are large amount of clots that prevent you from being able to urinate.  If you have any questions or concerns contact Dr. Muñiz's office.    Driving: You may not drive for 24 hours.  You also may not drive if you are taking narcotic pain medications.    Activity: There are no restrictions on your activity.

## 2025-07-21 NOTE — H&P
Tawana Limon is a 40 y.o. female with left ureteral stones    Past Medical History:   Diagnosis Date    28 weeks gestation of pregnancy 2020    35 weeks gestation of pregnancy 2020    38 weeks gestation of pregnancy 2020    39 weeks gestation of pregnancy 2020    Abnormal Pap smear of cervix     Chronic kidney disease     Kidney stone     Knee effusion     Mental disorder     Postpartum depression     Pyelonephritis of right kidney 2020     (spontaneous vaginal delivery) 2020    Urinary tract infection in mother during second trimester of pregnancy 2020       Past Surgical History:   Procedure Laterality Date    CYSTOSCOPY W/ URETERAL STENT PLACEMENT Left 2025    DEBRIDEMENT Left 2025    CYSTOSCOPY RETROGRADE PYELOGRAM LEFT  STENT INSERTION - EXAM UNDER ANESTHESIA [place] [facility] [place]    DILATION AND CURETTAGE OF UTERUS N/A 2019    DILATATION AND CURETTAGE SUCTION performed by Clayton Head MD at [facility] OR       Family History   Problem Relation Age of Onset    Cancer Mother     Breast Cancer Mother     Arthritis Father     Diabetes Father     Heart Attack Father     Heart Disease Father     High Blood Pressure Maternal Grandmother     High Cholesterol Maternal Grandmother     Arthritis Maternal Grandfather     Colon Cancer Maternal Grandfather     Diabetes Maternal Grandfather     Cancer Paternal Grandfather     Diabetes Paternal Grandfather     Heart Attack Paternal Grandfather     Heart Disease Paternal Grandfather     Allergy (Severe) Nephew        Prior to Admission medications    Medication Sig Start Date End Date Taking? Authorizing Provider   oxyBUTYnin (DITROPAN) 5 MG tablet Take 1 tablet by mouth 3 times daily as needed (Bladder spasms/pelvic pain) 25  Yes Mady Arrieta MD   acetaminophen (TYLENOL) 500 MG tablet Take 1 tablet by mouth 4 times daily as needed for Pain 10/6/23  Yes Malissa Colindres,

## 2025-07-21 NOTE — OP NOTE
Operative Note      Patient: Tawana Limon  YOB: 1985  MRN: [id number]    Date of Procedure: 7/21/2025    Pre-Op Diagnosis Codes:      * Ureteral stone [N20.1]    Post-Op Diagnosis: Same       Procedure(s):  CYSTOSCOPY RETROGRADE PYELOGRAM LEFT URETEROSCOPY WITH STONE BASKET EXTRACTION, LEFT STENT EXCHANGE    Surgeon(s):  Matteo Ceballos MD    Assistant:   * No surgical staff found *    Anesthesia: Monitor Anesthesia Care    Estimated Blood Loss (mL): Minimal    Complications: None    Specimens:   ID Type Source Tests Collected by Time Destination   A : LEFT URETERAL STONE FOR CHEMICAL ANALYSIS Stone (Calculus) Stone SURGICAL PATHOLOGY Matteo Ceballos MD 7/21/2025 0951        Implants:  Implant Name Type Inv. Item Serial No.  Lot No. LRB No. Used Action   STENT URET L26CM OD48FR PERCFLX DBL PGTL FLX TIP THRD W O - [id number]  STENT URET L26CM OD48FR PERCFLX DBL PGTL FLX TIP THRD W O  [employer] [facility]- [id number] Left 1 Implanted         Drains:   [REMOVED] Ureteral Drain/Stent 06/27/25 Left Ureter (Removed)         INDICATIONS FOR PROCEDURE: Tawana montes 40 y.o. with a history of nephrolithiasis. The patient has a left ureteral calculus and had a ureteral stent placed by Dr. Saroj Hackett.  The patient presents for ureteroscopy, laser lithotripsy, understands the risks, benefits and alternatives of the procedure, signed informed consent, and agreed to proceed.     DESCRIPTION OF PROCEDURE: The patient was brought into the operating room and placed under anesthesia in the dorsal lithotomy position. The patient was prepped and draped in a sterile fashion. A 21-Indian cystoscope with a 30-degree lens was passed into the bladder.  The entire urethra was examined and found to be within normal limits. 30- degree endoscopy was utilized to inspect the entire bladder mucosal surface. There was no evidence of tumors, calculi, diverticula, or other abnormalities throughout the entire

## 2025-07-24 LAB — SURGICAL PATHOLOGY REPORT: NORMAL

## 2025-07-25 LAB
STONE COMPOSITION: NORMAL
STONE DESCRIPTION: NORMAL
STONE MASS: 7 MG

## (undated) DEVICE — SOLUTION IRRIG 1000ML H2O PIC PLAS SHATTERPROOF CONTAINER

## (undated) DEVICE — GOWN,SIRUS,FABRNF,XL,20/CS: Brand: MEDLINE

## (undated) DEVICE — SYRINGE MED 20ML STD CLR PLAS LUERSLIP TIP N CTRL DISP

## (undated) DEVICE — HOSE CONN FOR WST MGMT SYS NEPTUNE 2

## (undated) DEVICE — GLOVE ORANGE PI 7 1/2   MSG9075

## (undated) DEVICE — CYSTO PACK: Brand: MEDLINE INDUSTRIES, INC.

## (undated) DEVICE — MEDIA CONTRAST ISOVUE 300 100ML

## (undated) DEVICE — FLEXOR, URETERAL ACCESS SHEATH WITH AQ, HYDROPHILIC COATING: Brand: FLEXOR

## (undated) DEVICE — SMARTGOWN BREATHABLE SURGICAL GOWN: Brand: CONVERTORS

## (undated) DEVICE — CYSTO: Brand: MEDLINE INDUSTRIES, INC.

## (undated) DEVICE — CATHETER URET 5FR L70CM OPN END SGL LUMN INJ HUB FLEXIMA

## (undated) DEVICE — SOLUTION IRRIG 1000ML STRL H2O USP PLAS POUR BTL

## (undated) DEVICE — SYRINGE MED 10ML LUERLOCK TIP W/O SFTY DISP

## (undated) DEVICE — SYRINGE MED 30ML STD CLR PLAS LUERLOCK TIP N CTRL DISP

## (undated) DEVICE — SOLUTION IRRG H2O 3000 ML USP STRL TITAN XL CONTAINER

## (undated) DEVICE — SOLUTION SCRB 4OZ 4% CHG H2O AIDED FOR PREOPERATIVE SKIN

## (undated) DEVICE — BAG DRNGE COMB PK

## (undated) DEVICE — DOUBLE BASIN SET: Brand: MEDLINE INDUSTRIES, INC.

## (undated) DEVICE — 1.5 FR, 120 CM, NITI TIPLESS STONE BASKET: Brand: HALO

## (undated) DEVICE — SOL IRR SOD CHL 0.9% TITAN XL CNTNR 3000ML

## (undated) DEVICE — GUIDEWIRE ENDOSCP L150CM DIA0.035IN TIP L15CM BENT PTFE

## (undated) DEVICE — TUBING, SUCTION, 3/16" X 12', STRAIGHT: Brand: MEDLINE

## (undated) DEVICE — 4-PORT MANIFOLD: Brand: NEPTUNE 2

## (undated) DEVICE — SOLUTION IRRIG 3000ML STRL H2O USP UROMATIC PLAS CONT

## (undated) DEVICE — GUIDEWIRE UROLOGICAL STR STD 0.035 IN X150 CM (QTY = BOX OF 5)

## (undated) DEVICE — CATHETER,URETHRAL,VINYL,MALE,16",16 FR: Brand: MEDLINE

## (undated) DEVICE — Z DISCONTINUED USE 2659142 CANNULA VAC STR 10 MM SEMI RIGID RND TIP BERK VACURET DISP

## (undated) DEVICE — PAD MATERNITY CURITY ADH STRIP DISP

## (undated) DEVICE — TRAY PROCED DILATATION CURETTAGE

## (undated) DEVICE — TOWEL,OR,DSP,ST,BLUE,STD,6/PK,12PK/CS: Brand: MEDLINE

## (undated) DEVICE — SYRINGE MED 20ML STD CLR PLAS LUERLOCK TIP N CTRL DISP

## (undated) DEVICE — TRAY,VAG PREP,2PR VNYL GLV,4 C: Brand: MEDLINE INDUSTRIES, INC.

## (undated) DEVICE — GAUZE,SPONGE,4"X4",8PLY,STRL,LF,10/TRAY: Brand: MEDLINE

## (undated) DEVICE — MARKER,SKIN,WI/RULER AND LABELS: Brand: MEDLINE

## (undated) DEVICE — GAUZE,SPONGE,4"X4",16PLY,XRAY,STRL,LF: Brand: MEDLINE

## (undated) DEVICE — GUIDEWIRE ENDOSCP L150CM DIA0.035IN TIP 3CM PTFE NIT

## (undated) DEVICE — GOWN,SIRUS,NONRNF,SETINSLV,XL,20/CS: Brand: MEDLINE

## (undated) DEVICE — COVER,LIGHT HANDLE,FLX,1/PK: Brand: MEDLINE INDUSTRIES, INC.

## (undated) DEVICE — PACK PROC 3IN1 W/ L12FT DIA0.25IN REINF SUCT TBNG W50XL901IN

## (undated) DEVICE — GOWN,SIRUS,FABRNF,2XL,18/CS: Brand: MEDLINE